# Patient Record
Sex: MALE | Race: WHITE | NOT HISPANIC OR LATINO | Employment: OTHER | URBAN - NONMETROPOLITAN AREA
[De-identification: names, ages, dates, MRNs, and addresses within clinical notes are randomized per-mention and may not be internally consistent; named-entity substitution may affect disease eponyms.]

---

## 2017-07-27 ENCOUNTER — OFFICE VISIT (OUTPATIENT)
Dept: UROLOGY | Facility: CLINIC | Age: 82
End: 2017-07-27

## 2017-07-27 VITALS — BODY MASS INDEX: 40.92 KG/M2 | WEIGHT: 270 LBS | HEIGHT: 68 IN | TEMPERATURE: 96.7 F

## 2017-07-27 DIAGNOSIS — R35.1 NOCTURIA: Primary | ICD-10-CM

## 2017-07-27 LAB
BILIRUB BLD-MCNC: NEGATIVE MG/DL
CLARITY, POC: CLEAR
COLOR UR: YELLOW
GLUCOSE UR STRIP-MCNC: NEGATIVE MG/DL
KETONES UR QL: NEGATIVE
LEUKOCYTE EST, POC: NEGATIVE
NITRITE UR-MCNC: NEGATIVE MG/ML
PH UR: 5.5 [PH] (ref 5–8)
PROT UR STRIP-MCNC: NEGATIVE MG/DL
RBC # UR STRIP: NEGATIVE /UL
SP GR UR: 1.01 (ref 1–1.03)
UROBILINOGEN UR QL: NORMAL

## 2017-07-27 PROCEDURE — 99213 OFFICE O/P EST LOW 20 MIN: CPT | Performed by: UROLOGY

## 2017-07-27 PROCEDURE — 81003 URINALYSIS AUTO W/O SCOPE: CPT | Performed by: UROLOGY

## 2017-07-27 RX ORDER — DILTIAZEM HYDROCHLORIDE 180 MG/1
CAPSULE, COATED, EXTENDED RELEASE ORAL
Refills: 5 | COMMUNITY
Start: 2017-06-27

## 2017-07-27 RX ORDER — LOSARTAN POTASSIUM 50 MG/1
TABLET ORAL
Refills: 6 | COMMUNITY
Start: 2017-06-12

## 2017-07-27 RX ORDER — CETIRIZINE HYDROCHLORIDE 10 MG/1
TABLET ORAL
Refills: 5 | COMMUNITY
Start: 2017-06-26

## 2017-07-27 RX ORDER — ALBUTEROL SULFATE 2.5 MG/3ML
SOLUTION RESPIRATORY (INHALATION)
Refills: 1 | COMMUNITY
Start: 2017-06-06

## 2017-07-27 RX ORDER — CLOPIDOGREL BISULFATE 75 MG/1
TABLET ORAL
Refills: 5 | COMMUNITY
Start: 2017-06-12

## 2017-07-27 RX ORDER — ESCITALOPRAM OXALATE 10 MG/1
TABLET ORAL
Refills: 5 | COMMUNITY
Start: 2017-06-12

## 2017-07-27 RX ORDER — TAMSULOSIN HYDROCHLORIDE 0.4 MG/1
1 CAPSULE ORAL NIGHTLY
Qty: 30 CAPSULE | Refills: 11 | Status: SHIPPED | OUTPATIENT
Start: 2017-07-27 | End: 2017-08-26

## 2017-07-27 RX ORDER — FAMOTIDINE 20 MG/1
TABLET, FILM COATED ORAL
Refills: 6 | COMMUNITY
Start: 2017-06-12

## 2017-07-27 RX ORDER — PRAVASTATIN SODIUM 20 MG
TABLET ORAL
Refills: 5 | COMMUNITY
Start: 2017-06-12

## 2017-07-27 RX ORDER — ERYTHROMYCIN 5 MG/G
OINTMENT OPHTHALMIC
Refills: 5 | COMMUNITY
Start: 2017-07-07

## 2019-02-11 ENCOUNTER — HOSPITAL ENCOUNTER (OUTPATIENT)
Age: 84
Setting detail: OBSERVATION
Discharge: HOME HEALTH | End: 2019-02-14
Attending: EMERGENCY MEDICINE | Admitting: FAMILY MEDICINE
Payer: MEDICARE

## 2019-02-11 DIAGNOSIS — R11.2 NAUSEA VOMITING AND DIARRHEA: Primary | ICD-10-CM

## 2019-02-11 DIAGNOSIS — R19.7 NAUSEA VOMITING AND DIARRHEA: Primary | ICD-10-CM

## 2019-02-11 LAB
ALBUMIN SERPL-MCNC: 4.3 G/DL (ref 3.5–5.2)
ALP BLD-CCNC: 128 U/L (ref 40–130)
ALT SERPL-CCNC: 13 U/L (ref 5–41)
ANION GAP SERPL CALCULATED.3IONS-SCNC: 14 MMOL/L (ref 7–19)
AST SERPL-CCNC: 20 U/L (ref 5–40)
BASOPHILS ABSOLUTE: 0 K/UL (ref 0–0.2)
BASOPHILS RELATIVE PERCENT: 0.2 % (ref 0–1)
BILIRUB SERPL-MCNC: 1 MG/DL (ref 0.2–1.2)
BILIRUBIN URINE: NEGATIVE
BLOOD, URINE: NEGATIVE
BUN BLDV-MCNC: 25 MG/DL (ref 8–23)
CALCIUM SERPL-MCNC: 9.2 MG/DL (ref 8.2–9.6)
CHLORIDE BLD-SCNC: 103 MMOL/L (ref 98–111)
CLARITY: ABNORMAL
CO2: 19 MMOL/L (ref 22–29)
COLOR: ABNORMAL
CREAT SERPL-MCNC: 1.3 MG/DL (ref 0.5–1.2)
EOSINOPHILS ABSOLUTE: 0 K/UL (ref 0–0.6)
EOSINOPHILS RELATIVE PERCENT: 0 % (ref 0–5)
GFR NON-AFRICAN AMERICAN: 51
GLUCOSE BLD-MCNC: 143 MG/DL (ref 74–109)
GLUCOSE URINE: NEGATIVE MG/DL
HCT VFR BLD CALC: 45.9 % (ref 42–52)
HEMOGLOBIN: 15.3 G/DL (ref 14–18)
KETONES, URINE: NEGATIVE MG/DL
LEUKOCYTE ESTERASE, URINE: NEGATIVE
LIPASE: 34 U/L (ref 13–60)
LYMPHOCYTES ABSOLUTE: 0.5 K/UL (ref 1.1–4.5)
LYMPHOCYTES RELATIVE PERCENT: 3.7 % (ref 20–40)
MCH RBC QN AUTO: 31.5 PG (ref 27–31)
MCHC RBC AUTO-ENTMCNC: 33.3 G/DL (ref 33–37)
MCV RBC AUTO: 94.4 FL (ref 80–94)
MONOCYTES ABSOLUTE: 0.5 K/UL (ref 0–0.9)
MONOCYTES RELATIVE PERCENT: 4.5 % (ref 0–10)
NEUTROPHILS ABSOLUTE: 11 K/UL (ref 1.5–7.5)
NEUTROPHILS RELATIVE PERCENT: 91.2 % (ref 50–65)
NITRITE, URINE: NEGATIVE
PDW BLD-RTO: 14.6 % (ref 11.5–14.5)
PH UA: 5.5
PLATELET # BLD: 192 K/UL (ref 130–400)
PMV BLD AUTO: 9.9 FL (ref 9.4–12.4)
POTASSIUM SERPL-SCNC: 5.5 MMOL/L (ref 3.5–5)
PROTEIN UA: ABNORMAL MG/DL
RBC # BLD: 4.86 M/UL (ref 4.7–6.1)
SODIUM BLD-SCNC: 136 MMOL/L (ref 136–145)
SPECIFIC GRAVITY UA: 1.02
TOTAL PROTEIN: 6.5 G/DL (ref 6.6–8.7)
URINE REFLEX TO CULTURE: ABNORMAL
UROBILINOGEN, URINE: 0.2 E.U./DL
WBC # BLD: 12.1 K/UL (ref 4.8–10.8)

## 2019-02-11 PROCEDURE — 99285 EMERGENCY DEPT VISIT HI MDM: CPT

## 2019-02-11 PROCEDURE — 96374 THER/PROPH/DIAG INJ IV PUSH: CPT

## 2019-02-11 PROCEDURE — 80053 COMPREHEN METABOLIC PANEL: CPT

## 2019-02-11 PROCEDURE — 2580000003 HC RX 258: Performed by: EMERGENCY MEDICINE

## 2019-02-11 PROCEDURE — 93005 ELECTROCARDIOGRAM TRACING: CPT

## 2019-02-11 PROCEDURE — 36415 COLL VENOUS BLD VENIPUNCTURE: CPT

## 2019-02-11 PROCEDURE — 83690 ASSAY OF LIPASE: CPT

## 2019-02-11 PROCEDURE — 85025 COMPLETE CBC W/AUTO DIFF WBC: CPT

## 2019-02-11 PROCEDURE — 6360000002 HC RX W HCPCS

## 2019-02-11 RX ORDER — ONDANSETRON 2 MG/ML
INJECTION INTRAMUSCULAR; INTRAVENOUS
Status: COMPLETED
Start: 2019-02-11 | End: 2019-02-11

## 2019-02-11 RX ORDER — 0.9 % SODIUM CHLORIDE 0.9 %
1000 INTRAVENOUS SOLUTION INTRAVENOUS ONCE
Status: COMPLETED | OUTPATIENT
Start: 2019-02-11 | End: 2019-02-11

## 2019-02-11 RX ADMIN — SODIUM CHLORIDE 1000 ML: 9 INJECTION, SOLUTION INTRAVENOUS at 20:09

## 2019-02-11 RX ADMIN — ONDANSETRON 4 MG: 2 INJECTION INTRAMUSCULAR; INTRAVENOUS at 23:29

## 2019-02-11 ASSESSMENT — ENCOUNTER SYMPTOMS
NAUSEA: 1
EYE REDNESS: 0
COLOR CHANGE: 0
CHEST TIGHTNESS: 0
CONSTIPATION: 0
SORE THROAT: 0
DIARRHEA: 1
WHEEZING: 0
BACK PAIN: 0
ABDOMINAL PAIN: 0
VOMITING: 1
SINUS PRESSURE: 0
ABDOMINAL DISTENTION: 0
PHOTOPHOBIA: 0
COUGH: 0
TROUBLE SWALLOWING: 0
SHORTNESS OF BREATH: 0
EYE PAIN: 0

## 2019-02-12 PROBLEM — E86.0 DEHYDRATION: Status: ACTIVE | Noted: 2019-02-12

## 2019-02-12 PROBLEM — N17.9 AKI (ACUTE KIDNEY INJURY) (HCC): Status: ACTIVE | Noted: 2019-02-12

## 2019-02-12 PROBLEM — E87.5 HYPERKALEMIA: Status: ACTIVE | Noted: 2019-02-12

## 2019-02-12 PROBLEM — K52.9 AGE (ACUTE GASTROENTERITIS): Status: ACTIVE | Noted: 2019-02-12

## 2019-02-12 PROBLEM — R11.2 INTRACTABLE VOMITING WITH NAUSEA: Status: ACTIVE | Noted: 2019-02-12

## 2019-02-12 PROBLEM — I10 ESSENTIAL HYPERTENSION: Status: ACTIVE | Noted: 2019-02-12

## 2019-02-12 PROBLEM — K21.9 GERD (GASTROESOPHAGEAL REFLUX DISEASE): Status: ACTIVE | Noted: 2019-02-12

## 2019-02-12 PROCEDURE — 2580000003 HC RX 258: Performed by: EMERGENCY MEDICINE

## 2019-02-12 PROCEDURE — 96372 THER/PROPH/DIAG INJ SC/IM: CPT

## 2019-02-12 PROCEDURE — G0378 HOSPITAL OBSERVATION PER HR: HCPCS

## 2019-02-12 PROCEDURE — 6360000002 HC RX W HCPCS: Performed by: FAMILY MEDICINE

## 2019-02-12 PROCEDURE — 81003 URINALYSIS AUTO W/O SCOPE: CPT

## 2019-02-12 PROCEDURE — 99285 EMERGENCY DEPT VISIT HI MDM: CPT | Performed by: EMERGENCY MEDICINE

## 2019-02-12 PROCEDURE — 2580000003 HC RX 258: Performed by: FAMILY MEDICINE

## 2019-02-12 PROCEDURE — 6370000000 HC RX 637 (ALT 250 FOR IP): Performed by: FAMILY MEDICINE

## 2019-02-12 RX ORDER — ESCITALOPRAM OXALATE 10 MG/1
10 TABLET ORAL NIGHTLY
COMMUNITY
End: 2020-07-20

## 2019-02-12 RX ORDER — ONDANSETRON 2 MG/ML
4 INJECTION INTRAMUSCULAR; INTRAVENOUS EVERY 8 HOURS PRN
Status: DISCONTINUED | OUTPATIENT
Start: 2019-02-12 | End: 2019-02-12

## 2019-02-12 RX ORDER — DILTIAZEM HYDROCHLORIDE 180 MG/1
180 CAPSULE, COATED, EXTENDED RELEASE ORAL DAILY
Status: DISCONTINUED | OUTPATIENT
Start: 2019-02-12 | End: 2019-02-14 | Stop reason: HOSPADM

## 2019-02-12 RX ORDER — CLOPIDOGREL BISULFATE 75 MG/1
75 TABLET ORAL DAILY
Status: DISCONTINUED | OUTPATIENT
Start: 2019-02-12 | End: 2019-02-14 | Stop reason: HOSPADM

## 2019-02-12 RX ORDER — CLOPIDOGREL BISULFATE 75 MG/1
75 TABLET ORAL DAILY
COMMUNITY

## 2019-02-12 RX ORDER — SODIUM CHLORIDE 9 MG/ML
INJECTION, SOLUTION INTRAVENOUS CONTINUOUS
Status: DISCONTINUED | OUTPATIENT
Start: 2019-02-12 | End: 2019-02-14 | Stop reason: HOSPADM

## 2019-02-12 RX ORDER — ESCITALOPRAM OXALATE 10 MG/1
10 TABLET ORAL NIGHTLY
Status: DISCONTINUED | OUTPATIENT
Start: 2019-02-12 | End: 2019-02-14 | Stop reason: HOSPADM

## 2019-02-12 RX ORDER — PRAVASTATIN SODIUM 10 MG
10 TABLET ORAL NIGHTLY
Status: DISCONTINUED | OUTPATIENT
Start: 2019-02-12 | End: 2019-02-14 | Stop reason: HOSPADM

## 2019-02-12 RX ORDER — ACETAMINOPHEN 325 MG/1
650 TABLET ORAL EVERY 4 HOURS PRN
Status: DISCONTINUED | OUTPATIENT
Start: 2019-02-12 | End: 2019-02-14 | Stop reason: HOSPADM

## 2019-02-12 RX ORDER — ONDANSETRON 2 MG/ML
4 INJECTION INTRAMUSCULAR; INTRAVENOUS EVERY 6 HOURS PRN
Status: DISCONTINUED | OUTPATIENT
Start: 2019-02-12 | End: 2019-02-14 | Stop reason: HOSPADM

## 2019-02-12 RX ORDER — SODIUM CHLORIDE 0.9 % (FLUSH) 0.9 %
10 SYRINGE (ML) INJECTION PRN
Status: DISCONTINUED | OUTPATIENT
Start: 2019-02-12 | End: 2019-02-12

## 2019-02-12 RX ORDER — ASPIRIN 325 MG
325 TABLET ORAL NIGHTLY
Status: DISCONTINUED | OUTPATIENT
Start: 2019-02-12 | End: 2019-02-14 | Stop reason: HOSPADM

## 2019-02-12 RX ORDER — 0.9 % SODIUM CHLORIDE 0.9 %
1000 INTRAVENOUS SOLUTION INTRAVENOUS ONCE
Status: COMPLETED | OUTPATIENT
Start: 2019-02-12 | End: 2019-02-12

## 2019-02-12 RX ORDER — ONDANSETRON 2 MG/ML
4 INJECTION INTRAMUSCULAR; INTRAVENOUS ONCE
Status: COMPLETED | OUTPATIENT
Start: 2019-02-12 | End: 2019-02-11

## 2019-02-12 RX ORDER — ASPIRIN 325 MG
325 TABLET ORAL NIGHTLY
Status: ON HOLD | COMMUNITY
End: 2019-02-14 | Stop reason: HOSPADM

## 2019-02-12 RX ORDER — ACETAMINOPHEN,DIPHENHYDRAMINE HCL 500; 25 MG/1; MG/1
1 TABLET, FILM COATED ORAL NIGHTLY PRN
Status: ON HOLD | COMMUNITY
End: 2020-03-11 | Stop reason: HOSPADM

## 2019-02-12 RX ORDER — SODIUM CHLORIDE 0.9 % (FLUSH) 0.9 %
10 SYRINGE (ML) INJECTION EVERY 12 HOURS SCHEDULED
Status: DISCONTINUED | OUTPATIENT
Start: 2019-02-12 | End: 2019-02-14

## 2019-02-12 RX ORDER — LANOLIN ALCOHOL/MO/W.PET/CERES
1000 CREAM (GRAM) TOPICAL DAILY
COMMUNITY

## 2019-02-12 RX ORDER — ACETAMINOPHEN 160 MG
2000 TABLET,DISINTEGRATING ORAL DAILY
COMMUNITY

## 2019-02-12 RX ORDER — SODIUM CHLORIDE 0.9 % (FLUSH) 0.9 %
10 SYRINGE (ML) INJECTION PRN
Status: DISCONTINUED | OUTPATIENT
Start: 2019-02-12 | End: 2019-02-14 | Stop reason: HOSPADM

## 2019-02-12 RX ORDER — LOSARTAN POTASSIUM 50 MG/1
50 TABLET ORAL DAILY
Status: DISCONTINUED | OUTPATIENT
Start: 2019-02-12 | End: 2019-02-14 | Stop reason: HOSPADM

## 2019-02-12 RX ORDER — LOSARTAN POTASSIUM 50 MG/1
50 TABLET ORAL DAILY
Status: ON HOLD | COMMUNITY
End: 2020-03-11 | Stop reason: HOSPADM

## 2019-02-12 RX ORDER — CETIRIZINE HYDROCHLORIDE 10 MG/1
10 TABLET ORAL DAILY
Status: ON HOLD | COMMUNITY
End: 2020-03-11 | Stop reason: HOSPADM

## 2019-02-12 RX ORDER — SODIUM CHLORIDE 0.9 % (FLUSH) 0.9 %
10 SYRINGE (ML) INJECTION EVERY 12 HOURS SCHEDULED
Status: DISCONTINUED | OUTPATIENT
Start: 2019-02-12 | End: 2019-02-12

## 2019-02-12 RX ADMIN — DILTIAZEM HYDROCHLORIDE 180 MG: 180 CAPSULE, COATED, EXTENDED RELEASE ORAL at 11:13

## 2019-02-12 RX ADMIN — CLOPIDOGREL BISULFATE 75 MG: 75 TABLET, FILM COATED ORAL at 11:12

## 2019-02-12 RX ADMIN — SODIUM CHLORIDE 1000 ML: 9 INJECTION, SOLUTION INTRAVENOUS at 00:40

## 2019-02-12 RX ADMIN — ESCITALOPRAM OXALATE 10 MG: 10 TABLET ORAL at 20:32

## 2019-02-12 RX ADMIN — PRAVASTATIN SODIUM 10 MG: 10 TABLET ORAL at 20:32

## 2019-02-12 RX ADMIN — SODIUM CHLORIDE: 9 INJECTION, SOLUTION INTRAVENOUS at 11:27

## 2019-02-12 RX ADMIN — Medication 10 ML: at 11:17

## 2019-02-12 RX ADMIN — LOSARTAN POTASSIUM 50 MG: 50 TABLET ORAL at 11:13

## 2019-02-12 RX ADMIN — ENOXAPARIN SODIUM 40 MG: 40 INJECTION SUBCUTANEOUS at 11:12

## 2019-02-12 RX ADMIN — ASPIRIN 325 MG ORAL TABLET 325 MG: 325 PILL ORAL at 20:32

## 2019-02-12 ASSESSMENT — PAIN SCALES - GENERAL: PAINLEVEL_OUTOF10: 0

## 2019-02-13 LAB
ANION GAP SERPL CALCULATED.3IONS-SCNC: 13 MMOL/L (ref 7–19)
BUN BLDV-MCNC: 21 MG/DL (ref 8–23)
CALCIUM SERPL-MCNC: 8.8 MG/DL (ref 8.2–9.6)
CHLORIDE BLD-SCNC: 102 MMOL/L (ref 98–111)
CO2: 22 MMOL/L (ref 22–29)
CREAT SERPL-MCNC: 1.2 MG/DL (ref 0.5–1.2)
GFR NON-AFRICAN AMERICAN: 56
GLUCOSE BLD-MCNC: 100 MG/DL (ref 74–109)
HCT VFR BLD CALC: 39.5 % (ref 42–52)
HEMOGLOBIN: 13 G/DL (ref 14–18)
MCH RBC QN AUTO: 31.6 PG (ref 27–31)
MCHC RBC AUTO-ENTMCNC: 32.9 G/DL (ref 33–37)
MCV RBC AUTO: 96.1 FL (ref 80–94)
PDW BLD-RTO: 14.9 % (ref 11.5–14.5)
PLATELET # BLD: 135 K/UL (ref 130–400)
PMV BLD AUTO: 9.6 FL (ref 9.4–12.4)
POTASSIUM SERPL-SCNC: 4.3 MMOL/L (ref 3.5–5)
RBC # BLD: 4.11 M/UL (ref 4.7–6.1)
SODIUM BLD-SCNC: 137 MMOL/L (ref 136–145)
WBC # BLD: 8.1 K/UL (ref 4.8–10.8)

## 2019-02-13 PROCEDURE — 6360000002 HC RX W HCPCS: Performed by: FAMILY MEDICINE

## 2019-02-13 PROCEDURE — 6370000000 HC RX 637 (ALT 250 FOR IP): Performed by: FAMILY MEDICINE

## 2019-02-13 PROCEDURE — 2580000003 HC RX 258: Performed by: FAMILY MEDICINE

## 2019-02-13 PROCEDURE — 96372 THER/PROPH/DIAG INJ SC/IM: CPT

## 2019-02-13 PROCEDURE — 36415 COLL VENOUS BLD VENIPUNCTURE: CPT

## 2019-02-13 PROCEDURE — 80048 BASIC METABOLIC PNL TOTAL CA: CPT

## 2019-02-13 PROCEDURE — 85027 COMPLETE CBC AUTOMATED: CPT

## 2019-02-13 PROCEDURE — G0378 HOSPITAL OBSERVATION PER HR: HCPCS

## 2019-02-13 RX ADMIN — Medication 10 ML: at 20:50

## 2019-02-13 RX ADMIN — DILTIAZEM HYDROCHLORIDE 180 MG: 180 CAPSULE, COATED, EXTENDED RELEASE ORAL at 10:43

## 2019-02-13 RX ADMIN — Medication 10 ML: at 10:44

## 2019-02-13 RX ADMIN — PRAVASTATIN SODIUM 10 MG: 10 TABLET ORAL at 20:49

## 2019-02-13 RX ADMIN — CLOPIDOGREL BISULFATE 75 MG: 75 TABLET, FILM COATED ORAL at 10:43

## 2019-02-13 RX ADMIN — ASPIRIN 325 MG ORAL TABLET 325 MG: 325 PILL ORAL at 20:50

## 2019-02-13 RX ADMIN — LOSARTAN POTASSIUM 50 MG: 50 TABLET ORAL at 10:42

## 2019-02-13 RX ADMIN — ESCITALOPRAM OXALATE 10 MG: 10 TABLET ORAL at 20:49

## 2019-02-13 RX ADMIN — ENOXAPARIN SODIUM 40 MG: 40 INJECTION SUBCUTANEOUS at 10:42

## 2019-02-14 VITALS
DIASTOLIC BLOOD PRESSURE: 73 MMHG | SYSTOLIC BLOOD PRESSURE: 155 MMHG | WEIGHT: 242.3 LBS | HEART RATE: 74 BPM | TEMPERATURE: 97.3 F | HEIGHT: 70 IN | BODY MASS INDEX: 34.69 KG/M2 | RESPIRATION RATE: 24 BRPM | OXYGEN SATURATION: 92 %

## 2019-02-14 PROCEDURE — G0378 HOSPITAL OBSERVATION PER HR: HCPCS

## 2019-02-14 PROCEDURE — 97530 THERAPEUTIC ACTIVITIES: CPT

## 2019-02-14 PROCEDURE — 97165 OT EVAL LOW COMPLEX 30 MIN: CPT

## 2019-02-14 PROCEDURE — 94761 N-INVAS EAR/PLS OXIMETRY MLT: CPT

## 2019-02-14 PROCEDURE — 6370000000 HC RX 637 (ALT 250 FOR IP): Performed by: FAMILY MEDICINE

## 2019-02-14 PROCEDURE — 97535 SELF CARE MNGMENT TRAINING: CPT

## 2019-02-14 RX ADMIN — LOSARTAN POTASSIUM 50 MG: 50 TABLET ORAL at 10:03

## 2019-02-14 RX ADMIN — CLOPIDOGREL BISULFATE 75 MG: 75 TABLET, FILM COATED ORAL at 10:03

## 2019-02-14 RX ADMIN — DILTIAZEM HYDROCHLORIDE 180 MG: 180 CAPSULE, COATED, EXTENDED RELEASE ORAL at 10:03

## 2019-02-14 RX ADMIN — ACETAMINOPHEN 650 MG: 325 TABLET ORAL at 10:03

## 2019-02-14 ASSESSMENT — PAIN SCALES - GENERAL
PAINLEVEL_OUTOF10: 1
PAINLEVEL_OUTOF10: 3

## 2019-02-15 LAB
EKG P AXIS: 31 DEGREES
EKG P-R INTERVAL: 164 MS
EKG Q-T INTERVAL: 360 MS
EKG QRS DURATION: 94 MS
EKG QTC CALCULATION (BAZETT): 416 MS
EKG T AXIS: 137 DEGREES

## 2019-03-14 PROBLEM — E86.0 DEHYDRATION: Status: RESOLVED | Noted: 2019-02-12 | Resolved: 2019-03-14

## 2020-02-21 ENCOUNTER — HOSPITAL ENCOUNTER (INPATIENT)
Age: 85
LOS: 5 days | Discharge: NURSING, REHAB OR LONG-TERM CARE | DRG: 193 | End: 2020-02-26
Attending: EMERGENCY MEDICINE | Admitting: FAMILY MEDICINE
Payer: MEDICARE

## 2020-02-21 ENCOUNTER — APPOINTMENT (OUTPATIENT)
Dept: CT IMAGING | Age: 85
DRG: 193 | End: 2020-02-21
Payer: MEDICARE

## 2020-02-21 ENCOUNTER — APPOINTMENT (OUTPATIENT)
Dept: GENERAL RADIOLOGY | Age: 85
DRG: 193 | End: 2020-02-21
Payer: MEDICARE

## 2020-02-21 PROBLEM — R57.1 HYPOVOLEMIC SHOCK (HCC): Status: ACTIVE | Noted: 2020-02-21

## 2020-02-21 LAB
ALBUMIN SERPL-MCNC: 3.3 G/DL (ref 3.5–5.2)
ALP BLD-CCNC: 135 U/L (ref 40–130)
ALT SERPL-CCNC: 22 U/L (ref 5–41)
ANION GAP SERPL CALCULATED.3IONS-SCNC: 16 MMOL/L (ref 7–19)
AST SERPL-CCNC: 31 U/L (ref 5–40)
BASE EXCESS ARTERIAL: -0.6 MMOL/L (ref -2–2)
BASOPHILS ABSOLUTE: 0 K/UL (ref 0–0.2)
BASOPHILS RELATIVE PERCENT: 0 % (ref 0–1)
BILIRUB SERPL-MCNC: 0.9 MG/DL (ref 0.2–1.2)
BILIRUBIN URINE: NEGATIVE
BLOOD, URINE: NEGATIVE
BUN BLDV-MCNC: 30 MG/DL (ref 8–23)
C-REACTIVE PROTEIN: 3.72 MG/DL (ref 0–0.5)
CALCIUM SERPL-MCNC: 8.3 MG/DL (ref 8.2–9.6)
CARBOXYHEMOGLOBIN ARTERIAL: 2.3 % (ref 0–5)
CHLORIDE BLD-SCNC: 102 MMOL/L (ref 98–111)
CLARITY: CLEAR
CO2: 21 MMOL/L (ref 22–29)
COLOR: YELLOW
CREAT SERPL-MCNC: 2.2 MG/DL (ref 0.5–1.2)
EOSINOPHILS ABSOLUTE: 0 K/UL (ref 0–0.6)
EOSINOPHILS RELATIVE PERCENT: 0 % (ref 0–5)
GFR NON-AFRICAN AMERICAN: 28
GLUCOSE BLD-MCNC: 129 MG/DL (ref 74–109)
GLUCOSE URINE: NEGATIVE MG/DL
HCO3 ARTERIAL: 23.1 MMOL/L (ref 22–26)
HCT VFR BLD CALC: 38.6 % (ref 42–52)
HEMOGLOBIN, ART, EXTENDED: 12.6 G/DL (ref 14–18)
HEMOGLOBIN: 12.4 G/DL (ref 14–18)
IMMATURE GRANULOCYTES #: 0 K/UL
KETONES, URINE: NEGATIVE MG/DL
LACTIC ACID: 1.2 MMOL/L (ref 0.5–1.9)
LACTIC ACID: 2.2 MMOL/L (ref 0.5–1.9)
LEUKOCYTE ESTERASE, URINE: NEGATIVE
LIPASE: 13 U/L (ref 13–60)
LYMPHOCYTES ABSOLUTE: 1.1 K/UL (ref 1.1–4.5)
LYMPHOCYTES RELATIVE PERCENT: 11.5 % (ref 20–40)
MAGNESIUM: 1.5 MG/DL (ref 1.7–2.3)
MAGNESIUM: 1.5 MG/DL (ref 1.7–2.3)
MCH RBC QN AUTO: 32 PG (ref 27–31)
MCHC RBC AUTO-ENTMCNC: 32.1 G/DL (ref 33–37)
MCV RBC AUTO: 99.7 FL (ref 80–94)
METHEMOGLOBIN ARTERIAL: 1.4 %
MONOCYTES ABSOLUTE: 0.4 K/UL (ref 0–0.9)
MONOCYTES RELATIVE PERCENT: 4.5 % (ref 0–10)
NEUTROPHILS ABSOLUTE: 7.7 K/UL (ref 1.5–7.5)
NEUTROPHILS RELATIVE PERCENT: 83.8 % (ref 50–65)
NITRITE, URINE: NEGATIVE
O2 CONTENT ARTERIAL: 15.6 ML/DL
O2 SAT, ARTERIAL: 88.2 %
O2 THERAPY: ABNORMAL
PCO2 ARTERIAL: 34 MMHG (ref 35–45)
PDW BLD-RTO: 14.2 % (ref 11.5–14.5)
PH ARTERIAL: 7.44 (ref 7.35–7.45)
PH UA: 5.5 (ref 5–8)
PLATELET # BLD: 193 K/UL (ref 130–400)
PMV BLD AUTO: 10 FL (ref 9.4–12.4)
PO2 ARTERIAL: 51 MMHG (ref 80–100)
POTASSIUM REFLEX MAGNESIUM: 3.4 MMOL/L (ref 3.5–5)
POTASSIUM, WHOLE BLOOD: 3.1
PRO-BNP: 1649 PG/ML (ref 0–1800)
PROTEIN UA: NEGATIVE MG/DL
RAPID INFLUENZA  B AGN: NEGATIVE
RAPID INFLUENZA A AGN: NEGATIVE
RBC # BLD: 3.87 M/UL (ref 4.7–6.1)
SEDIMENTATION RATE, ERYTHROCYTE: 18 MM/HR (ref 0–15)
SODIUM BLD-SCNC: 139 MMOL/L (ref 136–145)
SPECIFIC GRAVITY UA: 1.01 (ref 1–1.03)
TOTAL PROTEIN: 6.1 G/DL (ref 6.6–8.7)
TROPONIN: 0.04 NG/ML (ref 0–0.03)
TROPONIN: 0.05 NG/ML (ref 0–0.03)
TROPONIN: <0.01 NG/ML (ref 0–0.03)
URINE REFLEX TO CULTURE: NORMAL
UROBILINOGEN, URINE: 0.2 E.U./DL
WBC # BLD: 9.2 K/UL (ref 4.8–10.8)

## 2020-02-21 PROCEDURE — 87040 BLOOD CULTURE FOR BACTERIA: CPT

## 2020-02-21 PROCEDURE — 85652 RBC SED RATE AUTOMATED: CPT

## 2020-02-21 PROCEDURE — 2580000003 HC RX 258: Performed by: NURSE PRACTITIONER

## 2020-02-21 PROCEDURE — 99285 EMERGENCY DEPT VISIT HI MDM: CPT

## 2020-02-21 PROCEDURE — 83735 ASSAY OF MAGNESIUM: CPT

## 2020-02-21 PROCEDURE — 96365 THER/PROPH/DIAG IV INF INIT: CPT

## 2020-02-21 PROCEDURE — 74176 CT ABD & PELVIS W/O CONTRAST: CPT

## 2020-02-21 PROCEDURE — 83690 ASSAY OF LIPASE: CPT

## 2020-02-21 PROCEDURE — 83880 ASSAY OF NATRIURETIC PEPTIDE: CPT

## 2020-02-21 PROCEDURE — 87804 INFLUENZA ASSAY W/OPTIC: CPT

## 2020-02-21 PROCEDURE — 2000000000 HC ICU R&B

## 2020-02-21 PROCEDURE — 82803 BLOOD GASES ANY COMBINATION: CPT

## 2020-02-21 PROCEDURE — 6370000000 HC RX 637 (ALT 250 FOR IP): Performed by: NURSE PRACTITIONER

## 2020-02-21 PROCEDURE — 86140 C-REACTIVE PROTEIN: CPT

## 2020-02-21 PROCEDURE — 80053 COMPREHEN METABOLIC PANEL: CPT

## 2020-02-21 PROCEDURE — 83605 ASSAY OF LACTIC ACID: CPT

## 2020-02-21 PROCEDURE — 81003 URINALYSIS AUTO W/O SCOPE: CPT

## 2020-02-21 PROCEDURE — 36415 COLL VENOUS BLD VENIPUNCTURE: CPT

## 2020-02-21 PROCEDURE — 84132 ASSAY OF SERUM POTASSIUM: CPT

## 2020-02-21 PROCEDURE — 71045 X-RAY EXAM CHEST 1 VIEW: CPT

## 2020-02-21 PROCEDURE — 6370000000 HC RX 637 (ALT 250 FOR IP): Performed by: EMERGENCY MEDICINE

## 2020-02-21 PROCEDURE — 6360000002 HC RX W HCPCS: Performed by: NURSE PRACTITIONER

## 2020-02-21 PROCEDURE — 85025 COMPLETE CBC W/AUTO DIFF WBC: CPT

## 2020-02-21 PROCEDURE — 70450 CT HEAD/BRAIN W/O DYE: CPT

## 2020-02-21 PROCEDURE — 93005 ELECTROCARDIOGRAM TRACING: CPT | Performed by: NURSE PRACTITIONER

## 2020-02-21 PROCEDURE — 84484 ASSAY OF TROPONIN QUANT: CPT

## 2020-02-21 PROCEDURE — 94640 AIRWAY INHALATION TREATMENT: CPT

## 2020-02-21 PROCEDURE — 36600 WITHDRAWAL OF ARTERIAL BLOOD: CPT

## 2020-02-21 RX ORDER — ASPIRIN 81 MG/1
324 TABLET, CHEWABLE ORAL ONCE
Status: COMPLETED | OUTPATIENT
Start: 2020-02-21 | End: 2020-02-21

## 2020-02-21 RX ORDER — MAGNESIUM SULFATE IN WATER 40 MG/ML
2 INJECTION, SOLUTION INTRAVENOUS PRN
Status: DISCONTINUED | OUTPATIENT
Start: 2020-02-21 | End: 2020-02-26 | Stop reason: HOSPADM

## 2020-02-21 RX ORDER — 0.9 % SODIUM CHLORIDE 0.9 %
1000 INTRAVENOUS SOLUTION INTRAVENOUS ONCE
Status: COMPLETED | OUTPATIENT
Start: 2020-02-21 | End: 2020-02-21

## 2020-02-21 RX ORDER — POTASSIUM CHLORIDE 7.45 MG/ML
10 INJECTION INTRAVENOUS PRN
Status: DISCONTINUED | OUTPATIENT
Start: 2020-02-21 | End: 2020-02-26 | Stop reason: HOSPADM

## 2020-02-21 RX ORDER — POTASSIUM CHLORIDE 20 MEQ/1
40 TABLET, EXTENDED RELEASE ORAL PRN
Status: DISCONTINUED | OUTPATIENT
Start: 2020-02-21 | End: 2020-02-26 | Stop reason: HOSPADM

## 2020-02-21 RX ORDER — CEFEPIME HYDROCHLORIDE 2 G/50ML
1 INJECTION, SOLUTION INTRAVENOUS EVERY 12 HOURS
Status: DISCONTINUED | OUTPATIENT
Start: 2020-02-21 | End: 2020-02-21

## 2020-02-21 RX ORDER — SODIUM CHLORIDE 9 MG/ML
INJECTION, SOLUTION INTRAVENOUS CONTINUOUS
Status: DISCONTINUED | OUTPATIENT
Start: 2020-02-22 | End: 2020-02-25

## 2020-02-21 RX ORDER — PROMETHAZINE HYDROCHLORIDE 25 MG/1
12.5 TABLET ORAL EVERY 6 HOURS PRN
Status: DISCONTINUED | OUTPATIENT
Start: 2020-02-21 | End: 2020-02-26 | Stop reason: HOSPADM

## 2020-02-21 RX ORDER — 0.9 % SODIUM CHLORIDE 0.9 %
1000 INTRAVENOUS SOLUTION INTRAVENOUS ONCE
Status: COMPLETED | OUTPATIENT
Start: 2020-02-21 | End: 2020-02-22

## 2020-02-21 RX ORDER — ACETAMINOPHEN 325 MG/1
650 TABLET ORAL EVERY 6 HOURS PRN
Status: DISCONTINUED | OUTPATIENT
Start: 2020-02-21 | End: 2020-02-26 | Stop reason: HOSPADM

## 2020-02-21 RX ORDER — POTASSIUM CHLORIDE 20 MEQ/1
40 TABLET, EXTENDED RELEASE ORAL ONCE
Status: COMPLETED | OUTPATIENT
Start: 2020-02-21 | End: 2020-02-21

## 2020-02-21 RX ORDER — ACETAMINOPHEN 650 MG/1
650 SUPPOSITORY RECTAL EVERY 6 HOURS PRN
Status: DISCONTINUED | OUTPATIENT
Start: 2020-02-21 | End: 2020-02-26 | Stop reason: HOSPADM

## 2020-02-21 RX ORDER — SODIUM CHLORIDE 0.9 % (FLUSH) 0.9 %
10 SYRINGE (ML) INJECTION PRN
Status: DISCONTINUED | OUTPATIENT
Start: 2020-02-21 | End: 2020-02-26 | Stop reason: HOSPADM

## 2020-02-21 RX ORDER — SODIUM CHLORIDE 0.9 % (FLUSH) 0.9 %
10 SYRINGE (ML) INJECTION EVERY 12 HOURS SCHEDULED
Status: DISCONTINUED | OUTPATIENT
Start: 2020-02-22 | End: 2020-02-26 | Stop reason: HOSPADM

## 2020-02-21 RX ORDER — IPRATROPIUM BROMIDE AND ALBUTEROL SULFATE 2.5; .5 MG/3ML; MG/3ML
1 SOLUTION RESPIRATORY (INHALATION) ONCE
Status: COMPLETED | OUTPATIENT
Start: 2020-02-21 | End: 2020-02-21

## 2020-02-21 RX ORDER — ONDANSETRON 2 MG/ML
4 INJECTION INTRAMUSCULAR; INTRAVENOUS EVERY 6 HOURS PRN
Status: DISCONTINUED | OUTPATIENT
Start: 2020-02-21 | End: 2020-02-26 | Stop reason: HOSPADM

## 2020-02-21 RX ADMIN — SODIUM CHLORIDE 1000 ML: 9 INJECTION, SOLUTION INTRAVENOUS at 22:30

## 2020-02-21 RX ADMIN — SODIUM CHLORIDE 1000 ML: 9 INJECTION, SOLUTION INTRAVENOUS at 19:22

## 2020-02-21 RX ADMIN — IPRATROPIUM BROMIDE AND ALBUTEROL SULFATE 1 AMPULE: 2.5; .5 SOLUTION RESPIRATORY (INHALATION) at 19:23

## 2020-02-21 RX ADMIN — Medication 1 G: at 20:23

## 2020-02-21 RX ADMIN — POTASSIUM CHLORIDE 40 MEQ: 1500 TABLET, EXTENDED RELEASE ORAL at 20:23

## 2020-02-21 RX ADMIN — Medication 1000 MG: at 20:05

## 2020-02-21 RX ADMIN — ASPIRIN 81 MG 324 MG: 81 TABLET ORAL at 20:05

## 2020-02-21 ASSESSMENT — ENCOUNTER SYMPTOMS
BACK PAIN: 0
VOMITING: 1
PHOTOPHOBIA: 0
SHORTNESS OF BREATH: 0
WHEEZING: 0
SINUS PAIN: 0
SORE THROAT: 0
STRIDOR: 0
ABDOMINAL PAIN: 0
EYE REDNESS: 0
RECTAL PAIN: 0
NAUSEA: 1
APNEA: 0
CHEST TIGHTNESS: 0
COLOR CHANGE: 0
EYE DISCHARGE: 0
COUGH: 1
EYE PAIN: 0
CONSTIPATION: 0
BLOOD IN STOOL: 0
DIARRHEA: 1
ABDOMINAL DISTENTION: 0

## 2020-02-22 LAB
ANION GAP SERPL CALCULATED.3IONS-SCNC: 15 MMOL/L (ref 7–19)
APTT: 29 SEC (ref 26–36.2)
APTT: >200 SEC (ref 26–36.2)
BUN BLDV-MCNC: 31 MG/DL (ref 8–23)
C DIFF TOXIN/ANTIGEN: NORMAL
CALCIUM SERPL-MCNC: 8.1 MG/DL (ref 8.2–9.6)
CHLORIDE BLD-SCNC: 107 MMOL/L (ref 98–111)
CO2: 23 MMOL/L (ref 22–29)
CREAT SERPL-MCNC: 2.4 MG/DL (ref 0.5–1.2)
GFR NON-AFRICAN AMERICAN: 25
GLUCOSE BLD-MCNC: 94 MG/DL (ref 74–109)
HCT VFR BLD CALC: 33.4 % (ref 42–52)
HCT VFR BLD CALC: 34.6 % (ref 42–52)
HEMOGLOBIN: 10.7 G/DL (ref 14–18)
HEMOGLOBIN: 11 G/DL (ref 14–18)
MCH RBC QN AUTO: 31.8 PG (ref 27–31)
MCH RBC QN AUTO: 32.1 PG (ref 27–31)
MCHC RBC AUTO-ENTMCNC: 31.8 G/DL (ref 33–37)
MCHC RBC AUTO-ENTMCNC: 32 G/DL (ref 33–37)
MCV RBC AUTO: 100.9 FL (ref 80–94)
MCV RBC AUTO: 99.4 FL (ref 80–94)
PDW BLD-RTO: 14.4 % (ref 11.5–14.5)
PDW BLD-RTO: 14.4 % (ref 11.5–14.5)
PLATELET # BLD: 168 K/UL (ref 130–400)
PLATELET # BLD: 180 K/UL (ref 130–400)
PMV BLD AUTO: 10.3 FL (ref 9.4–12.4)
PMV BLD AUTO: 10.6 FL (ref 9.4–12.4)
POTASSIUM REFLEX MAGNESIUM: 3.7 MMOL/L (ref 3.5–5)
RBC # BLD: 3.36 M/UL (ref 4.7–6.1)
RBC # BLD: 3.43 M/UL (ref 4.7–6.1)
SODIUM BLD-SCNC: 145 MMOL/L (ref 136–145)
WBC # BLD: 12.4 K/UL (ref 4.8–10.8)
WBC # BLD: 13.9 K/UL (ref 4.8–10.8)

## 2020-02-22 PROCEDURE — 99222 1ST HOSP IP/OBS MODERATE 55: CPT | Performed by: INTERNAL MEDICINE

## 2020-02-22 PROCEDURE — 36415 COLL VENOUS BLD VENIPUNCTURE: CPT

## 2020-02-22 PROCEDURE — 6360000002 HC RX W HCPCS: Performed by: NURSE PRACTITIONER

## 2020-02-22 PROCEDURE — 2700000000 HC OXYGEN THERAPY PER DAY

## 2020-02-22 PROCEDURE — 2580000003 HC RX 258: Performed by: HOSPITALIST

## 2020-02-22 PROCEDURE — 87449 NOS EACH ORGANISM AG IA: CPT

## 2020-02-22 PROCEDURE — 6360000002 HC RX W HCPCS: Performed by: HOSPITALIST

## 2020-02-22 PROCEDURE — 87324 CLOSTRIDIUM AG IA: CPT

## 2020-02-22 PROCEDURE — 6370000000 HC RX 637 (ALT 250 FOR IP): Performed by: INTERNAL MEDICINE

## 2020-02-22 PROCEDURE — 2000000000 HC ICU R&B

## 2020-02-22 PROCEDURE — 87427 SHIGA-LIKE TOXIN AG IA: CPT

## 2020-02-22 PROCEDURE — 85730 THROMBOPLASTIN TIME PARTIAL: CPT

## 2020-02-22 PROCEDURE — 80048 BASIC METABOLIC PNL TOTAL CA: CPT

## 2020-02-22 PROCEDURE — 6360000002 HC RX W HCPCS: Performed by: INTERNAL MEDICINE

## 2020-02-22 PROCEDURE — 87015 SPECIMEN INFECT AGNT CONCNTJ: CPT

## 2020-02-22 PROCEDURE — 6370000000 HC RX 637 (ALT 250 FOR IP): Performed by: HOSPITALIST

## 2020-02-22 PROCEDURE — 2580000003 HC RX 258: Performed by: NURSE PRACTITIONER

## 2020-02-22 PROCEDURE — 87045 FECES CULTURE AEROBIC BACT: CPT

## 2020-02-22 PROCEDURE — 94640 AIRWAY INHALATION TREATMENT: CPT

## 2020-02-22 PROCEDURE — 85027 COMPLETE CBC AUTOMATED: CPT

## 2020-02-22 PROCEDURE — 87899 AGENT NOS ASSAY W/OPTIC: CPT

## 2020-02-22 PROCEDURE — 93970 EXTREMITY STUDY: CPT

## 2020-02-22 RX ORDER — PANTOPRAZOLE SODIUM 20 MG/1
20 TABLET, DELAYED RELEASE ORAL DAILY
COMMUNITY

## 2020-02-22 RX ORDER — IPRATROPIUM BROMIDE AND ALBUTEROL SULFATE 2.5; .5 MG/3ML; MG/3ML
1 SOLUTION RESPIRATORY (INHALATION)
Status: DISCONTINUED | OUTPATIENT
Start: 2020-02-22 | End: 2020-02-26 | Stop reason: HOSPADM

## 2020-02-22 RX ORDER — TAMSULOSIN HYDROCHLORIDE 0.4 MG/1
0.4 CAPSULE ORAL 2 TIMES DAILY
Status: DISCONTINUED | OUTPATIENT
Start: 2020-02-22 | End: 2020-02-26 | Stop reason: HOSPADM

## 2020-02-22 RX ORDER — HEPARIN SODIUM 1000 [USP'U]/ML
80 INJECTION, SOLUTION INTRAVENOUS; SUBCUTANEOUS ONCE
Status: COMPLETED | OUTPATIENT
Start: 2020-02-22 | End: 2020-02-22

## 2020-02-22 RX ORDER — CALCIUM CARBONATE 200(500)MG
1 TABLET,CHEWABLE ORAL DAILY PRN
COMMUNITY

## 2020-02-22 RX ORDER — HEPARIN SODIUM 1000 [USP'U]/ML
60 INJECTION, SOLUTION INTRAVENOUS; SUBCUTANEOUS ONCE
Status: DISCONTINUED | OUTPATIENT
Start: 2020-02-22 | End: 2020-02-22 | Stop reason: DRUGHIGH

## 2020-02-22 RX ORDER — FLUTICASONE PROPIONATE 50 MCG
2 SPRAY, SUSPENSION (ML) NASAL NIGHTLY PRN
COMMUNITY

## 2020-02-22 RX ORDER — HEPARIN SODIUM 1000 [USP'U]/ML
80 INJECTION, SOLUTION INTRAVENOUS; SUBCUTANEOUS PRN
Status: DISCONTINUED | OUTPATIENT
Start: 2020-02-22 | End: 2020-02-26 | Stop reason: HOSPADM

## 2020-02-22 RX ORDER — FINASTERIDE 5 MG/1
5 TABLET, FILM COATED ORAL DAILY
Status: DISCONTINUED | OUTPATIENT
Start: 2020-02-22 | End: 2020-02-26 | Stop reason: HOSPADM

## 2020-02-22 RX ORDER — METRONIDAZOLE 500 MG/1
500 TABLET ORAL EVERY 8 HOURS SCHEDULED
Status: DISCONTINUED | OUTPATIENT
Start: 2020-02-22 | End: 2020-02-26 | Stop reason: HOSPADM

## 2020-02-22 RX ORDER — HEPARIN SODIUM 10000 [USP'U]/100ML
12 INJECTION, SOLUTION INTRAVENOUS CONTINUOUS
Status: DISCONTINUED | OUTPATIENT
Start: 2020-02-22 | End: 2020-02-22 | Stop reason: DRUGHIGH

## 2020-02-22 RX ORDER — LORATADINE 10 MG/1
10 TABLET ORAL DAILY PRN
Status: ON HOLD | COMMUNITY
End: 2020-03-11 | Stop reason: HOSPADM

## 2020-02-22 RX ORDER — PHENOL 1.4 %
10 AEROSOL, SPRAY (ML) MUCOUS MEMBRANE NIGHTLY PRN
COMMUNITY

## 2020-02-22 RX ORDER — HEPARIN SODIUM 1000 [USP'U]/ML
40 INJECTION, SOLUTION INTRAVENOUS; SUBCUTANEOUS PRN
Status: DISCONTINUED | OUTPATIENT
Start: 2020-02-22 | End: 2020-02-26 | Stop reason: HOSPADM

## 2020-02-22 RX ORDER — HEPARIN SODIUM 1000 [USP'U]/ML
60 INJECTION, SOLUTION INTRAVENOUS; SUBCUTANEOUS PRN
Status: DISCONTINUED | OUTPATIENT
Start: 2020-02-22 | End: 2020-02-22 | Stop reason: DRUGHIGH

## 2020-02-22 RX ORDER — POLYETHYLENE GLYCOL 3350 17 G/17G
17 POWDER, FOR SOLUTION ORAL DAILY PRN
COMMUNITY

## 2020-02-22 RX ORDER — NITROGLYCERIN 0.4 MG/1
0.4 TABLET SUBLINGUAL EVERY 5 MIN PRN
Status: ON HOLD | COMMUNITY
End: 2020-03-11 | Stop reason: HOSPADM

## 2020-02-22 RX ORDER — HEPARIN SODIUM 10000 [USP'U]/100ML
18 INJECTION, SOLUTION INTRAVENOUS CONTINUOUS
Status: DISCONTINUED | OUTPATIENT
Start: 2020-02-22 | End: 2020-02-26 | Stop reason: HOSPADM

## 2020-02-22 RX ORDER — BUMETANIDE 1 MG/1
1 TABLET ORAL DAILY PRN
Status: ON HOLD | COMMUNITY
End: 2020-03-11 | Stop reason: HOSPADM

## 2020-02-22 RX ORDER — LOPERAMIDE HYDROCHLORIDE 2 MG/1
2 CAPSULE ORAL 4 TIMES DAILY PRN
COMMUNITY

## 2020-02-22 RX ORDER — HEPARIN SODIUM 1000 [USP'U]/ML
30 INJECTION, SOLUTION INTRAVENOUS; SUBCUTANEOUS PRN
Status: DISCONTINUED | OUTPATIENT
Start: 2020-02-22 | End: 2020-02-22 | Stop reason: DRUGHIGH

## 2020-02-22 RX ORDER — DIPHENOXYLATE HYDROCHLORIDE AND ATROPINE SULFATE 2.5; .025 MG/1; MG/1
1 TABLET ORAL 4 TIMES DAILY PRN
Status: ON HOLD | COMMUNITY
End: 2020-03-11 | Stop reason: HOSPADM

## 2020-02-22 RX ADMIN — METRONIDAZOLE 500 MG: 500 TABLET ORAL at 08:37

## 2020-02-22 RX ADMIN — SODIUM CHLORIDE: 9 INJECTION, SOLUTION INTRAVENOUS at 00:14

## 2020-02-22 RX ADMIN — ACETAMINOPHEN 650 MG: 325 TABLET ORAL at 20:59

## 2020-02-22 RX ADMIN — MEROPENEM 500 MG: 500 INJECTION, POWDER, FOR SOLUTION INTRAVENOUS at 00:13

## 2020-02-22 RX ADMIN — HEPARIN SODIUM 18 UNITS/KG/HR: 10000 INJECTION, SOLUTION INTRAVENOUS at 16:52

## 2020-02-22 RX ADMIN — IPRATROPIUM BROMIDE AND ALBUTEROL SULFATE 1 AMPULE: .5; 3 SOLUTION RESPIRATORY (INHALATION) at 18:17

## 2020-02-22 RX ADMIN — METRONIDAZOLE 500 MG: 500 TABLET ORAL at 15:00

## 2020-02-22 RX ADMIN — METRONIDAZOLE 500 MG: 500 TABLET ORAL at 22:19

## 2020-02-22 RX ADMIN — SODIUM CHLORIDE: 9 INJECTION, SOLUTION INTRAVENOUS at 16:52

## 2020-02-22 RX ADMIN — Medication 1 G: at 20:59

## 2020-02-22 RX ADMIN — TAMSULOSIN HYDROCHLORIDE 0.4 MG: 0.4 CAPSULE ORAL at 08:37

## 2020-02-22 RX ADMIN — FINASTERIDE 5 MG: 5 TABLET, FILM COATED ORAL at 08:36

## 2020-02-22 RX ADMIN — HEPARIN SODIUM 8340 UNITS: 1000 INJECTION INTRAVENOUS; SUBCUTANEOUS at 16:52

## 2020-02-22 RX ADMIN — TAMSULOSIN HYDROCHLORIDE 0.4 MG: 0.4 CAPSULE ORAL at 20:59

## 2020-02-22 RX ADMIN — Medication 10 ML: at 20:59

## 2020-02-22 RX ADMIN — Medication 10 ML: at 08:39

## 2020-02-22 RX ADMIN — Medication 1 G: at 08:39

## 2020-02-22 ASSESSMENT — ENCOUNTER SYMPTOMS
VOMITING: 0
BACK PAIN: 0
SHORTNESS OF BREATH: 0
NAUSEA: 0
DIARRHEA: 1
CONSTIPATION: 0

## 2020-02-22 ASSESSMENT — PAIN SCALES - GENERAL
PAINLEVEL_OUTOF10: 0
PAINLEVEL_OUTOF10: 0

## 2020-02-22 NOTE — PROGRESS NOTES
Vascular Lab Prelim  Duplex venous scan of B/L LE shows +DVT in the LEFT LEG in the Femoral Vein in the thigh. No svt noted at this time. +Reflux in the RLE in the CFV and POP Vein. Final report pending.

## 2020-02-22 NOTE — CONSULTS
Provider, MD   carboxymethylcellulose 1 % ophthalmic solution 1 drop 2 times daily   Yes Historical Provider, MD   tiotropium (SPIRIVA RESPIMAT) 1.25 MCG/ACT AERS inhaler Inhale 1 puff into the lungs daily   Yes Historical Provider, MD   clopidogrel (PLAVIX) 75 MG tablet Take 75 mg by mouth daily   Yes Historical Provider, MD   vitamin B-12 (CYANOCOBALAMIN) 1000 MCG tablet Take 1,000 mcg by mouth daily   Yes Historical Provider, MD   Cholecalciferol (VITAMIN D3) 2000 units CAPS Take 2,000 Units by mouth daily   Yes Historical Provider, MD   cetirizine (ZYRTEC) 10 MG tablet Take 10 mg by mouth daily   Yes Historical Provider, MD   losartan (COZAAR) 50 MG tablet Take 50 mg by mouth daily   Yes Historical Provider, MD   Magnesium Chloride-Calcium  MG Take 1 tablet by mouth nightly   Yes Historical Provider, MD   escitalopram (LEXAPRO) 10 MG tablet Take 10 mg by mouth nightly   Yes Historical Provider, MD   diphenhydrAMINE-APAP, sleep, (TYLENOL PM EXTRA STRENGTH)  MG tablet Take 1 tablet by mouth nightly as needed for Sleep   Yes Historical Provider, MD   pravastatin (PRAVACHOL) 20 MG tablet 20 mg nightly  10/14/11  Yes Historical Provider, MD   diltiazem (CARDIZEM CD) 180 MG ER capsule 180 mg daily  8/24/11  Yes Historical Provider, MD   Ascorbic Acid (VITAMIN C) 500 MG tablet Take 500 mg by mouth 2 times daily    Yes Historical Provider, MD   aspirin 81 MG chewable tablet Take 81 mg by mouth daily. Yes Historical Provider, MD   bumetanide (BUMEX) 1 MG tablet Take 1 mg by mouth daily as needed As needed for edema and related SOA    Historical Provider, MD   fluticasone (FLONASE) 50 MCG/ACT nasal spray 2 sprays by Each Nostril route nightly as needed for Rhinitis    Historical Provider, MD   diphenoxylate-atropine (LOMOTIL) 2.5-0.025 MG per tablet Take 1 tablet by mouth 4 times daily as needed for Diarrhea.     Historical Provider, MD   loperamide (IMODIUM) 2 MG capsule Take 2 mg by mouth 4 times daily as Lifestyle    Physical activity:     Days per week: Not on file     Minutes per session: Not on file    Stress: Not on file   Relationships    Social connections:     Talks on phone: Not on file     Gets together: Not on file     Attends Restorationism service: Not on file     Active member of club or organization: Not on file     Attends meetings of clubs or organizations: Not on file     Relationship status: Not on file    Intimate partner violence:     Fear of current or ex partner: Not on file     Emotionally abused: Not on file     Physically abused: Not on file     Forced sexual activity: Not on file   Other Topics Concern    Not on file   Social History Narrative    Not on file       Family History:   Family History   Problem Relation Age of Onset    Diabetes Mother     Heart Disease Mother     Heart Attack Mother     Other Mother         cerebral hemorrhage    Diabetes Father     Other Other         all 5 siblings have diabetes    Other Sister         rheumatic fever       No GI malignancies     ROS:  GENERAL: No fever or chills. NEURO: No headache or seizure. EYES: No diplopia or vision loss. CARDIOVASCULAR: No chest pain or palpitations. RESPIRATORY: No dyspnea or cough. GI: - melena. - hematochezia, - abdominal pain, + nausea/vomiting, - dysphagia, + diarrhea  : No dysuria or hematuria. GYN: No discharge or abnormal bleeding. MUSCULOSKELETAL: No new arthralgias or myalgias  DERM: No rash or jaundice. ENDOCRINE: No polyuria or polydipsia. PSYCH: No anxiety or depression. Physical Exam:  VITALS:   Vitals:    02/22/20 0620 02/22/20 0700 02/22/20 0800 02/22/20 0900   BP:  (!) 111/43 (!) 108/41 (!) 109/43   Pulse:  73 77 76   Resp: 26 28 22 25   Temp:   99 °F (37.2 °C)    TempSrc:   Temporal    SpO2: 94% 96% 94% 90%   Weight:       Height:           General appearance: alert and cooperative with exam, appears stated age, no acute distress   Head: normal cephalic, atraumatic. EOMI bilaterally, no neck lymphadenopathy appreciated, no carotid bruits noted  Lungs: clear to auscultation bilaterally and diminished breath sounds bilaterally  Heart: regular rate and rhythm, S1, S2 normal, no murmur, click, rub or gallop  Abdomen: soft, non-tender; bowel sounds normal; no masses,  no organomegaly  Skin: warm, dry, no obvious rash, non-jaundice  Extremities: mild trace edema. Neurologic: No obvious focal neurologic deficits. CN II-XII grossly intact      Labs:     Recent Labs     02/21/20 1900 02/22/20  0440   WBC 9.2 13.9*   RBC 3.87* 3.43*   HGB 12.4* 11.0*   HCT 38.6* 34.6*   MCV 99.7* 100.9*   MCH 32.0* 32.1*   MCHC 32.1* 31.8*    180     Recent Labs     02/21/20 1900 02/21/20 1920 02/22/20  0440     --  145   K 3.4* 3.1 3.7   ANIONGAP 16  --  15     --  107   CO2 21*  --  23   BUN 30*  --  31*   CREATININE 2.2*  --  2.4*   GLUCOSE 129*  --  94   CALCIUM 8.3  --  8.1*     Recent Labs     02/21/20 1900   MG 1.5*  1.5*     Recent Labs     02/21/20 1900   AST 31   ALT 22   BILITOT 0.9   ALKPHOS 135*     HgBA1c:  No components found for: HGBA1C  FLP:    Lab Results   Component Value Date    TRIG 107 02/25/2015    HDL 34 02/25/2015    LDLDIRECT 63 02/25/2015     TSH:    Lab Results   Component Value Date    TSH 5.80 12/31/2014     Troponin T:   Recent Labs     02/21/20 1900 02/21/20 2108   TROPONINI 0.05*  0.04* <0.01     INR: No results for input(s): INR in the last 72 hours. Recent Labs     02/21/20  1900   LIPASE 13       Radiology:  CT Abd/pel done upon admission, reviewed. Assessment:  1.) Enteritis    Plan:  1.) IVF  2.) ABx as patient is currently on  3.) Await stool cultures obtained this AM  4.) No need for any GI endoscopic intervention at this time, as it is not warranted in this clinical presentation. Thank you for the consultation and allowing me to participate in this patient's care alongside with you!

## 2020-02-22 NOTE — FLOWSHEET NOTE
02/22/20 0930   Encounter Summary   Services provided to: Patient and family together  (son at bedside in ICU)   Referral/Consult From: Nurse   Support System Children   Complexity of Encounter Moderate   Length of Encounter 1 hour   Advance Care Planning Yes   Spiritual/Hindu   Type Spiritual support   Assessment Approachable; Anxious; Hopeful;Coping   Intervention Active listening;Explored feelings, thoughts, concerns;Prayer;Sustaining presence/ Ministry of presence   Outcome Connection/belonging;Expressed gratitude;Expressed feelings/needs/concerns;Coping   Advance Directives (For Healthcare)   Healthcare Directive Yes, patient has an advance directive for healthcare treatment   Type of Healthcare Directive Living will   Healthcare Agent Appointed Adult [facility] [address] Agent's Name [id number] [facility] Agent's Phone Number [phone]   If you are unable to speak for yourself, does your Healthcare Agent or Legal Spokesperson know your healthcare wishes? Yes     The son said that pt has LW in the chat.   Electronically signed by Kailey Avila on 2/22/2020 at 5:14 PM

## 2020-02-22 NOTE — ED NOTES
Bed: 17  Expected date: 2/21/20  Expected time:   Means of arrival: [facility]  Comments:  [facility]     [facility]  02/21/20 8284

## 2020-02-22 NOTE — H&P
Provider, MD   pravastatin (PRAVACHOL) 20 MG tablet 20 mg nightly  10/14/11  Yes Historical Provider, MD   diltiazem (CARDIZEM CD) 180 MG ER capsule 180 mg daily  8/24/11  Yes Historical Provider, MD   Ascorbic Acid (VITAMIN C) 500 MG tablet Take 500 mg by mouth 2 times daily    Yes Historical Provider, MD   aspirin 81 MG chewable tablet Take 81 mg by mouth daily. Yes Historical Provider, MD       Allergies:    Ketek [telithromycin]    Social History:      Tobacco:   reports that he has never smoked. He has never used smokeless tobacco.  Alcohol:   reports no history of alcohol use. Illicit Drugs: no     Family History:      Problem Relation Age of Onset    Diabetes Mother     Heart Disease Mother     Heart Attack Mother     Other Mother         cerebral hemorrhage    Diabetes Father     Other Other         all 5 siblings have diabetes    Other Sister         rheumatic fever       Review of Systems:   Unable to provide confused     Physical Examination:       BP (!) 88/54   Pulse 74   Temp 98.9 °F (37.2 °C) (Oral)   Resp 12   Ht 5' 10\" (1.778 m)   Wt 228 lb 9.6 oz (103.7 kg)   SpO2 91%   BMI 32.80 kg/m²   General appearance: some apparent distress, appears stated age and cooperative. Well developed and well groomed. HEENT: Normal cephalic, atraumatic without obvious deformity. Pupils equal, round, and reactive to light. Extra ocular muscles intact. Conjunctivae/corneas clear. Normal ears and nose. Neck: Supple, with full range of motion. No jugular venous distention. Trachea midline. Thyroid no masses noted. Respiratory: scattered wheezing   Cardiovascular: Regular rate and rhythm with normal S1/S2 without murmurs, rubs or gallops. Abdomen: Soft, generally tender, distended with normal bowel sounds. .  Musculoskeletal: edema swelling in left LE   Neurologic:  grossly non-focal.      Diagnostic Data:  Imaging:   CT ABDOMEN PELVIS WO CONTRAST Additional Contrast? Oral   Final Result   1. Retention of fluid within the GI tract probably related to   enteritis, without evidence of bowel obstruction or significant bowel   wall thickening. No free air is identified. 2. Moderate bilateral hydronephrosis and hydroureter, with marked   distention of the bladder, likely related to chronic urinary   retention. Follow-up ultrasound of kidneys is recommended to evaluate   multiple exophytic lesions, some which appear to represent simple   cysts, others probably represent proteinaceous cysts, versus small   solid nodules. 3. Fusiform infrarenal abdominal aortic aneurysm with a maximum   diameter of 4.3 cm, there is also aneurysmal dilation of the iliac   arteries. 4. Infiltrates present in the lower lobes of both lungs, greater on   the right, suspicious for acute pneumonia. 5. Mild anasarca and evidence of generalized volume overload. 6. Evidence of previous cholecystectomy. 7. Fatty infiltration of the liver. Signed by Dr Yvon Trujillo on 2/21/2020 10:26 PM      CT Head WO Contrast   Final Result   Impression: No acute intracranial abnormality. There are chronic   findings associated with aging. Signed by Dr Yvon Trujillo on 2/21/2020 7:43 PM      XR CHEST PORTABLE   Final Result   New bibasilar infiltrates, greater at the medial right   base, suspicious for pneumonia. Signed by Dr Yvon Trujillo on 2/21/2020 7:36 PM        CBC:  Recent Labs     02/21/20 1900   WBC 9.2   HGB 12.4*   HCT 38.6*        BMP:  Recent Labs     02/21/20 1900 02/21/20 1920     --    K 3.4* 3.1     --    CO2 21*  --    BUN 30*  --    CREATININE 2.2*  --    CALCIUM 8.3  --      Recent Labs     02/21/20 1900   AST 31   ALT 22   BILITOT 0.9   ALKPHOS 135*     Coag Panel: No results for input(s): INR, PROTIME, APTT in the last 72 hours.   Cardiac Enzymes:   Recent Labs     02/21/20 1900 02/21/20 2108   TROPONINI 0.05*  0.04* <0.01     ABGs:  Lab Results   Component Value Date    PHART

## 2020-02-22 NOTE — PROGRESS NOTES
Hospitalist Progress Note    Patient:  Lizzeth Yousif  YOB: 1925  Date of Service: 2/22/2020  MRN: [id number]   Acct: [de-identified]   Primary Care Physician: Antonella Yeh MD  Advance Directive: [facility]  Admit Date: 2/21/2020       Hospital Day: 1  Referring Provider: Yenifer Hernandez DO    Patient Seen, Chart, Consults, Notes, Labs, Radiology studies reviewed. Subjective:  Lizzeth Yousif is a 80 y.o. male  whom we are following for hypovolemic shock, bladder outlet obstruction, acute kidney injury, enteritis. He is feeling better this morning. Abdomen is less distended and denies any pain. He had been having diarrhea prior to coming into the hospital.  He is asking for some water. He denies ever having history of bladder outlet obstruction or prostate problems in the past.  He tells me this is the first time he is ever had a Robin catheter. I suspect that they may have a component of some chronic obstruction. In looking at the I&O balance, it was documented 1.3 L of urine output and I am assuming that was when the Robin was initially placed. Typically with bladder outlet obstructions, you would see rapid improvement in GFR with Robin placement. He needs more time.     Allergies:  Ketek [telithromycin]    Medicines:  Current Facility-Administered Medications   Medication Dose Route Frequency Provider Last Rate Last Dose    metroNIDAZOLE (FLAGYL) tablet 500 mg  500 mg Oral 3 times per day Yenifer Hernandez DO        cefepime (MAXIPIME) 1 g in sodium chloride (PF) 10 mL IV syringe  1 g Intravenous Q12H RYLIE Villafuerte NP   1 g at 02/21/20 2023    sodium chloride flush 0.9 % injection 10 mL  10 mL Intravenous 2 times per day Erwin Sanders MD        sodium chloride flush 0.9 % injection 10 mL  10 mL Intravenous PRN Erwin Sanders MD        acetaminophen (TYLENOL) tablet 650 mg  650 mg Oral Q6H PRN Erwin Sanders MD        acetaminophen (TYLENOL) suppository 650 mg  650 mg Rectal Q6H PRN Baldemar Elaine MD        magnesium hydroxide (MILK OF MAGNESIA) 400 MG/5ML suspension 30 mL  30 mL Oral Daily PRN Baldemar Elaine MD        promethazine (PHENERGAN) tablet 12.5 mg  12.5 mg Oral Q6H PRN Baldemar Elaine MD        ondansetron [facility]) injection 4 mg  4 mg Intravenous Q6H PRN Baldemar Elaine MD        enoxaparin (LOVENOX) injection 30 mg  30 mg Subcutaneous Daily Baldemar Elaine MD        0.9 % sodium chloride infusion   Intravenous Continuous Baldemar Elaine  mL/hr at 02/22/20 0014      potassium chloride (KLOR-CON M) extended release tablet 40 mEq  40 mEq Oral PRN Baldemar Elaine MD        Or   Luan Glez potassium bicarb-citric acid (EFFER-K) effervescent tablet 40 mEq  40 mEq Oral PRN Baldemar Elaine MD        Or   Luan Glez potassium chloride 10 mEq/100 mL IVPB (Peripheral Line)  10 mEq Intravenous PRN Baldemar Elaine MD        potassium chloride 10 mEq/100 mL IVPB (Peripheral Line)  10 mEq Intravenous PRN Baldemar Elaine MD        magnesium sulfate 2 g in 50 mL IVPB premix  2 g Intravenous PRN Baldemar Elaine MD           Past Medical History:  Past Medical History:   Diagnosis Date    Cancer ([facility] [address]     prostate    GERD (gastroesophageal reflux disease)     Hyperlipidemia     Hypertension     TIA (transient ischemic attack)        Past Surgical History:  Past Surgical History:   Procedure Laterality Date    ANKLE FUSION      APPENDECTOMY      CHOLECYSTECTOMY      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    KNEE ARTHROPLASTY      bilateral       Family History  Family History   Problem Relation Age of Onset    Diabetes Mother     Heart Disease Mother     Heart Attack Mother     Other Mother         cerebral hemorrhage    Diabetes Father     Other Other         all 5 siblings have diabetes    Other Sister         rheumatic fever       Social History  Social History     Socioeconomic History    Marital status:      Spouse name: Not on file    Number of children: Not on file    Years of education: Not on file    Highest education level: Not on file   Occupational History    Not on file   Social Needs    Financial resource strain: Not on file    Food insecurity:     Worry: Not on file     Inability: Not on file    Transportation needs:     Medical: Not on file     Non-medical: Not on file   Tobacco Use    Smoking status: Never Smoker    Smokeless tobacco: Never Used   Substance and Sexual Activity    Alcohol use: No    Drug use: No    Sexual activity: Not on file   Lifestyle    Physical activity:     Days per week: Not on file     Minutes per session: Not on file    Stress: Not on file   Relationships    Social connections:     Talks on phone: Not on file     Gets together: Not on file     Attends Islam service: Not on file     Active member of club or organization: Not on file     Attends meetings of clubs or organizations: Not on file     Relationship status: Not on file    Intimate partner violence:     Fear of current or ex partner: Not on file     Emotionally abused: Not on file     Physically abused: Not on file     Forced sexual activity: Not on file   Other Topics Concern    Not on file   Social History Narrative    Not on file         Review of Systems:    Review of Systems   Constitutional: Negative for activity change and fever. Respiratory: Negative for shortness of breath. Cardiovascular: Negative for chest pain and leg swelling. Gastrointestinal: Positive for diarrhea. Negative for constipation, nausea and vomiting. Genitourinary: Negative for difficulty urinating and dysuria. Musculoskeletal: Negative for back pain and neck pain. Neurological: Negative for dizziness and light-headedness. Objective:  Blood pressure (!) 87/37, pulse 66, temperature 97 °F (36.1 °C), temperature source Temporal, resp. rate 26, height 5' 10\" (1.778 m), weight 230 lb (104.3 kg), SpO2 94 %.     Intake/Output Summary (Last 24 hours) at 2/22/2020 9839  Last data filed at 2/22/2020 0600  Gross per 24 hour   Intake 676.67 ml   Output 1715 ml   Net -1038.33 ml       Physical Exam  Vitals signs reviewed. Constitutional:       Appearance: He is well-developed. HENT:      Head: Normocephalic and atraumatic. Eyes:      Conjunctiva/sclera: Conjunctivae normal.      Pupils: Pupils are equal, round, and reactive to light. Cardiovascular:      Rate and Rhythm: Normal rate and regular rhythm. Heart sounds: Normal heart sounds. Pulmonary:      Effort: Pulmonary effort is normal.      Breath sounds: Normal breath sounds. Abdominal:      General: Abdomen is flat. Palpations: Abdomen is soft. Skin:     General: Skin is warm and dry. Neurological:      Mental Status: He is alert and oriented to person, place, and time. Labs:  BMP:   Recent Labs     02/21/20 1900 02/21/20 1920 02/22/20  0440     --  145   K 3.4* 3.1 3.7     --  107   CO2 21*  --  23   BUN 30*  --  31*   CREATININE 2.2*  --  2.4*   CALCIUM 8.3  --  8.1*     CBC:   Recent Labs     02/21/20 1900 02/22/20  0440   WBC 9.2 13.9*   HGB 12.4* 11.0*   HCT 38.6* 34.6*   MCV 99.7* 100.9*    180     LIVER PROFILE:   Recent Labs     02/21/20 1900   AST 31   ALT 22   LIPASE 13   BILITOT 0.9   ALKPHOS 135*     PT/INR: No results for input(s): PROTIME, INR in the last 72 hours. APTT: No results for input(s): APTT in the last 72 hours. BNP:  No results for input(s): BNP in the last 72 hours. Ionized Calcium:No results for input(s): IONCA in the last 72 hours. Magnesium:  Recent Labs     02/21/20 1900   MG 1.5*  1.5*     Phosphorus:No results for input(s): PHOS in the last 72 hours. HgbA1C: No results for input(s): LABA1C in the last 72 hours.   Hepatic:   Recent Labs     02/21/20 1900   ALKPHOS 135*   ALT 22   AST 31   PROT 6.1*   BILITOT 0.9   LABALBU 3.3*     Lactic Acid:   Recent Labs     02/21/20 2108   LACTA 1.2     Troponin: No results for input(s): CKTOTAL, CKMB, TROPONINT in the last 72 hours. ABGs: No results for input(s): PH, PCO2, PO2, HCO3, O2SAT in the last 72 hours. CRP:    Recent Labs     02/21/20 1900   CRP 3.72*     Sed Rate:    Recent Labs     02/21/20 1900   SEDRATE 18*       Cultures:   No results for input(s): CULTURE in the last 72 hours. No results for input(s): BC, Leonce Meals in the last 72 hours. No results for input(s): CXSURG in the last 72 hours. Radiology reports as per the Radiologist  Radiology: Ct Abdomen Pelvis Wo Contrast Additional Contrast? Oral    Result Date: 2/21/2020  EXAMINATION: CT ABDOMEN PELVIS WO CONTRAST 2/21/2020 10:17 PM HISTORY: Abdominal pain, nausea, vomiting and diarrhea. DOSE: 1580 mGycm. Automatic exposure control was utilized in an effort to use as little radiation as possible, without compromising image quality. REPORT: Spiral CT of the abdomen and pelvis was performed without contrast from the lung bases through the pubic symphysis. Reconstructed coronal and sagittal images are also reviewed. COMPARISON: There are no correlative imaging studies for comparison. Review of lung windows demonstrates moderate respiratory motion artifact and extensive infiltrate in the lower lobes greater on the right, compatible with pneumonia. No pneumothorax is identified. The lungs are hyperinflated compatible COPD. There is a vague subpleural nodule in the right middle lobe axial image 3, which measures approximately 4 mm. There is heavy calcified plaque in the coronary arteries, greatest at the LAD. There is ectasia of the ascending aorta. Heart size appears normal. There is trace pericardial fluid. This moderate streak artifact over the upper abdomen related to dense oral contrast in the stomach. Cholecystectomy clips are present. There is decreased attenuation of the liver diffusely compatible with fatty infiltration. The spleen is homogeneous and normal in size.  The pancreas and adrenal glands appear grossly normal. There is moderate bilateral hydronephrosis and dilation of the ureters which are tortuous and marked distention of the bladder is identified. There is mild circumferential bladder wall thickening. There are cysts within both kidneys and exophytic isoattenuating nodules and hyperattenuating nodules are seen at the kidneys. Some these may represent proteinaceous cysts, however small solid renal masses cannot be excluded and follow-up renal ultrasound is recommended. This includes a 13 mm nodule at the upper pole the right kidney, a exophytic nodule that measures 1.8 cm, at the mid right kidney, exophytic nodule at the upper pole the left kidney which is probably a cyst, measuring 1.4 cm, an exophytic nodule at the anterior cortex of the left upper pole measuring 1.2 cm. Bowel loops are normal caliber, there is mild fluid retention within the GI tract, without bowel wall thickening. Correlate clinically for enteritis. There is a fusiform infrarenal abdominal aorta, with a maximum transverse diameter 4.3 cm, aneurysmal dilation of the iliac arteries is also identified, the left common iliac artery has a maximum diameter 2.1 cm, the right common iliac artery has a maximum diameter 1.9 cm. The left internal iliac artery has a maximum diameter 2.5 cm. There is increased attenuation of body wall fat planes over the flank regions greater on the left, with overlying skin thickening. This probably represent represents volume overload and mild anasarca. Review of bone windows shows severe degenerative changes throughout the lower thoracic and entire lumbar spine with multiple vacuum discs, disc space collapse and endplate spurring. 1. Retention of fluid within the GI tract probably related to enteritis, without evidence of bowel obstruction or significant bowel wall thickening. No free air is identified.  2. Moderate bilateral hydronephrosis and hydroureter, with marked distention of the bladder, likely related to chronic infiltrative opacities in the medial lung bases greater on the right. Although atelectasis is likely present, pneumonia cannot be excluded. There is partial obscuration of the right heart margin. Heart size is normal. No pneumothorax or pleural effusion is identified. New bibasilar infiltrates, greater at the medial right base, suspicious for pneumonia. Signed by Dr Sean Castellano. Cassandra on 2/21/2020 7:36 PM       Assessment     Hypovolemic shock. Continue fluid resuscitation. Enteritis. Bowel rest.  Start oral Flagyl. C. difficile pending. GI consult    Bladder outlet obstruction. Continue Robin drainage. Start alpha-blocker and Proscar.       Hawa Hu, DO

## 2020-02-22 NOTE — ED PROVIDER NOTES
(COZAAR) 50 MG TABLET    Take 50 mg by mouth daily    MAGNESIUM CHLORIDE-CALCIUM  MG    Take 1 tablet by mouth nightly    PRAVASTATIN (PRAVACHOL) 20 MG TABLET    20 mg nightly     VITAMIN B-12 (CYANOCOBALAMIN) 1000 MCG TABLET    Take 1,000 mcg by mouth daily       ALLERGIES     Ketek [telithromycin]    FAMILY HISTORY       Family History   Problem Relation Age of Onset    Diabetes Mother     Heart Disease Mother     Heart Attack Mother     Other Mother         cerebral hemorrhage    Diabetes Father     Other Other         all 5 siblings have diabetes    Other Sister         rheumatic fever          SOCIAL HISTORY       Social History     Socioeconomic History    Marital status:      Spouse name: None    Number of children: None    Years of education: None    Highest education level: None   Occupational History    None   Social Needs    Financial resource strain: None    Food insecurity:     Worry: None     Inability: None    Transportation needs:     Medical: None     Non-medical: None   Tobacco Use    Smoking status: Never Smoker    Smokeless tobacco: Never Used   Substance and Sexual Activity    Alcohol use: No    Drug use: No    Sexual activity: None   Lifestyle    Physical activity:     Days per week: None     Minutes per session: None    Stress: None   Relationships    Social connections:     Talks on phone: None     Gets together: None     Attends [employer] service: None     Active member of club or organization: None     Attends meetings of clubs or organizations: None     Relationship status: None    Intimate partner violence:     Fear of current or ex partner: None     Emotionally abused: None     Physically abused: None     Forced sexual activity: None   Other Topics Concern    None   Social History Narrative    None       SCREENINGS    Jarrell Coma Scale  Eye Opening: Spontaneous  Best Verbal Response: Oriented  Best Motor Response: Obeys commands  Jarrell Coma Scale Score: 15      PHYSICAL EXAM    (up to 7 forlevel 4, 8 or more for level 5)     ED Triage Vitals [02/21/20 1847]   BP Temp Temp Source Pulse Resp SpO2 Height Weight   (!) 116/49 98.9 °F (37.2 °C) Oral 80 25 (!) 89 % 5' 10\" (1.778 m) 228 lb 9.6 oz (103.7 kg)       Physical Exam  Vitals signs and nursing note reviewed. Constitutional:       General: He is not in acute distress. Appearance: He is well-developed and normal weight. He is ill-appearing. He is not toxic-appearing or diaphoretic. HENT:      Head: Normocephalic and atraumatic. Nose: Nose normal.      Mouth/Throat:      Mouth: Mucous membranes are moist.   Eyes:      General: No scleral icterus. Right eye: No discharge. Left eye: No discharge. Conjunctiva/sclera: Conjunctivae normal.      Pupils: Pupils are equal, round, and reactive to light. Neck:      Musculoskeletal: Normal range of motion and neck supple. No neck rigidity or muscular tenderness. Vascular: No JVD. Trachea: No tracheal deviation. Cardiovascular:      Rate and Rhythm: Normal rate and regular rhythm. Pulses: Normal pulses. Heart sounds: Normal heart sounds. No murmur. No friction rub. No gallop. Pulmonary:      Effort: Pulmonary effort is normal. No accessory muscle usage or respiratory distress. Breath sounds: No stridor. Examination of the right-lower field reveals rhonchi. Rhonchi present. No wheezing or rales. Chest:      Chest wall: No tenderness. Abdominal:      General: Bowel sounds are normal. There is no distension. Palpations: Abdomen is soft. There is no mass. Tenderness: There is no abdominal tenderness. There is no guarding or rebound. Hernia: No hernia is present. Musculoskeletal: Normal range of motion. General: No tenderness or deformity. Skin:     General: Skin is warm and dry. Capillary Refill: Capillary refill takes less than 2 seconds. Findings: No erythema or rash. (*)     All other components within normal limits   TROPONIN - Abnormal; Notable for the following components:    Troponin 0.05 (*)     All other components within normal limits   MAGNESIUM - Abnormal; Notable for the following components:    Magnesium 1.5 (*)     All other components within normal limits   SEDIMENTATION RATE - Abnormal; Notable for the following components:    Sed Rate 18 (*)     All other components within normal limits   C-REACTIVE PROTEIN - Abnormal; Notable for the following components:    CRP 3.72 (*)     All other components within normal limits   RAPID INFLUENZA A/B ANTIGENS   CULTURE, BLOOD 1   CULTURE, BLOOD 2   CULTURE, STOOL   C DIFF TOXIN/ANTIGEN   LIPASE   BRAIN NATRIURETIC PEPTIDE   URINE RT REFLEX TO CULTURE   POTASSIUM, WHOLE BLOOD   LACTIC ACID, PLASMA   TROPONIN       All other labs were within normal range or notreturned as of this dictation. RE-ASSESSMENT          EMERGENCY DEPARTMENT COURSE and DIFFERENTIAL DIAGNOSIS/MDM:   Vitals:    Vitals:    02/21/20 1847 02/21/20 2034 02/21/20 2158 02/21/20 2230   BP: (!) 116/49 121/64 (!) 96/41 (!) 88/54   Pulse: 80 79 75 74   Resp: 25 16 16 12   Temp: 98.9 °F (37.2 °C)      TempSrc: Oral      SpO2: (!) 89% 90% 90% 91%   Weight: 228 lb 9.6 oz (103.7 kg)      Height: 5' 10\" (1.778 m)            MDM  Number of Diagnoses or Management Options  LIU (acute kidney [facility]) ([facility]):   Enteritis:   Pneumonia of right lower lobe due to infectious organism [facility]):   Diagnosis management comments: Patient was brought to the emergency department for evaluation of fever, altered mental status, and nausea vomiting diarrhea. I notified the Ed attending, Dr. Merced Diaz of this patient and he managed the patient with me. Work-up showed CBC with a normal white blood cell count and H&H compared appears consistent with patient's baseline.   CMP shows a potassium of 3.4, creatinine is 2.2 and  GFR is 28 which is elevated compared to previous labs which show creatinine of 1.2 and GFR 56. Magnesium 1.5. Initial troponin is 0.04, 0.05 repeat, and then third repeat back to normal range. Lactic acid 2.2. Repeat after fluids 1.2. Blood gases show a PCO2 of 34, PO2 of 51 and O2 sat of 88. Patient placed on continuous oxygen at 2 L and sat increased to the low 90s but oxygen sats started to decrease back into the 80's so he was placed on a venti mask and tolerated well. Patient was also given a DuoNeb nebulizer treatment and tolerated well. I had initially notified on call cardiologist, Dr. Dieter Mcdermott, of patient's troponin but third set was normal.    EKG showed Regular rate and rhythm. Normal P waves and normal WA interval. Normal QRS, prolonged QT interval. No ST elevations or ST depressions. No significant Twave abnormalities, however there are some inverted T waves in V3, V4, and V5 which were present on an old EKG I pulled from 2/15/19. Patient continues to deny chest pain. Chest x-ray shows right lower lobe pneumonia, two sets of blood cultures obtained and patient started on cefepime and Vancomycin. Patient was given 500 ml NS en route per EMS and 1 L NS bolus started when he arrived in the emergency department. CT head negative. CT abdomen and pelvis shows enteritis and bilateral lower lobe pneumonia. There is bilateral hydronephrosis and a distended bladder so Robin catheter was placed in 1300 mL's of yellow urine was obtained with urinalysis pending. I discussed this case with the hospitalist, Dr. Paige Mathur, who accepted patient. Patient in stable condition upon admission to the ICU. PROCEDURES:    Procedures      FINAL IMPRESSION      1. Pneumonia of right lower lobe due to infectious organism ([address]    2. LIU (acute kidney injury) ([address]    3.  Enteritis          DISPOSITION/PLAN   DISPOSITION        PATIENT REFERRED TO:  Heri Gamboa MD  [address]            DISCHARGE MEDICATIONS:  New Prescriptions    No

## 2020-02-22 NOTE — PROGRESS NOTES
4 Eyes Skin Assessment    Bel Navarro is being assessed upon: Admission    I agree that Indira Hoover, along with Zoraida Cardona, RN and Fredy Posada, RN(either 2 RN's or 1 LPN and 1 RN) have performed a thorough Head to Toe Skin Assessment on the patient. ALL assessment sites listed below have been assessed. Areas assessed by both nurses:     [x]   Head, Face, and Ears   [x]   Shoulders, Back, and Chest  [x]   Arms, Elbows, and Hands   [x]   Coccyx, Sacrum, and Ischium  [x]   Legs, Feet, and Heels    Does the Patient have Skin Breakdown? Yes, wound(s) noted upon assessment. It is the responsibility of the Primary Nurse to assure that the following documentation, preventions, orders, and consults are complete on the above noted wound(s): Wound LDA initiated. LDA Flowsheet Documentation includes the Nati-wound, Wound Assessment, Measurements, Dressing Treatment, Drainage, and Color. Redness on coccyx; redness, swelling, erythema LLE; rash, 2 open areas on abdomen approximately 0.5 x 0.5 cm.      Michele Prevention initiated: Yes  Wound Care Orders initiated: Yes    [id number] nurse consulted for Pressure Injury (Stage 3,4, Unstageable, DTI, NWPT, and Complex wounds) and New or Established Ostomies: No        Primary Nurse eSignature: Scott Brenner RN on 2/22/2020 at 12:29 AM      Co-Signer eSignature: Electronically signed by Andrez Baptiste RN on 2/22/20 at 5:28 AM Electronically signed by Zoraida Cardona RN on 2/22/2020 at 5:34 AM

## 2020-02-23 LAB
ALBUMIN SERPL-MCNC: 2.3 G/DL (ref 3.5–5.2)
ALP BLD-CCNC: 79 U/L (ref 40–130)
ALT SERPL-CCNC: 15 U/L (ref 5–41)
ANION GAP SERPL CALCULATED.3IONS-SCNC: 12 MMOL/L (ref 7–19)
APTT: 41.2 SEC (ref 26–36.2)
APTT: 46 SEC (ref 26–36.2)
APTT: 79.6 SEC (ref 26–36.2)
APTT: 85.1 SEC (ref 26–36.2)
AST SERPL-CCNC: 24 U/L (ref 5–40)
BASOPHILS ABSOLUTE: 0 K/UL (ref 0–0.2)
BASOPHILS RELATIVE PERCENT: 0.1 % (ref 0–1)
BILIRUB SERPL-MCNC: 0.5 MG/DL (ref 0.2–1.2)
BUN BLDV-MCNC: 28 MG/DL (ref 8–23)
CALCIUM SERPL-MCNC: 7.7 MG/DL (ref 8.2–9.6)
CHLORIDE BLD-SCNC: 107 MMOL/L (ref 98–111)
CO2: 20 MMOL/L (ref 22–29)
CREAT SERPL-MCNC: 1.9 MG/DL (ref 0.5–1.2)
EOSINOPHILS ABSOLUTE: 0 K/UL (ref 0–0.6)
EOSINOPHILS RELATIVE PERCENT: 0.3 % (ref 0–5)
FERRITIN: 530.8 NG/ML (ref 30–400)
FOLATE: 9.2 NG/ML (ref 4.5–32.2)
GFR NON-AFRICAN AMERICAN: 33
GLUCOSE BLD-MCNC: 91 MG/DL (ref 74–109)
HCT VFR BLD CALC: 31.7 % (ref 42–52)
HEMOGLOBIN: 9.9 G/DL (ref 14–18)
IMMATURE GRANULOCYTES #: 0.1 K/UL
INR BLD: 1.34 (ref 0.88–1.18)
IRON SATURATION: 11 % (ref 14–50)
IRON: 15 UG/DL (ref 59–158)
LYMPHOCYTES ABSOLUTE: 1 K/UL (ref 1.1–4.5)
LYMPHOCYTES RELATIVE PERCENT: 11.1 % (ref 20–40)
MAGNESIUM: 1.6 MG/DL (ref 1.7–2.3)
MCH RBC QN AUTO: 32.8 PG (ref 27–31)
MCHC RBC AUTO-ENTMCNC: 31.2 G/DL (ref 33–37)
MCV RBC AUTO: 105 FL (ref 80–94)
MONOCYTES ABSOLUTE: 0.4 K/UL (ref 0–0.9)
MONOCYTES RELATIVE PERCENT: 4.8 % (ref 0–10)
NEUTROPHILS ABSOLUTE: 7.3 K/UL (ref 1.5–7.5)
NEUTROPHILS RELATIVE PERCENT: 83 % (ref 50–65)
PDW BLD-RTO: 14.5 % (ref 11.5–14.5)
PHOSPHORUS: 2.5 MG/DL (ref 2.5–4.5)
PLATELET # BLD: 158 K/UL (ref 130–400)
PMV BLD AUTO: 10.4 FL (ref 9.4–12.4)
POTASSIUM SERPL-SCNC: 3.6 MMOL/L (ref 3.5–5)
PROTHROMBIN TIME: 16.6 SEC (ref 12–14.6)
RBC # BLD: 3.02 M/UL (ref 4.7–6.1)
SODIUM BLD-SCNC: 139 MMOL/L (ref 136–145)
TOTAL IRON BINDING CAPACITY: 139 UG/DL (ref 250–400)
TOTAL PROTEIN: 4.8 G/DL (ref 6.6–8.7)
VITAMIN B-12: 921 PG/ML (ref 211–946)
WBC # BLD: 8.8 K/UL (ref 4.8–10.8)

## 2020-02-23 PROCEDURE — 6360000002 HC RX W HCPCS: Performed by: INTERNAL MEDICINE

## 2020-02-23 PROCEDURE — 2700000000 HC OXYGEN THERAPY PER DAY

## 2020-02-23 PROCEDURE — 83550 IRON BINDING TEST: CPT

## 2020-02-23 PROCEDURE — 6370000000 HC RX 637 (ALT 250 FOR IP): Performed by: INTERNAL MEDICINE

## 2020-02-23 PROCEDURE — 1210000000 HC MED SURG R&B

## 2020-02-23 PROCEDURE — 2580000003 HC RX 258: Performed by: INTERNAL MEDICINE

## 2020-02-23 PROCEDURE — 85610 PROTHROMBIN TIME: CPT

## 2020-02-23 PROCEDURE — 85025 COMPLETE CBC W/AUTO DIFF WBC: CPT

## 2020-02-23 PROCEDURE — 94640 AIRWAY INHALATION TREATMENT: CPT

## 2020-02-23 PROCEDURE — 2580000003 HC RX 258: Performed by: NURSE PRACTITIONER

## 2020-02-23 PROCEDURE — 83735 ASSAY OF MAGNESIUM: CPT

## 2020-02-23 PROCEDURE — 80053 COMPREHEN METABOLIC PANEL: CPT

## 2020-02-23 PROCEDURE — 6360000002 HC RX W HCPCS: Performed by: NURSE PRACTITIONER

## 2020-02-23 PROCEDURE — 85730 THROMBOPLASTIN TIME PARTIAL: CPT

## 2020-02-23 PROCEDURE — 84100 ASSAY OF PHOSPHORUS: CPT

## 2020-02-23 PROCEDURE — 2580000003 HC RX 258: Performed by: HOSPITALIST

## 2020-02-23 PROCEDURE — 82607 VITAMIN B-12: CPT

## 2020-02-23 PROCEDURE — 83540 ASSAY OF IRON: CPT

## 2020-02-23 PROCEDURE — 82728 ASSAY OF FERRITIN: CPT

## 2020-02-23 PROCEDURE — 36415 COLL VENOUS BLD VENIPUNCTURE: CPT

## 2020-02-23 PROCEDURE — 82746 ASSAY OF FOLIC ACID SERUM: CPT

## 2020-02-23 RX ORDER — WARFARIN SODIUM 5 MG/1
5 TABLET ORAL DAILY
Status: DISCONTINUED | OUTPATIENT
Start: 2020-02-23 | End: 2020-02-24

## 2020-02-23 RX ADMIN — IPRATROPIUM BROMIDE AND ALBUTEROL SULFATE 1 AMPULE: .5; 3 SOLUTION RESPIRATORY (INHALATION) at 18:43

## 2020-02-23 RX ADMIN — HEPARIN SODIUM 15 UNITS/KG/HR: 10000 INJECTION, SOLUTION INTRAVENOUS at 11:27

## 2020-02-23 RX ADMIN — HEPARIN SODIUM 15 UNITS/KG/HR: 10000 INJECTION, SOLUTION INTRAVENOUS at 23:00

## 2020-02-23 RX ADMIN — IPRATROPIUM BROMIDE AND ALBUTEROL SULFATE 1 AMPULE: .5; 3 SOLUTION RESPIRATORY (INHALATION) at 06:40

## 2020-02-23 RX ADMIN — TAMSULOSIN HYDROCHLORIDE 0.4 MG: 0.4 CAPSULE ORAL at 22:56

## 2020-02-23 RX ADMIN — IPRATROPIUM BROMIDE AND ALBUTEROL SULFATE 1 AMPULE: .5; 3 SOLUTION RESPIRATORY (INHALATION) at 14:47

## 2020-02-23 RX ADMIN — METRONIDAZOLE 500 MG: 500 TABLET ORAL at 06:14

## 2020-02-23 RX ADMIN — WARFARIN SODIUM 5 MG: 5 TABLET ORAL at 17:14

## 2020-02-23 RX ADMIN — Medication 1 G: at 08:30

## 2020-02-23 RX ADMIN — IPRATROPIUM BROMIDE AND ALBUTEROL SULFATE 1 AMPULE: .5; 3 SOLUTION RESPIRATORY (INHALATION) at 10:05

## 2020-02-23 RX ADMIN — Medication 1 G: at 22:56

## 2020-02-23 RX ADMIN — METRONIDAZOLE 500 MG: 500 TABLET ORAL at 13:10

## 2020-02-23 RX ADMIN — Medication 10 ML: at 08:19

## 2020-02-23 RX ADMIN — HEPARIN SODIUM 4170 UNITS: 1000 INJECTION INTRAVENOUS; SUBCUTANEOUS at 14:18

## 2020-02-23 RX ADMIN — Medication 10 ML: at 23:04

## 2020-02-23 RX ADMIN — SODIUM CHLORIDE: 9 INJECTION, SOLUTION INTRAVENOUS at 03:24

## 2020-02-23 RX ADMIN — METRONIDAZOLE 500 MG: 500 TABLET ORAL at 22:56

## 2020-02-23 RX ADMIN — FINASTERIDE 5 MG: 5 TABLET, FILM COATED ORAL at 08:18

## 2020-02-23 RX ADMIN — TAMSULOSIN HYDROCHLORIDE 0.4 MG: 0.4 CAPSULE ORAL at 08:18

## 2020-02-23 ASSESSMENT — ENCOUNTER SYMPTOMS
DIARRHEA: 0
SHORTNESS OF BREATH: 0
CONSTIPATION: 0
BACK PAIN: 0
NAUSEA: 0
VOMITING: 0

## 2020-02-23 ASSESSMENT — PAIN SCALES - GENERAL: PAINLEVEL_OUTOF10: 0

## 2020-02-23 NOTE — PROGRESS NOTES
Rylie Sutherland transferred to 330 from 145 via bed. Reason for transfer: Lower Level of Care   Explained reason for transfer to Patient. Belongings: None location is not applicable . Soft chart transferred with patient: Yes. Telemetry box number transferred with patient: yes. Report given to: Joann Lopez, via telephone.       Electronically signed by Dunia Berrios RN on 2/23/2020 at 11:24 AM

## 2020-02-23 NOTE — PROGRESS NOTES
Hospitalist Progress Note    Patient:  Hattie Butler  YOB: 1925  Date of Service: 2/23/2020  MRN: [id number]   Acct: [de-identified]   Primary Care Physician: Mckinley Dowell MD  Advance Directive: [facility]  Admit Date: 2/21/2020       Hospital Day: 2  Referring Provider: Vaughn Barrera DO    Patient Seen, Chart, Consults, Notes, Labs, Radiology studies reviewed. Subjective:  Hattie Butler is a 80 y.o. male  whom we are following for bladder outlet obstruction, acute kidney injury, enteritis. He is doing better. He is currently on nasal cannula. GFR is improving now with Robin drainage. He has had almost 4 L out since his hospitalization. Blood pressure is improving. He did have a DVT in his left lower extremity. He is on weight-based unfractionated heparin. I will dose him with Coumadin today. Diarrhea is also waning. I will advance his diet today.     Allergies:  Ketek [telithromycin]    Medicines:  Current Facility-Administered Medications   Medication Dose Route Frequency Provider Last Rate Last Dose    metroNIDAZOLE (FLAGYL) tablet 500 mg  500 mg Oral 3 times per day Vaughn Sinks, DO   500 mg at 02/23/20 1161    tamsulosin (FLOMAX) capsule 0.4 mg  0.4 mg Oral BID Vaughn Sinks, DO   0.4 mg at 02/22/20 2059    finasteride (PROSCAR) tablet 5 mg  5 mg Oral Daily Vaughn Sinks, DO   5 mg at 02/22/20 0225    heparin (porcine) injection 8,340 Units  80 Units/kg Intravenous PRN Vaughn Sinks, DO        heparin (porcine) injection 4,170 Units  40 Units/kg Intravenous PRN Vaughn Sinks, DO        heparin 25,000 units in dextrose 5% 250 mL infusion  18 Units/kg/hr Intravenous Continuous Vaughn Sinks, DO 15.6 mL/hr at 02/23/20 0240 15 Units/kg/hr at 02/23/20 0240    ipratropium-albuterol (DUONEB) nebulizer solution 1 ampule  1 ampule Inhalation Q4H WA Vaughn Sinks, DO   1 ampule at 02/23/20 0640    cefepime (MAXIPIME) 1 g in sodium chloride (PF) 02/22/20  1458 02/22/20 2007 02/23/20 0117   APTT 29.0 >200.0* 41.2*     BNP:  No results for input(s): BNP in the last 72 hours. Ionized Calcium:No results for input(s): IONCA in the last 72 hours. Magnesium:  Recent Labs     02/21/20 1900 02/23/20 0117   MG 1.5*  1.5* 1.6*     Phosphorus:  Recent Labs     02/23/20 0117   PHOS 2.5     HgbA1C: No results for input(s): LABA1C in the last 72 hours. Hepatic:   Recent Labs     02/21/20 1900 02/23/20 0117   ALKPHOS 135* 79   ALT 22 15   AST 31 24   PROT 6.1* 4.8*   BILITOT 0.9 0.5   LABALBU 3.3* 2.3*     Lactic Acid:   Recent Labs     02/21/20 2108   LACTA 1.2     Troponin: No results for input(s): CKTOTAL, CKMB, TROPONINT in the last 72 hours. ABGs: No results for input(s): PH, PCO2, PO2, HCO3, O2SAT in the last 72 hours. CRP:    Recent Labs     02/21/20 1900   CRP 3.72*     Sed Rate:    Recent Labs     02/21/20 1900   SEDRATE 18*       Cultures:   No results for input(s): CULTURE in the last 72 hours. Recent Labs     02/21/20 1900 02/21/20 2108   BC No Growth to date. Any change in status will be called. --    BLOODCULT2  --  No Growth to date. Any change in status will be called. No results for input(s): CXSURG in the last 72 hours. Radiology reports as per the Radiologist  Radiology: Ct Abdomen Pelvis Wo Contrast Additional Contrast? Oral    Result Date: 2/21/2020  EXAMINATION: CT ABDOMEN PELVIS WO CONTRAST 2/21/2020 10:17 PM HISTORY: Abdominal pain, nausea, vomiting and diarrhea. DOSE: 1580 mGycm. Automatic exposure control was utilized in an effort to use as little radiation as possible, without compromising image quality. REPORT: Spiral CT of the abdomen and pelvis was performed without contrast from the lung bases through the pubic symphysis. Reconstructed coronal and sagittal images are also reviewed. COMPARISON: There are no correlative imaging studies for comparison.  Review of lung windows demonstrates moderate respiratory motion artifact and extensive infiltrate in the lower lobes greater on the right, compatible with pneumonia. No pneumothorax is identified. The lungs are hyperinflated compatible COPD. There is a vague subpleural nodule in the right middle lobe axial image 3, which measures approximately 4 mm. There is heavy calcified plaque in the coronary arteries, greatest at the LAD. There is ectasia of the ascending aorta. Heart size appears normal. There is trace pericardial fluid. This moderate streak artifact over the upper abdomen related to dense oral contrast in the stomach. Cholecystectomy clips are present. There is decreased attenuation of the liver diffusely compatible with fatty infiltration. The spleen is homogeneous and normal in size. The pancreas and adrenal glands appear grossly normal. There is moderate bilateral hydronephrosis and dilation of the ureters which are tortuous and marked distention of the bladder is identified. There is mild circumferential bladder wall thickening. There are cysts within both kidneys and exophytic isoattenuating nodules and hyperattenuating nodules are seen at the kidneys. Some these may represent proteinaceous cysts, however small solid renal masses cannot be excluded and follow-up renal ultrasound is recommended. This includes a 13 mm nodule at the upper pole the right kidney, a exophytic nodule that measures 1.8 cm, at the mid right kidney, exophytic nodule at the upper pole the left kidney which is probably a cyst, measuring 1.4 cm, an exophytic nodule at the anterior cortex of the left upper pole measuring 1.2 cm. Bowel loops are normal caliber, there is mild fluid retention within the GI tract, without bowel wall thickening. Correlate clinically for enteritis.  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

## 2020-02-24 PROBLEM — Z51.5 PALLIATIVE CARE PATIENT: Status: ACTIVE | Noted: 2020-02-24

## 2020-02-24 LAB
ALBUMIN SERPL-MCNC: 2.9 G/DL (ref 3.5–5.2)
ALP BLD-CCNC: 87 U/L (ref 40–130)
ALT SERPL-CCNC: 16 U/L (ref 5–41)
ANION GAP SERPL CALCULATED.3IONS-SCNC: 12 MMOL/L (ref 7–19)
APTT: 134.3 SEC (ref 26–36.2)
APTT: 183.6 SEC (ref 26–36.2)
APTT: 71 SEC (ref 26–36.2)
APTT: 81.5 SEC (ref 26–36.2)
AST SERPL-CCNC: 24 U/L (ref 5–40)
BILIRUB SERPL-MCNC: 0.4 MG/DL (ref 0.2–1.2)
BUN BLDV-MCNC: 23 MG/DL (ref 8–23)
CALCIUM SERPL-MCNC: 8.2 MG/DL (ref 8.2–9.6)
CAMPYLOBACTER ANTIGEN: NORMAL
CHLORIDE BLD-SCNC: 111 MMOL/L (ref 98–111)
CO2: 20 MMOL/L (ref 22–29)
CREAT SERPL-MCNC: 1.5 MG/DL (ref 0.5–1.2)
CULTURE, STOOL: NORMAL
E COLI SHIGA TOXIN ASSAY: NORMAL
EKG P AXIS: 4 DEGREES
EKG P AXIS: 44 DEGREES
EKG P AXIS: 53 DEGREES
EKG P-R INTERVAL: 158 MS
EKG P-R INTERVAL: 184 MS
EKG P-R INTERVAL: 202 MS
EKG Q-T INTERVAL: 326 MS
EKG Q-T INTERVAL: 380 MS
EKG Q-T INTERVAL: 390 MS
EKG QRS DURATION: 94 MS
EKG QRS DURATION: 94 MS
EKG QRS DURATION: 96 MS
EKG QTC CALCULATION (BAZETT): 354 MS
EKG QTC CALCULATION (BAZETT): 415 MS
EKG QTC CALCULATION (BAZETT): 419 MS
EKG T AXIS: -26 DEGREES
EKG T AXIS: 123 DEGREES
EKG T AXIS: 177 DEGREES
GFR NON-AFRICAN AMERICAN: 44
GLUCOSE BLD-MCNC: 115 MG/DL (ref 74–109)
HCT VFR BLD CALC: 32.8 % (ref 42–52)
HEMOGLOBIN: 10.9 G/DL (ref 14–18)
INR BLD: 1.21 (ref 0.88–1.18)
MCH RBC QN AUTO: 32.4 PG (ref 27–31)
MCHC RBC AUTO-ENTMCNC: 33.2 G/DL (ref 33–37)
MCV RBC AUTO: 97.6 FL (ref 80–94)
PDW BLD-RTO: 14.2 % (ref 11.5–14.5)
PLATELET # BLD: 171 K/UL (ref 130–400)
PLATELET # BLD: 184 K/UL (ref 130–400)
PMV BLD AUTO: 10 FL (ref 9.4–12.4)
POTASSIUM SERPL-SCNC: 3.2 MMOL/L (ref 3.5–5)
PROTHROMBIN TIME: 15.3 SEC (ref 12–14.6)
RBC # BLD: 3.36 M/UL (ref 4.7–6.1)
SODIUM BLD-SCNC: 143 MMOL/L (ref 136–145)
TOTAL PROTEIN: 5.3 G/DL (ref 6.6–8.7)
WBC # BLD: 7.8 K/UL (ref 4.8–10.8)

## 2020-02-24 PROCEDURE — 93010 ELECTROCARDIOGRAM REPORT: CPT | Performed by: INTERNAL MEDICINE

## 2020-02-24 PROCEDURE — 85027 COMPLETE CBC AUTOMATED: CPT

## 2020-02-24 PROCEDURE — 2700000000 HC OXYGEN THERAPY PER DAY

## 2020-02-24 PROCEDURE — 6370000000 HC RX 637 (ALT 250 FOR IP): Performed by: INTERNAL MEDICINE

## 2020-02-24 PROCEDURE — 36415 COLL VENOUS BLD VENIPUNCTURE: CPT

## 2020-02-24 PROCEDURE — 85049 AUTOMATED PLATELET COUNT: CPT

## 2020-02-24 PROCEDURE — 85610 PROTHROMBIN TIME: CPT

## 2020-02-24 PROCEDURE — 1210000000 HC MED SURG R&B

## 2020-02-24 PROCEDURE — 85730 THROMBOPLASTIN TIME PARTIAL: CPT

## 2020-02-24 PROCEDURE — 6370000000 HC RX 637 (ALT 250 FOR IP): Performed by: FAMILY MEDICINE

## 2020-02-24 PROCEDURE — 80053 COMPREHEN METABOLIC PANEL: CPT

## 2020-02-24 PROCEDURE — 2580000003 HC RX 258: Performed by: INTERNAL MEDICINE

## 2020-02-24 PROCEDURE — 6360000002 HC RX W HCPCS: Performed by: INTERNAL MEDICINE

## 2020-02-24 PROCEDURE — 2500000003 HC RX 250 WO HCPCS: Performed by: FAMILY MEDICINE

## 2020-02-24 PROCEDURE — 94640 AIRWAY INHALATION TREATMENT: CPT

## 2020-02-24 RX ORDER — LANOLIN ALCOHOL/MO/W.PET/CERES
400 CREAM (GRAM) TOPICAL 2 TIMES DAILY
Status: DISCONTINUED | OUTPATIENT
Start: 2020-02-24 | End: 2020-02-25

## 2020-02-24 RX ORDER — DOXYCYCLINE HYCLATE 100 MG/1
100 CAPSULE ORAL EVERY 12 HOURS SCHEDULED
Status: DISCONTINUED | OUTPATIENT
Start: 2020-02-24 | End: 2020-02-26 | Stop reason: HOSPADM

## 2020-02-24 RX ORDER — POTASSIUM CHLORIDE 20 MEQ/1
40 TABLET, EXTENDED RELEASE ORAL ONCE
Status: COMPLETED | OUTPATIENT
Start: 2020-02-24 | End: 2020-02-24

## 2020-02-24 RX ADMIN — HEPARIN SODIUM 11 UNITS/KG/HR: 10000 INJECTION, SOLUTION INTRAVENOUS at 08:35

## 2020-02-24 RX ADMIN — IPRATROPIUM BROMIDE AND ALBUTEROL SULFATE 1 AMPULE: .5; 3 SOLUTION RESPIRATORY (INHALATION) at 18:32

## 2020-02-24 RX ADMIN — IPRATROPIUM BROMIDE AND ALBUTEROL SULFATE 1 AMPULE: .5; 3 SOLUTION RESPIRATORY (INHALATION) at 10:55

## 2020-02-24 RX ADMIN — WARFARIN SODIUM 7.5 MG: 2.5 TABLET ORAL at 17:33

## 2020-02-24 RX ADMIN — MICONAZOLE NITRATE: 20 POWDER TOPICAL at 23:37

## 2020-02-24 RX ADMIN — FINASTERIDE 5 MG: 5 TABLET, FILM COATED ORAL at 08:35

## 2020-02-24 RX ADMIN — POTASSIUM CHLORIDE 40 MEQ: 1500 TABLET, EXTENDED RELEASE ORAL at 13:12

## 2020-02-24 RX ADMIN — Medication 1 G: at 08:35

## 2020-02-24 RX ADMIN — METRONIDAZOLE 500 MG: 500 TABLET ORAL at 23:37

## 2020-02-24 RX ADMIN — SODIUM CHLORIDE: 9 INJECTION, SOLUTION INTRAVENOUS at 04:26

## 2020-02-24 RX ADMIN — Medication 400 MG: at 23:38

## 2020-02-24 RX ADMIN — IPRATROPIUM BROMIDE AND ALBUTEROL SULFATE 1 AMPULE: .5; 3 SOLUTION RESPIRATORY (INHALATION) at 06:49

## 2020-02-24 RX ADMIN — IPRATROPIUM BROMIDE AND ALBUTEROL SULFATE 1 AMPULE: .5; 3 SOLUTION RESPIRATORY (INHALATION) at 15:36

## 2020-02-24 RX ADMIN — Medication 10 ML: at 23:37

## 2020-02-24 RX ADMIN — DOXYCYCLINE HYCLATE 100 MG: 100 CAPSULE ORAL at 23:37

## 2020-02-24 RX ADMIN — TAMSULOSIN HYDROCHLORIDE 0.4 MG: 0.4 CAPSULE ORAL at 23:37

## 2020-02-24 RX ADMIN — METRONIDAZOLE 500 MG: 500 TABLET ORAL at 13:12

## 2020-02-24 RX ADMIN — METRONIDAZOLE 500 MG: 500 TABLET ORAL at 05:38

## 2020-02-24 RX ADMIN — TAMSULOSIN HYDROCHLORIDE 0.4 MG: 0.4 CAPSULE ORAL at 08:34

## 2020-02-24 ASSESSMENT — PAIN SCALES - GENERAL: PAINLEVEL_OUTOF10: 0

## 2020-02-24 ASSESSMENT — ENCOUNTER SYMPTOMS
NAUSEA: 0
SHORTNESS OF BREATH: 0
VOMITING: 0
CONSTIPATION: 0
DIARRHEA: 0
ABDOMINAL DISTENTION: 1
BACK PAIN: 0

## 2020-02-24 ASSESSMENT — PULMONARY FUNCTION TESTS: PEFR_L/MIN: 16

## 2020-02-24 NOTE — PLAN OF CARE
Problem: Risk for Impaired Skin Integrity  Goal: Tissue integrity - skin and mucous membranes  Description  Structural intactness and normal physiological function of skin and  mucous membranes.   Outcome: Ongoing     Problem: Falls - Risk of:  Goal: Will remain free from falls  Description  Will remain free from falls  Outcome: Ongoing  Goal: Absence of physical injury  Description  Absence of physical injury  Outcome: Ongoing     Problem: Bleeding:  Goal: Will show no signs and symptoms of excessive bleeding  Description  Will show no signs and symptoms of excessive bleeding  Outcome: Ongoing     Problem: Venous Thromboembolism:  Goal: Will show no signs or symptoms of venous thromboembolism  Description  Will show no signs or symptoms of venous thromboembolism  Outcome: Ongoing  Goal: Absence of signs or symptoms of impaired coagulation  Description  Absence of signs or symptoms of impaired coagulation  Outcome: Ongoing     Problem: Discharge Planning:  Goal: Discharged to appropriate level of care  Description  Discharged to appropriate level of care  Outcome: Ongoing  Goal: Participates in care planning  Description  Participates in care planning  Outcome: Ongoing     Problem: Airway Clearance - Ineffective:  Goal: Clear lung sounds  Description  Clear lung sounds  Outcome: Ongoing  Goal: Ability to maintain a clear airway will improve  Description  Ability to maintain a clear airway will improve  Outcome: Ongoing     Problem: Fluid Volume - Deficit:  Goal: Achieves intake and output within specified parameters  Description  Achieves intake and output within specified parameters  Outcome: Ongoing     Problem: Gas Exchange - Impaired:  Goal: Levels of oxygenation will improve  Description  Levels of oxygenation will improve  Outcome: Ongoing     Problem: Hyperthermia:  Goal: Ability to maintain a body temperature in the normal range will improve  Description  Ability to maintain a body temperature in the normal improve  Outcome: Ongoing  Goal: Will remain free from infection  Description  Will remain free from infection  Outcome: Ongoing  Goal: Ability to maintain vital signs within normal range will improve  Description  Ability to maintain vital signs within normal range will improve  Outcome: Ongoing     Problem: Safety:  Goal: Ability to appropriately use an adaptive device for ambulation will improve  Description  Ability to appropriately use an adaptive device for ambulation will improve  Outcome: Ongoing  Goal: Ability to safely and independently change position in bed will improve  Description  Ability to safely and independently change position in bed will improve  Outcome: Ongoing  Goal: Ability to safely transfer will improve  Description  Ability to safely transfer will improve  Outcome: Ongoing  Goal: Free from accidental physical injury  Description  Free from accidental physical injury  Outcome: Ongoing  Goal: Free from intentional harm  Description  Free from intentional harm  Outcome: Ongoing     Problem: ABCDS Injury Assessment  Goal: Absence of physical injury  Outcome: Ongoing     Problem: Nutritional:  Goal: Consumption of the prescribed amount of daily calories will improve  Description  Consumption of the prescribed amount of daily calories will improve  Outcome: Ongoing  Goal: Ability to make healthy dietary choices will improve  Description  Ability to make healthy dietary choices will improve  Outcome: Ongoing  Goal: Progress toward achieving an optimal weight will improve  Description  Progress toward achieving an optimal weight will improve  Outcome: Ongoing  Goal: Nutritional status will improve  Description  Nutritional status will improve  Outcome: Ongoing     Problem: Respiratory:  Goal: Ability to maintain normal respiratory secretions will improve  Description  Ability to maintain normal respiratory secretions will improve  Outcome: Ongoing     Problem: Tissue Perfusion - Peripheral,

## 2020-02-24 NOTE — PLAN OF CARE
Clearance - Ineffective:  Goal: Clear lung sounds  Description  Clear lung sounds  2/24/2020 1308 by Ivette Baig RN  Outcome: Ongoing  2/24/2020 0815 by Yonatan Uriarte RN  Outcome: Ongoing  Goal: Ability to maintain a clear airway will improve  Description  Ability to maintain a clear airway will improve  2/24/2020 1308 by Ivette Baig RN  Outcome: Ongoing  2/24/2020 0815 by Yonatan Uriarte RN  Outcome: Ongoing     Problem: Fluid Volume - Deficit:  Goal: Achieves intake and output within specified parameters  Description  Achieves intake and output within specified parameters  2/24/2020 1308 by Ivette Baig RN  Outcome: Ongoing  2/24/2020 0815 by Yonatan Uriarte RN  Outcome: Ongoing     Problem: Gas Exchange - Impaired:  Goal: Levels of oxygenation will improve  Description  Levels of oxygenation will improve  2/24/2020 1308 by Ivette Baig RN  Outcome: Ongoing  2/24/2020 0815 by Yonatan Uriarte RN  Outcome: Ongoing     Problem: Hyperthermia:  Goal: Ability to maintain a body temperature in the normal range will improve  Description  Ability to maintain a body temperature in the normal range will improve  2/24/2020 1308 by Ivette Baig RN  Outcome: Ongoing  2/24/2020 0815 by Yonatan Uriarte RN  Outcome: Ongoing     Problem: Tobacco Use:  Goal: Will participate in inpatient tobacco-use cessation counseling  Description  Will participate in inpatient tobacco-use cessation counseling  2/24/2020 1308 by Ivette Baig RN  Outcome: Ongoing  2/24/2020 0815 by Yonatan Uriarte RN  Outcome: Ongoing     Problem: Skin Integrity:  Goal: Will show no infection signs and symptoms  Description  Will show no infection signs and symptoms  2/24/2020 1308 by Ivette Baig RN  Outcome: Ongoing  2/24/2020 0815 by Yonatan Uriarte RN  Outcome: Ongoing  Goal: Absence of new skin breakdown  Description  Absence of new skin breakdown  2/24/2020 1308 by Ivette Baig RN  Outcome: Ongoing  2/24/2020 0815 by Yonatan Uriarte RN  Outcome: Ongoing  Goal: Demonstration of wound healing without infection will improve  Description  Demonstration of wound healing without infection will improve  2/24/2020 1308 by Jennifer Méndez RN  Outcome: Ongoing  2/24/2020 0815 by Wil Back RN  Outcome: Ongoing  Goal: Complications related to intravenous access or infusion will be avoided or minimized  Description  Complications related to intravenous access or infusion will be avoided or minimized  2/24/2020 1308 by Jennifer Méndez RN  Outcome: Ongoing  2/24/2020 0815 by Wil Back RN  Outcome: Ongoing     Problem: Nutrition Deficit:  Goal: Ability to achieve adequate nutritional intake will improve  Description  Ability to achieve adequate nutritional intake will improve  2/24/2020 1308 by Jennifer Méndez RN  Outcome: Ongoing  2/24/2020 0815 by Wil Back RN  Outcome: Ongoing     Problem:  Activity:  Goal: Ability to ambulate will improve  Description  Ability to ambulate will improve  2/24/2020 1308 by Jennifer Méndez RN  Outcome: Ongoing  2/24/2020 0815 by Wil Back RN  Outcome: Ongoing  Goal: Muscle strength will improve  Description  Muscle strength will improve  2/24/2020 1308 by Jennifer Méndez RN  Outcome: Ongoing  2/24/2020 0815 by Wil Back RN  Outcome: Ongoing  Goal: Range of joint motion will improve  Description  Range of joint motion will improve  2/24/2020 1308 by Jennifer Méndez RN  Outcome: Ongoing  2/24/2020 0815 by Wil Back RN  Outcome: Ongoing  Goal: Amount of time patient spends in regular exercise will increase  Description  Amount of time patient spends in regular exercise will increase  2/24/2020 1308 by Jennifer Méndez RN  Outcome: Ongoing  2/24/2020 0815 by Wil Back RN  Outcome: Ongoing  Goal: Sleep-wake cycle will improve  Description  Sleep-wake cycle will improve  2/24/2020 1308 by Jennifer Méndez RN  Outcome: Ongoing  2/24/2020 0815 by Wil Back RN  Outcome: Ongoing     Problem: Physical Regulation:  Goal: Ability to avoid complications of mobility impairment will improve  Description  Ability to avoid complications of mobility impairment will improve  2/24/2020 1308 by Germán Vivas RN  Outcome: Ongoing  2/24/2020 0815 by Ag Wolf RN  Outcome: Ongoing  Goal: Diagnostic test results will improve  Description  Diagnostic test results will improve  2/24/2020 1308 by Germán Vivsa RN  Outcome: Ongoing  2/24/2020 0815 by Ag Wolf RN  Outcome: Ongoing  Goal: Will remain free from infection  Description  Will remain free from infection  2/24/2020 1308 by Germán Vivas RN  Outcome: Ongoing  2/24/2020 0815 by Ag Wolf RN  Outcome: Ongoing  Goal: Ability to maintain vital signs within normal range will improve  Description  Ability to maintain vital signs within normal range will improve  2/24/2020 1308 by Germán Vivas RN  Outcome: Ongoing  2/24/2020 0815 by Ag Wolf RN  Outcome: Ongoing     Problem: Safety:  Goal: Ability to appropriately use an adaptive device for ambulation will improve  Description  Ability to appropriately use an adaptive device for ambulation will improve  2/24/2020 1308 by Germán Vivas RN  Outcome: Ongoing  2/24/2020 0815 by Ag Wolf RN  Outcome: Ongoing  Goal: Ability to safely and independently change position in bed will improve  Description  Ability to safely and independently change position in bed will improve  2/24/2020 1308 by Germán Vivas RN  Outcome: Ongoing  2/24/2020 0815 by Ag Wolf RN  Outcome: Ongoing  Goal: Ability to safely transfer will improve  Description  Ability to safely transfer will improve  2/24/2020 1308 by Germán Vivas RN  Outcome: Ongoing  2/24/2020 0815 by gA Wolf RN  Outcome: Ongoing  Goal: Free from accidental physical injury  Description  Free from accidental physical injury  2/24/2020 1308 by Germán Vivas RN  Outcome: Ongoing  2/24/2020 0815 by Ag Wolf RN  Outcome: Ongoing  Goal: Free from intentional harm  Description  Free from intentional harm  2/24/2020 1308 by Alisha Modi RN  Outcome: Ongoing  2/24/2020 0815 by Suze Hayes RN  Outcome: Ongoing     Problem: ABCDS Injury Assessment  Goal: Absence of physical injury  2/24/2020 1308 by Alisha Modi RN  Outcome: Ongoing  2/24/2020 0815 by Suze Hayes RN  Outcome: Ongoing     Problem: Nutritional:  Goal: Consumption of the prescribed amount of daily calories will improve  Description  Consumption of the prescribed amount of daily calories will improve  2/24/2020 1308 by Alisha Modi RN  Outcome: Ongoing  2/24/2020 0815 by Suze Hayes RN  Outcome: Ongoing  Goal: Ability to make healthy dietary choices will improve  Description  Ability to make healthy dietary choices will improve  2/24/2020 1308 by Alisha Modi RN  Outcome: Ongoing  2/24/2020 0815 by Suze Hayes RN  Outcome: Ongoing  Goal: Progress toward achieving an optimal weight will improve  Description  Progress toward achieving an optimal weight will improve  2/24/2020 1308 by Alisha Modi RN  Outcome: Ongoing  2/24/2020 0815 by Suze Hayes RN  Outcome: Ongoing  Goal: Nutritional status will improve  Description  Nutritional status will improve  2/24/2020 1308 by Alisha Modi RN  Outcome: Ongoing  2/24/2020 0815 by Suze Hayes RN  Outcome: Ongoing     Problem: Respiratory:  Goal: Ability to maintain normal respiratory secretions will improve  Description  Ability to maintain normal respiratory secretions will improve  2/24/2020 1308 by Alisha Modi RN  Outcome: Ongoing  2/24/2020 0815 by Suze Hayes RN  Outcome: Ongoing     Problem: Tissue Perfusion - Peripheral, Altered:  Goal: Electrolytes within specified parameters  Description  Electrolytes within specified parameters  2/24/2020 0815 by Suze Hayes RN  Outcome: Ongoing  Goal: Hemodynamic stability will improve  Description  Hemodynamic stability will improve  2/24/2020 0815 by Suze Hayes RN  Outcome:

## 2020-02-24 NOTE — PROGRESS NOTES
Hospitalist Progress Note    Patient:  Lupis Clark  YOB: 1925  Date of Service: 2/24/2020  MRN: [id number]   Acct: [de-identified]   Primary Care Physician: Maximilian Sol MD  Advance Directive: [facility]  Admit Date: 2/21/2020       Hospital Day: 3  Referring Provider: Jazzy Acevedo MD    Patient Seen, Chart, Consults, Notes, Labs, Radiology studies reviewed. -Patient has been stepdown out of intensive care unit, first interaction with patient 2/24/2020    Subjective:  Lupis Clark is a 80 y.o. male  whom we are following for bladder outlet obstruction, acute kidney injury, enteritis, left lower extremity DVT    Patient seen and examined this a.m., family member present at the bedside. Patient states he feels much improved and initial presentation to the hospital.  Feels as though his breathing has improved, utilizes home oxygen at nighttime on a regular basis. Cumulative hospital course: Patient admitted to [facility] 2/21, presented from outlying nursing home facility work-up revealed bilateral pneumonia, enteritis on CT abdomen and pelvis, bilateral hydronephrosis and urinary outlet obstruction. Patient was diagnosed with acute hypoxemic respiratory failure, admitted to the intensive care unit, treated with IV antibiotics Robin catheter was placed placed on oxygen therapy. Due to findings of enteritis gastroenterology was consulted no plans for current intervention. She was found to have subacute DVT of the left lower extremity currently on heparin therapy has been transitioned to Coumadin currently following INR which is subtherapeutic.   Blood cultures revealed no growth to date, cultures normal.      Allergies:  Ketek [telithromycin]    Medicines:  Current Facility-Administered Medications   Medication Dose Route Frequency Provider Last Rate Last Dose    miconazole (MICOTIN) 2 % powder   Topical BID Jazzy Acevedo MD        warfarin (COUMADIN) tablet 5 mg  5 mg Oral Daily Octavia Lancaster DO 20 mL/hr at 02/24/20 2549      potassium chloride (KLOR-CON M) extended release tablet 40 mEq  40 mEq Oral PRN Octavia Lancaster DO        Or    potassium bicarb-citric acid (EFFER-K) effervescent tablet 40 mEq  40 mEq Oral PRN Octavia Lancaster DO        Or    potassium chloride 10 mEq/100 mL IVPB (Peripheral Line)  10 mEq Intravenous PRN Octavia Lancaster, DO        potassium chloride 10 mEq/100 mL IVPB (Peripheral Line)  10 mEq Intravenous PRN Octavia Lancaster DO        magnesium sulfate 2 g in 50 mL IVPB premix  2 g Intravenous PRN Octavia Lancaster DO           Past Medical History:  Past Medical History:   Diagnosis Date    Cancer [facility])     prostate    GERD (gastroesophageal reflux disease)     Hyperlipidemia     Hypertension     Palliative care patient 02/24/2020    TIA (transient ischemic attack)        Past Surgical History:  Past Surgical History:   Procedure Laterality Date    ANKLE FUSION      APPENDECTOMY      CHOLECYSTECTOMY      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    KNEE ARTHROPLASTY      bilateral       Family History  Family History   Problem Relation Age of Onset    Diabetes Mother     Heart Disease Mother     Heart Attack Mother     Other Mother         cerebral hemorrhage    Diabetes Father     Other Other         all 5 siblings have diabetes    Other Sister         rheumatic fever       Social History  Social History     Socioeconomic History    Marital status:      Spouse name: Not on file    Number of children: Not on file    Years of education: Not on file    Highest education level: Not on file   Occupational History    Not on file   Social Needs    Financial resource strain: Not on file    Food insecurity:     Worry: Not on file     Inability: Not on file    Transportation needs:     Medical: Not on file     Non-medical: Not on file   Tobacco Use    Smoking status: Never Smoker    Smokeless tobacco: Never Used   Substance and Sexual Activity    Alcohol use: No    Drug use: No    Sexual activity: Not on file   Lifestyle    Physical activity:     Days per week: Not on file     Minutes per session: Not on file    Stress: Not on file   Relationships    Social connections:     Talks on phone: Not on file     Gets together: Not on file     Attends Zoroastrianism service: Not on file     Active member of club or organization: Not on file     Attends meetings of clubs or organizations: Not on file     Relationship status: Not on file    Intimate partner violence:     Fear of current or ex partner: Not on file     Emotionally abused: Not on file     Physically abused: Not on file     Forced sexual activity: Not on file   Other Topics Concern    Not on file   Social History Narrative    Not on file         Review of Systems:    Review of Systems   Constitutional: Positive for fatigue. Negative for activity change and fever. Respiratory: Negative for shortness of breath. Cardiovascular: Negative for chest pain and leg swelling. Gastrointestinal: Positive for abdominal distention. Negative for constipation, diarrhea, nausea and vomiting. Genitourinary: Negative for difficulty urinating and dysuria. Musculoskeletal: Positive for gait problem. Negative for back pain and neck pain. Neurological: Positive for weakness. Negative for dizziness and light-headedness. Objective:  Blood pressure 129/64, pulse 71, temperature 97.5 °F (36.4 °C), temperature source Temporal, resp. rate 16, height 5' 10\" (1.778 m), weight 230 lb (104.3 kg), SpO2 96 %. Intake/Output Summary (Last 24 hours) at 2/24/2020 1040  Last data filed at 2/24/2020 0915  Gross per 24 hour   Intake 2192.16 ml   Output 900 ml   Net 1292.16 ml       Physical Exam  Vitals signs reviewed. Constitutional:       Appearance: He is well-developed. HENT:      Head: Normocephalic and atraumatic.       Nose: Nose normal.   Eyes: approximately 4 mm. There is heavy calcified plaque in the coronary arteries, greatest at the LAD. There is ectasia of the ascending aorta. Heart size appears normal. There is trace pericardial fluid. This moderate streak artifact over the upper abdomen related to dense oral contrast in the stomach. Cholecystectomy clips are present. There is decreased attenuation of the liver diffusely compatible with fatty infiltration. The spleen is homogeneous and normal in size. The pancreas and adrenal glands appear grossly normal. There is moderate bilateral hydronephrosis and dilation of the ureters which are tortuous and marked distention of the bladder is identified. There is mild circumferential bladder wall thickening. There are cysts within both kidneys and exophytic isoattenuating nodules and hyperattenuating nodules are seen at the kidneys. Some these may represent proteinaceous cysts, however small solid renal masses cannot be excluded and follow-up renal ultrasound is recommended. This includes a 13 mm nodule at the upper pole the right kidney, a exophytic nodule that measures 1.8 cm, at the mid right kidney, exophytic nodule at the upper pole the left kidney which is probably a cyst, measuring 1.4 cm, an exophytic nodule at the anterior cortex of the left upper pole measuring 1.2 cm. Bowel loops are normal caliber, there is mild fluid retention within the GI tract, without bowel wall thickening. Correlate clinically for enteritis. There is a fusiform infrarenal abdominal aorta, with a maximum transverse diameter 4.3 cm, aneurysmal dilation of the iliac arteries is also identified, the left common iliac artery has a maximum diameter 2.1 cm, the right common iliac artery has a maximum diameter 1.9 cm. The left internal iliac artery has a maximum diameter 2.5 cm. There is increased attenuation of body wall fat planes over the flank regions greater on the left, with overlying skin thickening.  This probably represent represents volume overload and mild anasarca. Review of bone windows shows severe degenerative changes throughout the lower thoracic and entire lumbar spine with multiple vacuum discs, disc space collapse and endplate spurring. 1. Retention of fluid within the GI tract probably related to enteritis, without evidence of bowel obstruction or significant bowel wall thickening. No free air is identified. 2. Moderate bilateral hydronephrosis and hydroureter, with marked distention of the bladder, likely related to chronic urinary retention. Follow-up ultrasound of kidneys is recommended to evaluate multiple exophytic lesions, some which appear to represent simple cysts, others probably represent proteinaceous cysts, versus small solid nodules. 3. Fusiform infrarenal abdominal aortic aneurysm with a maximum diameter of 4.3 cm, there is also aneurysmal dilation of the iliac arteries. 4. Infiltrates present in the lower lobes of both lungs, greater on the right, suspicious for acute pneumonia. 5. Mild anasarca and evidence of generalized volume overload. 6. Evidence of previous cholecystectomy. 7. Fatty infiltration of the liver. Signed by Dr Ngozi Trujillo on 2/21/2020 10:26 PM    Ct Head Wo Contrast    Result Date: 2/21/2020  EXAMINATION: CT HEAD WO CONTRAST 2/21/2020 7:40 PM HISTORY: Altered mental status, acute encephalopathy. DOSE: 826 mGycm. Automatic exposure control was utilized in an effort to use as little radiation as possible, without compromising image quality. REPORT: Axial CT of the head was performed without contrast, reconstructed sagittal and coronal images are also reviewed. COMPARISON: CT head without contrast 12/28/2014. No intracranial hemorrhage, mass or mass effect is identified. There is moderate diffuse age compatible cortical atrophy with mild associated ventriculomegaly as before. Small chronic lacunar infarcts are noted within the basal ganglia, greater on the right.  Review of bone windows printed text.  The electronic translation of spoken language may permit erroneous, or at times, nonsensical words or phrases to be inadvertently transcribed; although attempts have made to review the note for such errors, some may still exist.

## 2020-02-24 NOTE — PROGRESS NOTES
Palliative Care/Spiritual Care: Met with pt and sons to initiate Palliative Care. Pt's son says pt was dehydrated and had some pneumonia. Advance Directives: Pt says he has a POA and  LW, gave a copy to hospital.         Pain/other symptoms: Pt was not having pain. Social/Spiritual: Pt attended [facility]. Pt/family discussion r/t goals: Pt says he has three sons, grandchildren, and great-grandchildren. Pt currently resides at [facility] in [place]. Pt says he ambulates in a wheelchair and that staff uses a life to transport him to a wheelchair. Pt says most days he watches television. Pt's goal is to return to [facility]. Provided spiritual care with sustaining presence, nurtured hope, and prayer. Pt expressed gratitude for spiritual care.     Electronically signed by Susie Ventura on 2/24/2020 at 10:25 AM

## 2020-02-25 LAB
ALBUMIN SERPL-MCNC: 2.8 G/DL (ref 3.5–5.2)
ALP BLD-CCNC: 91 U/L (ref 40–130)
ALT SERPL-CCNC: 15 U/L (ref 5–41)
ANION GAP SERPL CALCULATED.3IONS-SCNC: 12 MMOL/L (ref 7–19)
APTT: 47.7 SEC (ref 26–36.2)
APTT: 54.5 SEC (ref 26–36.2)
APTT: 55.4 SEC (ref 26–36.2)
APTT: 65.2 SEC (ref 26–36.2)
AST SERPL-CCNC: 22 U/L (ref 5–40)
BILIRUB SERPL-MCNC: 0.4 MG/DL (ref 0.2–1.2)
BUN BLDV-MCNC: 19 MG/DL (ref 8–23)
CALCIUM SERPL-MCNC: 8.1 MG/DL (ref 8.2–9.6)
CHLORIDE BLD-SCNC: 112 MMOL/L (ref 98–111)
CO2: 22 MMOL/L (ref 22–29)
CREAT SERPL-MCNC: 1.4 MG/DL (ref 0.5–1.2)
GFR NON-AFRICAN AMERICAN: 47
GLUCOSE BLD-MCNC: 111 MG/DL (ref 74–109)
HCT VFR BLD CALC: 32.2 % (ref 42–52)
HEMOGLOBIN: 10.3 G/DL (ref 14–18)
INR BLD: 1.23 (ref 0.88–1.18)
MCH RBC QN AUTO: 31.8 PG (ref 27–31)
MCHC RBC AUTO-ENTMCNC: 32 G/DL (ref 33–37)
MCV RBC AUTO: 99.4 FL (ref 80–94)
PDW BLD-RTO: 13.9 % (ref 11.5–14.5)
PLATELET # BLD: 183 K/UL (ref 130–400)
PMV BLD AUTO: 9.8 FL (ref 9.4–12.4)
POTASSIUM SERPL-SCNC: 3.6 MMOL/L (ref 3.5–5)
PROTHROMBIN TIME: 15.5 SEC (ref 12–14.6)
RBC # BLD: 3.24 M/UL (ref 4.7–6.1)
SODIUM BLD-SCNC: 146 MMOL/L (ref 136–145)
TOTAL PROTEIN: 4.7 G/DL (ref 6.6–8.7)
WBC # BLD: 5.9 K/UL (ref 4.8–10.8)

## 2020-02-25 PROCEDURE — 6360000002 HC RX W HCPCS: Performed by: INTERNAL MEDICINE

## 2020-02-25 PROCEDURE — 6370000000 HC RX 637 (ALT 250 FOR IP): Performed by: FAMILY MEDICINE

## 2020-02-25 PROCEDURE — 2700000000 HC OXYGEN THERAPY PER DAY

## 2020-02-25 PROCEDURE — 2580000003 HC RX 258: Performed by: INTERNAL MEDICINE

## 2020-02-25 PROCEDURE — 6370000000 HC RX 637 (ALT 250 FOR IP): Performed by: INTERNAL MEDICINE

## 2020-02-25 PROCEDURE — 85027 COMPLETE CBC AUTOMATED: CPT

## 2020-02-25 PROCEDURE — 94640 AIRWAY INHALATION TREATMENT: CPT

## 2020-02-25 PROCEDURE — 80053 COMPREHEN METABOLIC PANEL: CPT

## 2020-02-25 PROCEDURE — 85730 THROMBOPLASTIN TIME PARTIAL: CPT

## 2020-02-25 PROCEDURE — 1210000000 HC MED SURG R&B

## 2020-02-25 PROCEDURE — 51798 US URINE CAPACITY MEASURE: CPT

## 2020-02-25 PROCEDURE — 36415 COLL VENOUS BLD VENIPUNCTURE: CPT

## 2020-02-25 PROCEDURE — 85610 PROTHROMBIN TIME: CPT

## 2020-02-25 RX ORDER — WARFARIN SODIUM 5 MG/1
10 TABLET ORAL DAILY
Status: DISCONTINUED | OUTPATIENT
Start: 2020-02-25 | End: 2020-02-25

## 2020-02-25 RX ORDER — WARFARIN SODIUM 5 MG/1
5 TABLET ORAL
Status: COMPLETED | OUTPATIENT
Start: 2020-02-25 | End: 2020-02-25

## 2020-02-25 RX ADMIN — METRONIDAZOLE 500 MG: 500 TABLET ORAL at 21:33

## 2020-02-25 RX ADMIN — MICONAZOLE NITRATE: 20 POWDER TOPICAL at 21:33

## 2020-02-25 RX ADMIN — METRONIDAZOLE 500 MG: 500 TABLET ORAL at 16:44

## 2020-02-25 RX ADMIN — TAMSULOSIN HYDROCHLORIDE 0.4 MG: 0.4 CAPSULE ORAL at 21:33

## 2020-02-25 RX ADMIN — TAMSULOSIN HYDROCHLORIDE 0.4 MG: 0.4 CAPSULE ORAL at 08:38

## 2020-02-25 RX ADMIN — IPRATROPIUM BROMIDE AND ALBUTEROL SULFATE 1 AMPULE: .5; 3 SOLUTION RESPIRATORY (INHALATION) at 07:05

## 2020-02-25 RX ADMIN — DOXYCYCLINE HYCLATE 100 MG: 100 CAPSULE ORAL at 08:42

## 2020-02-25 RX ADMIN — IPRATROPIUM BROMIDE AND ALBUTEROL SULFATE 1 AMPULE: .5; 3 SOLUTION RESPIRATORY (INHALATION) at 15:08

## 2020-02-25 RX ADMIN — MICONAZOLE NITRATE: 20 POWDER TOPICAL at 08:38

## 2020-02-25 RX ADMIN — Medication 10 ML: at 08:38

## 2020-02-25 RX ADMIN — METRONIDAZOLE 500 MG: 500 TABLET ORAL at 06:49

## 2020-02-25 RX ADMIN — HEPARIN SODIUM 9 UNITS/KG/HR: 10000 INJECTION, SOLUTION INTRAVENOUS at 14:01

## 2020-02-25 RX ADMIN — IPRATROPIUM BROMIDE AND ALBUTEROL SULFATE 1 AMPULE: .5; 3 SOLUTION RESPIRATORY (INHALATION) at 11:29

## 2020-02-25 RX ADMIN — IPRATROPIUM BROMIDE AND ALBUTEROL SULFATE 1 AMPULE: .5; 3 SOLUTION RESPIRATORY (INHALATION) at 19:05

## 2020-02-25 RX ADMIN — FINASTERIDE 5 MG: 5 TABLET, FILM COATED ORAL at 08:38

## 2020-02-25 RX ADMIN — DOXYCYCLINE HYCLATE 100 MG: 100 CAPSULE ORAL at 20:07

## 2020-02-25 RX ADMIN — Medication 10 ML: at 21:33

## 2020-02-25 RX ADMIN — WARFARIN SODIUM 5 MG: 5 TABLET ORAL at 20:06

## 2020-02-25 ASSESSMENT — ENCOUNTER SYMPTOMS
BACK PAIN: 0
ABDOMINAL DISTENTION: 1
CONSTIPATION: 0
VOMITING: 0
SHORTNESS OF BREATH: 0
ABDOMINAL PAIN: 0
DIARRHEA: 0
NAUSEA: 0

## 2020-02-25 ASSESSMENT — PAIN SCALES - GENERAL: PAINLEVEL_OUTOF10: 0

## 2020-02-25 NOTE — CARE COORDINATION
Pt is a bedhold at [facility].   Ivette Carmona  [phone] p  [phone] f  Electronically signed by LEVON Young on 2/25/2020 at 3:32 PM

## 2020-02-25 NOTE — PLAN OF CARE
Problem: Risk for Impaired Skin Integrity  Goal: Tissue integrity - skin and mucous membranes  Description  Structural intactness and normal physiological function of skin and  mucous membranes.   Outcome: Ongoing     Problem: Falls - Risk of:  Goal: Will remain free from falls  Description  Will remain free from falls  Outcome: Ongoing  Goal: Absence of physical injury  Description  Absence of physical injury  Outcome: Ongoing     Problem: Bleeding:  Goal: Will show no signs and symptoms of excessive bleeding  Description  Will show no signs and symptoms of excessive bleeding  Outcome: Ongoing     Problem: Venous Thromboembolism:  Goal: Will show no signs or symptoms of venous thromboembolism  Description  Will show no signs or symptoms of venous thromboembolism  Outcome: Ongoing  Goal: Absence of signs or symptoms of impaired coagulation  Description  Absence of signs or symptoms of impaired coagulation  Outcome: Ongoing     Problem: Discharge Planning:  Goal: Discharged to appropriate level of care  Description  Discharged to appropriate level of care  Outcome: Ongoing  Goal: Participates in care planning  Description  Participates in care planning  Outcome: Ongoing     Problem: Airway Clearance - Ineffective:  Goal: Clear lung sounds  Description  Clear lung sounds  Outcome: Ongoing  Goal: Ability to maintain a clear airway will improve  Description  Ability to maintain a clear airway will improve  Outcome: Ongoing     Problem: Fluid Volume - Deficit:  Goal: Achieves intake and output within specified parameters  Description  Achieves intake and output within specified parameters  Outcome: Ongoing     Problem: Gas Exchange - Impaired:  Goal: Levels of oxygenation will improve  Description  Levels of oxygenation will improve  Outcome: Ongoing     Problem: Hyperthermia:  Goal: Ability to maintain a body temperature in the normal range will improve  Description  Ability to maintain a body temperature in the normal improve  Outcome: Ongoing  Goal: Will remain free from infection  Description  Will remain free from infection  Outcome: Ongoing  Goal: Ability to maintain vital signs within normal range will improve  Description  Ability to maintain vital signs within normal range will improve  Outcome: Ongoing     Problem: Safety:  Goal: Ability to appropriately use an adaptive device for ambulation will improve  Description  Ability to appropriately use an adaptive device for ambulation will improve  Outcome: Ongoing  Goal: Ability to safely and independently change position in bed will improve  Description  Ability to safely and independently change position in bed will improve  Outcome: Ongoing  Goal: Ability to safely transfer will improve  Description  Ability to safely transfer will improve  Outcome: Ongoing  Goal: Free from accidental physical injury  Description  Free from accidental physical injury  Outcome: Ongoing  Goal: Free from intentional harm  Description  Free from intentional harm  Outcome: Ongoing     Problem: ABCDS Injury Assessment  Goal: Absence of physical injury  Outcome: Ongoing     Problem: Nutritional:  Goal: Consumption of the prescribed amount of daily calories will improve  Description  Consumption of the prescribed amount of daily calories will improve  Outcome: Ongoing  Goal: Ability to make healthy dietary choices will improve  Description  Ability to make healthy dietary choices will improve  Outcome: Ongoing  Goal: Progress toward achieving an optimal weight will improve  Description  Progress toward achieving an optimal weight will improve  Outcome: Ongoing  Goal: Nutritional status will improve  Description  Nutritional status will improve  Outcome: Ongoing     Problem: Respiratory:  Goal: Ability to maintain normal respiratory secretions will improve  Description  Ability to maintain normal respiratory secretions will improve  Outcome: Ongoing     Problem: Tissue Perfusion - Peripheral, Altered:  Goal: Electrolytes within specified parameters  Description  Electrolytes within specified parameters  Outcome: Ongoing  Goal: Hemodynamic stability will improve  Description  Hemodynamic stability will improve  Outcome: Ongoing  Goal: Circulatory function within specified parameters  Description  Circulatory function within specified parameters  Outcome: Ongoing     Problem: Infection:  Goal: Will remain free from infection  Description  Will remain free from infection  Outcome: Ongoing     Problem: Daily Care:  Goal: Daily care needs are met  Description  Daily care needs are met  Outcome: Ongoing     Problem: Pain:  Goal: Patient's pain/discomfort is manageable  Description  Patient's pain/discomfort is manageable  Outcome: Ongoing     Problem: Skin Integrity:  Goal: Skin integrity will stabilize  Description  Skin integrity will stabilize  Outcome: Ongoing     Problem: Discharge Planning:  Goal: Patients continuum of care needs are met  Description  Patients continuum of care needs are met  Outcome: Ongoing

## 2020-02-25 NOTE — PROGRESS NOTES
02/23/20  0117   PHOS 2.5     HgbA1C: No results for input(s): LABA1C in the last 72 hours. Hepatic:   Recent Labs     02/23/20  0117 02/24/20  0914 02/25/20  0511   ALKPHOS 79 87 91   ALT 15 16 15   AST 24 24 22   PROT 4.8* 5.3* 4.7*   BILITOT 0.5 0.4 0.4   LABALBU 2.3* 2.9* 2.8*     Lactic Acid:   No results for input(s): LACTA in the last 72 hours. Troponin: No results for input(s): CKTOTAL, CKMB, TROPONINT in the last 72 hours. ABGs: No results for input(s): PH, PCO2, PO2, HCO3, O2SAT in the last 72 hours. CRP:    No results for input(s): CRP in the last 72 hours. Sed Rate:    No results for input(s): SEDRATE in the last 72 hours. Cultures:   No results for input(s): CULTURE in the last 72 hours. No results for input(s): BC, Tl Estrin in the last 72 hours. No results for input(s): CXSURG in the last 72 hours. Radiology reports as per the Radiologist  Radiology: Ct Abdomen Pelvis Wo Contrast Additional Contrast? Oral    Result Date: 2/21/2020  EXAMINATION: CT ABDOMEN PELVIS WO CONTRAST 2/21/2020 10:17 PM HISTORY: Abdominal pain, nausea, vomiting and diarrhea. DOSE: 1580 mGycm. Automatic exposure control was utilized in an effort to use as little radiation as possible, without compromising image quality. REPORT: Spiral CT of the abdomen and pelvis was performed without contrast from the lung bases through the pubic symphysis. Reconstructed coronal and sagittal images are also reviewed. COMPARISON: There are no correlative imaging studies for comparison. Review of lung windows demonstrates moderate respiratory motion artifact and extensive infiltrate in the lower lobes greater on the right, compatible with pneumonia. No pneumothorax is identified. The lungs are hyperinflated compatible COPD. There is a vague subpleural nodule in the right middle lobe axial image 3, which measures approximately 4 mm. There is heavy calcified plaque in the coronary arteries, greatest at the LAD.  There is ectasia of the

## 2020-02-26 ENCOUNTER — TELEPHONE (OUTPATIENT)
Dept: UROLOGY | Age: 85
End: 2020-02-26

## 2020-02-26 VITALS
RESPIRATION RATE: 18 BRPM | TEMPERATURE: 96.5 F | DIASTOLIC BLOOD PRESSURE: 70 MMHG | HEIGHT: 70 IN | BODY MASS INDEX: 34.09 KG/M2 | OXYGEN SATURATION: 100 % | SYSTOLIC BLOOD PRESSURE: 127 MMHG | HEART RATE: 75 BPM | WEIGHT: 238.1 LBS

## 2020-02-26 PROBLEM — N47.1 PHIMOSIS OF PENIS: Status: ACTIVE | Noted: 2020-02-26

## 2020-02-26 LAB
ALBUMIN SERPL-MCNC: 2.5 G/DL (ref 3.5–5.2)
ALP BLD-CCNC: 81 U/L (ref 40–130)
ALT SERPL-CCNC: 13 U/L (ref 5–41)
ANION GAP SERPL CALCULATED.3IONS-SCNC: 9 MMOL/L (ref 7–19)
APTT: 67.4 SEC (ref 26–36.2)
APTT: 75.4 SEC (ref 26–36.2)
AST SERPL-CCNC: 19 U/L (ref 5–40)
BILIRUB SERPL-MCNC: 0.3 MG/DL (ref 0.2–1.2)
BLOOD CULTURE, ROUTINE: NORMAL
BUN BLDV-MCNC: 17 MG/DL (ref 8–23)
CALCIUM SERPL-MCNC: 8 MG/DL (ref 8.2–9.6)
CHLORIDE BLD-SCNC: 106 MMOL/L (ref 98–111)
CO2: 23 MMOL/L (ref 22–29)
CREAT SERPL-MCNC: 1.4 MG/DL (ref 0.5–1.2)
GFR NON-AFRICAN AMERICAN: 47
GLUCOSE BLD-MCNC: 92 MG/DL (ref 74–109)
HCT VFR BLD CALC: 32.3 % (ref 42–52)
HEMOGLOBIN: 10.3 G/DL (ref 14–18)
INR BLD: 1.42 (ref 0.88–1.18)
MCH RBC QN AUTO: 31.7 PG (ref 27–31)
MCHC RBC AUTO-ENTMCNC: 31.9 G/DL (ref 33–37)
MCV RBC AUTO: 99.4 FL (ref 80–94)
PDW BLD-RTO: 14.1 % (ref 11.5–14.5)
PLATELET # BLD: 179 K/UL (ref 130–400)
PMV BLD AUTO: 9.7 FL (ref 9.4–12.4)
POTASSIUM SERPL-SCNC: 3.5 MMOL/L (ref 3.5–5)
PROTHROMBIN TIME: 17.4 SEC (ref 12–14.6)
RBC # BLD: 3.25 M/UL (ref 4.7–6.1)
SODIUM BLD-SCNC: 138 MMOL/L (ref 136–145)
TOTAL PROTEIN: 4.9 G/DL (ref 6.6–8.7)
WBC # BLD: 6.3 K/UL (ref 4.8–10.8)

## 2020-02-26 PROCEDURE — 85730 THROMBOPLASTIN TIME PARTIAL: CPT

## 2020-02-26 PROCEDURE — 80053 COMPREHEN METABOLIC PANEL: CPT

## 2020-02-26 PROCEDURE — 94640 AIRWAY INHALATION TREATMENT: CPT

## 2020-02-26 PROCEDURE — 6370000000 HC RX 637 (ALT 250 FOR IP): Performed by: INTERNAL MEDICINE

## 2020-02-26 PROCEDURE — 2700000000 HC OXYGEN THERAPY PER DAY

## 2020-02-26 PROCEDURE — 85027 COMPLETE CBC AUTOMATED: CPT

## 2020-02-26 PROCEDURE — 36415 COLL VENOUS BLD VENIPUNCTURE: CPT

## 2020-02-26 PROCEDURE — 6370000000 HC RX 637 (ALT 250 FOR IP): Performed by: FAMILY MEDICINE

## 2020-02-26 PROCEDURE — 85610 PROTHROMBIN TIME: CPT

## 2020-02-26 RX ORDER — FINASTERIDE 5 MG/1
5 TABLET, FILM COATED ORAL DAILY
Qty: 30 TABLET | Refills: 3 | Status: SHIPPED | OUTPATIENT
Start: 2020-02-27

## 2020-02-26 RX ORDER — WARFARIN SODIUM 7.5 MG/1
7.5 TABLET ORAL DAILY
Qty: 30 TABLET | Refills: 3 | Status: ON HOLD | OUTPATIENT
Start: 2020-02-26 | End: 2020-03-11 | Stop reason: HOSPADM

## 2020-02-26 RX ORDER — DOXYCYCLINE HYCLATE 100 MG/1
100 CAPSULE ORAL EVERY 12 HOURS SCHEDULED
Qty: 14 CAPSULE | Refills: 0 | Status: SHIPPED | OUTPATIENT
Start: 2020-02-26 | End: 2020-03-04

## 2020-02-26 RX ORDER — TAMSULOSIN HYDROCHLORIDE 0.4 MG/1
0.4 CAPSULE ORAL 2 TIMES DAILY
Qty: 30 CAPSULE | Refills: 3 | Status: SHIPPED | OUTPATIENT
Start: 2020-02-26

## 2020-02-26 RX ORDER — METRONIDAZOLE 500 MG/1
500 TABLET ORAL EVERY 8 HOURS SCHEDULED
Qty: 15 TABLET | Refills: 0 | Status: SHIPPED | OUTPATIENT
Start: 2020-02-26 | End: 2020-03-02

## 2020-02-26 RX ADMIN — DOXYCYCLINE HYCLATE 100 MG: 100 CAPSULE ORAL at 08:52

## 2020-02-26 RX ADMIN — METRONIDAZOLE 500 MG: 500 TABLET ORAL at 05:41

## 2020-02-26 RX ADMIN — IPRATROPIUM BROMIDE AND ALBUTEROL SULFATE 1 AMPULE: .5; 3 SOLUTION RESPIRATORY (INHALATION) at 10:58

## 2020-02-26 RX ADMIN — MICONAZOLE NITRATE: 20 POWDER TOPICAL at 08:52

## 2020-02-26 RX ADMIN — IPRATROPIUM BROMIDE AND ALBUTEROL SULFATE 1 AMPULE: .5; 3 SOLUTION RESPIRATORY (INHALATION) at 06:58

## 2020-02-26 RX ADMIN — FINASTERIDE 5 MG: 5 TABLET, FILM COATED ORAL at 08:52

## 2020-02-26 RX ADMIN — TAMSULOSIN HYDROCHLORIDE 0.4 MG: 0.4 CAPSULE ORAL at 08:52

## 2020-02-26 ASSESSMENT — PAIN SCALES - GENERAL: PAINLEVEL_OUTOF10: 0

## 2020-02-26 NOTE — PROGRESS NOTES
Clinical Pharmacy Note    Warfarin consult follow-up    Recent Labs     02/26/20  0350   INR 1.42*     Recent Labs     02/24/20  0914 02/25/20  0511 02/26/20  0350   HGB 10.9* 10.3* 10.3*   HCT 32.8* 32.2* 32.3*    183 179       Significant Drug-Drug Interactions:  Current warfarin drug-drug interactions: Heparin (bridge) / metronidazole / tamsulosin  Discontinued drug-drug interactions: None    Date INR Warfarin Dose   02/25/20 1.23 5 mg   02/26/20  1.42 7.5 mg                                               Notes:  Give Coumadin 7.5 mg po x 1 tonight. Daily PT/INR until stable within therapeutic range.      Electronically signed by Pieter Jha, [facility] on 2/26/2020 at 8:30 AM

## 2020-02-26 NOTE — DISCHARGE SUMMARY
CARE EVAL  IP CONSULT TO PHARMACY    Time Spent on Discharge:  45 minutes were spent in patient examination, evaluation, counseling as well as medication reconciliation, prescriptions for required medications, discharge plan and follow up.       Surgeries/Procedures Performed:       Treatments:   IV hydration, antibiotics: Cefepime, doxycycline, meropenem, Flagyl anticoagulation: heparin and     Significant Diagnostic Studies:   Recent Labs:  CBC:   Lab Results   Component Value Date    WBC 6.3 02/26/2020    RBC 3.25 02/26/2020    HGB 10.3 02/26/2020    HCT 32.3 02/26/2020    MCV 99.4 02/26/2020    MCH 31.7 02/26/2020    MCHC 31.9 02/26/2020    RDW 14.1 02/26/2020     02/26/2020     BMP:    Lab Results   Component Value Date    GLUCOSE 92 02/26/2020     02/26/2020    K 3.5 02/26/2020    K 3.7 02/22/2020     02/26/2020    CO2 23 02/26/2020    ANIONGAP 9 02/26/2020    BUN 17 02/26/2020    CREATININE 1.4 02/26/2020    CALCIUM 8.0 02/26/2020    LABGLOM 47 02/26/2020     HFP:    Lab Results   Component Value Date    PROT 4.9 02/26/2020     CMP:    Lab Results   Component Value Date    GLUCOSE 92 02/26/2020     02/26/2020    K 3.5 02/26/2020    K 3.7 02/22/2020     02/26/2020    CO2 23 02/26/2020    BUN 17 02/26/2020    CREATININE 1.4 02/26/2020    ANIONGAP 9 02/26/2020    ALKPHOS 81 02/26/2020    ALT 13 02/26/2020    AST 19 02/26/2020    BILITOT 0.3 02/26/2020    LABALBU 2.5 02/26/2020    LABGLOM 47 02/26/2020    PROT 4.9 02/26/2020    CALCIUM 8.0 02/26/2020     PT/INR:    Lab Results   Component Value Date    PROTIME 17.4 02/26/2020    INR 1.42 02/26/2020     PTT:   Lab Results   Component Value Date    APTT 67.4 02/26/2020     FLP:    Lab Results   Component Value Date    CHOL 116 02/25/2015    TRIG 107 02/25/2015    HDL 34 02/25/2015     U/A:    Lab Results   Component Value Date    COLORU YELLOW 02/21/2020    SPECGRAV 1.012 02/21/2020    PHUR 5.5 02/21/2020    PROTEINU Negative BUMEX     calcium carbonate 500 MG chewable tablet  Commonly known as:  TUMS     carboxymethylcellulose 1 % ophthalmic solution     cetirizine 10 MG tablet  Commonly known as:  ZYRTEC     clopidogrel 75 MG tablet  Commonly known as:  PLAVIX     dilTIAZem 180 MG extended release capsule  Commonly known as:  CARDIZEM CD     diphenoxylate-atropine 2.5-0.025 MG per tablet  Commonly known as:  LOMOTIL     escitalopram 10 MG tablet  Commonly known as:  LEXAPRO     fluticasone 50 MCG/ACT nasal spray  Commonly known as:  FLONASE     loperamide 2 MG capsule  Commonly known as:  IMODIUM     loratadine 10 MG tablet  Commonly known as:  CLARITIN     losartan 50 MG tablet  Commonly known as:  COZAAR     Magnesium Chloride-Calcium  MG     Melatonin 10 MG Tabs     MiraLax powder  Generic drug:  polyethylene glycol     nitroGLYCERIN 0.4 MG SL tablet  Commonly known as:  NITROSTAT     pantoprazole 20 MG tablet  Commonly known as:  PROTONIX     pravastatin 20 MG tablet  Commonly known as:  PRAVACHOL     tiotropium 1.25 MCG/ACT Aers inhaler  Commonly known as:  SPIRIVA RESPIMAT     Tylenol PM Extra Strength  MG tablet  Generic drug:  diphenhydrAMINE-APAP (sleep)     vitamin B-12 1000 MCG tablet  Commonly known as:  CYANOCOBALAMIN     vitamin C 500 MG tablet  Commonly known as:  ASCORBIC ACID     Vitamin D3 50 MCG (2000 UT) Caps        STOP taking these medications    aspirin 81 MG chewable tablet           Where to Get Your Medications      You can get these medications from any pharmacy    Bring a paper prescription for each of these medications  · doxycycline hyclate 100 MG capsule  · enoxaparin 100 MG/ML injection  · finasteride 5 MG tablet  · metroNIDAZOLE 500 MG tablet  · tamsulosin 0.4 MG capsule  · warfarin 7.5 MG tablet         Electronically signed by   Keila Javed   Internal Medicine Hospitalist  On 2/26/2020  At 10:27 AM    EMR Dragon/Transcription disclaimer:   Much of this encounter note is an electronic

## 2020-02-26 NOTE — TELEPHONE ENCOUNTER
Zacarias Diver requests that  return their call. The best time to reach him is Anytime. Pt getting discharged and need to [facility] FU for intermittent urinary retention and aquired phimosis. [facility] unable to sched due to no time frame given. Please contact [phone] to sched. Thank you.

## 2020-02-27 LAB
CULTURE, BLOOD 2: ABNORMAL
ORGANISM: ABNORMAL

## 2020-03-03 ENCOUNTER — LAB REQUISITION (OUTPATIENT)
Dept: LAB | Facility: HOSPITAL | Age: 85
End: 2020-03-03

## 2020-03-03 DIAGNOSIS — Z00.00 ROUTINE GENERAL MEDICAL EXAMINATION AT A HEALTH CARE FACILITY: ICD-10-CM

## 2020-03-03 LAB
BACTERIA UR QL AUTO: ABNORMAL /HPF
BILIRUB UR QL STRIP: NEGATIVE
CLARITY UR: CLEAR
COLOR UR: YELLOW
GLUCOSE UR STRIP-MCNC: NEGATIVE MG/DL
HGB UR QL STRIP.AUTO: ABNORMAL
HYALINE CASTS UR QL AUTO: ABNORMAL /LPF
KETONES UR QL STRIP: NEGATIVE
LEUKOCYTE ESTERASE UR QL STRIP.AUTO: ABNORMAL
NITRITE UR QL STRIP: NEGATIVE
PH UR STRIP.AUTO: 6 [PH] (ref 5–8)
PROT UR QL STRIP: ABNORMAL
RBC # UR: ABNORMAL /HPF
REF LAB TEST METHOD: ABNORMAL
SP GR UR STRIP: 1.01 (ref 1–1.03)
SQUAMOUS #/AREA URNS HPF: ABNORMAL /HPF
UROBILINOGEN UR QL STRIP: ABNORMAL
WBC UR QL AUTO: ABNORMAL /HPF

## 2020-03-03 PROCEDURE — 87086 URINE CULTURE/COLONY COUNT: CPT

## 2020-03-03 PROCEDURE — 81001 URINALYSIS AUTO W/SCOPE: CPT

## 2020-03-04 LAB — BACTERIA SPEC AEROBE CULT: NORMAL

## 2020-03-07 ENCOUNTER — APPOINTMENT (OUTPATIENT)
Dept: GENERAL RADIOLOGY | Age: 85
DRG: 871 | End: 2020-03-07
Payer: MEDICARE

## 2020-03-07 ENCOUNTER — LAB REQUISITION (OUTPATIENT)
Dept: LAB | Facility: HOSPITAL | Age: 85
End: 2020-03-07

## 2020-03-07 ENCOUNTER — HOSPITAL ENCOUNTER (INPATIENT)
Age: 85
LOS: 4 days | Discharge: NURSING, REHAB OR LONG-TERM CARE | DRG: 871 | End: 2020-03-11
Attending: EMERGENCY MEDICINE | Admitting: HOSPITALIST
Payer: MEDICARE

## 2020-03-07 DIAGNOSIS — Z00.00 ROUTINE GENERAL MEDICAL EXAMINATION AT A HEALTH CARE FACILITY: ICD-10-CM

## 2020-03-07 PROBLEM — A41.9 SEPSIS (HCC): Status: ACTIVE | Noted: 2020-03-07

## 2020-03-07 LAB
ALBUMIN SERPL-MCNC: 2.8 G/DL (ref 3.5–5.2)
ALP BLD-CCNC: 93 U/L (ref 40–130)
ALT SERPL-CCNC: 10 U/L (ref 5–41)
ANION GAP SERPL CALCULATED.3IONS-SCNC: 15 MMOL/L (ref 7–19)
APTT: 28.3 SEC (ref 26–36.2)
AST SERPL-CCNC: 20 U/L (ref 5–40)
BACTERIA UR QL AUTO: ABNORMAL /HPF
BASE EXCESS ARTERIAL: 4.2 MMOL/L (ref -2–2)
BASOPHILS ABSOLUTE: 0 K/UL (ref 0–0.2)
BASOPHILS RELATIVE PERCENT: 0.1 % (ref 0–1)
BILIRUB SERPL-MCNC: 1.3 MG/DL (ref 0.2–1.2)
BILIRUB UR QL STRIP: NEGATIVE
BUN BLDV-MCNC: 25 MG/DL (ref 8–23)
CALCIUM SERPL-MCNC: 8.3 MG/DL (ref 8.2–9.6)
CARBOXYHEMOGLOBIN ARTERIAL: 2.4 % (ref 0–5)
CHLORIDE BLD-SCNC: 99 MMOL/L (ref 98–111)
CLARITY UR: ABNORMAL
CO2: 22 MMOL/L (ref 22–29)
COLOR UR: YELLOW
CREAT SERPL-MCNC: 1.5 MG/DL (ref 0.5–1.2)
EOSINOPHILS ABSOLUTE: 0 K/UL (ref 0–0.6)
EOSINOPHILS RELATIVE PERCENT: 0 % (ref 0–5)
GFR NON-AFRICAN AMERICAN: 44
GLUCOSE BLD-MCNC: 129 MG/DL (ref 74–109)
GLUCOSE UR STRIP-MCNC: NEGATIVE MG/DL
HCO3 ARTERIAL: 27.1 MMOL/L (ref 22–26)
HCT VFR BLD CALC: 36.8 % (ref 42–52)
HEMOGLOBIN, ART, EXTENDED: 12.7 G/DL (ref 14–18)
HEMOGLOBIN: 12.2 G/DL (ref 14–18)
HGB UR QL STRIP.AUTO: ABNORMAL
HYALINE CASTS UR QL AUTO: ABNORMAL /LPF
IMMATURE GRANULOCYTES #: 0 K/UL
INR BLD: 1.64 (ref 0.88–1.18)
KETONES UR QL STRIP: NEGATIVE
LACTIC ACID, SEPSIS: 2.3 MG/DL (ref 0.5–1.9)
LACTIC ACID, SEPSIS: 3.8 MG/DL (ref 0.5–1.9)
LEUKOCYTE ESTERASE UR QL STRIP.AUTO: ABNORMAL
LYMPHOCYTES ABSOLUTE: 0.4 K/UL (ref 1.1–4.5)
LYMPHOCYTES RELATIVE PERCENT: 4.6 % (ref 20–40)
MCH RBC QN AUTO: 31.6 PG (ref 27–31)
MCHC RBC AUTO-ENTMCNC: 33.2 G/DL (ref 33–37)
MCV RBC AUTO: 95.3 FL (ref 80–94)
METHEMOGLOBIN ARTERIAL: 1 %
MONOCYTES ABSOLUTE: 0.5 K/UL (ref 0–0.9)
MONOCYTES RELATIVE PERCENT: 5.9 % (ref 0–10)
NEUTROPHILS ABSOLUTE: 7.6 K/UL (ref 1.5–7.5)
NEUTROPHILS RELATIVE PERCENT: 89.3 % (ref 50–65)
NITRITE UR QL STRIP: POSITIVE
O2 CONTENT ARTERIAL: 15.8 ML/DL
O2 SAT, ARTERIAL: 88.5 %
O2 THERAPY: ABNORMAL
PCO2 ARTERIAL: 34 MMHG (ref 35–45)
PDW BLD-RTO: 14.5 % (ref 11.5–14.5)
PH ARTERIAL: 7.51 (ref 7.35–7.45)
PH UR STRIP.AUTO: 7 [PH] (ref 5–8)
PLATELET # BLD: 301 K/UL (ref 130–400)
PMV BLD AUTO: 9.6 FL (ref 9.4–12.4)
PO2 ARTERIAL: 49 MMHG (ref 80–100)
POTASSIUM REFLEX MAGNESIUM: 4.1 MMOL/L (ref 3.5–5)
POTASSIUM, WHOLE BLOOD: 3.5
PRO-BNP: 1727 PG/ML (ref 0–1800)
PROT UR QL STRIP: ABNORMAL
PROTHROMBIN TIME: 19.6 SEC (ref 12–14.6)
RAPID INFLUENZA  B AGN: NEGATIVE
RAPID INFLUENZA A AGN: NEGATIVE
RBC # BLD: 3.86 M/UL (ref 4.7–6.1)
RBC # UR: ABNORMAL /HPF
REASON FOR REJECTION: NORMAL
REF LAB TEST METHOD: ABNORMAL
REJECTED TEST: NORMAL
SODIUM BLD-SCNC: 136 MMOL/L (ref 136–145)
SP GR UR STRIP: 1.01 (ref 1–1.03)
SQUAMOUS #/AREA URNS HPF: ABNORMAL /HPF
TOTAL PROTEIN: 5.8 G/DL (ref 6.6–8.7)
UROBILINOGEN UR QL STRIP: ABNORMAL
WBC # BLD: 8.5 K/UL (ref 4.8–10.8)
WBC UR QL AUTO: ABNORMAL /HPF

## 2020-03-07 PROCEDURE — 6370000000 HC RX 637 (ALT 250 FOR IP): Performed by: EMERGENCY MEDICINE

## 2020-03-07 PROCEDURE — 71045 X-RAY EXAM CHEST 1 VIEW: CPT

## 2020-03-07 PROCEDURE — 87077 CULTURE AEROBIC IDENTIFY: CPT

## 2020-03-07 PROCEDURE — 36556 INSERT NON-TUNNEL CV CATH: CPT

## 2020-03-07 PROCEDURE — 87086 URINE CULTURE/COLONY COUNT: CPT

## 2020-03-07 PROCEDURE — 87804 INFLUENZA ASSAY W/OPTIC: CPT

## 2020-03-07 PROCEDURE — 36600 WITHDRAWAL OF ARTERIAL BLOOD: CPT

## 2020-03-07 PROCEDURE — 87186 SC STD MICRODIL/AGAR DIL: CPT

## 2020-03-07 PROCEDURE — 96365 THER/PROPH/DIAG IV INF INIT: CPT

## 2020-03-07 PROCEDURE — 83605 ASSAY OF LACTIC ACID: CPT

## 2020-03-07 PROCEDURE — 85610 PROTHROMBIN TIME: CPT

## 2020-03-07 PROCEDURE — 87040 BLOOD CULTURE FOR BACTERIA: CPT

## 2020-03-07 PROCEDURE — 6360000002 HC RX W HCPCS: Performed by: EMERGENCY MEDICINE

## 2020-03-07 PROCEDURE — 99291 CRITICAL CARE FIRST HOUR: CPT

## 2020-03-07 PROCEDURE — 85730 THROMBOPLASTIN TIME PARTIAL: CPT

## 2020-03-07 PROCEDURE — 81001 URINALYSIS AUTO W/SCOPE: CPT

## 2020-03-07 PROCEDURE — 84132 ASSAY OF SERUM POTASSIUM: CPT

## 2020-03-07 PROCEDURE — 82803 BLOOD GASES ANY COMBINATION: CPT

## 2020-03-07 PROCEDURE — 83880 ASSAY OF NATRIURETIC PEPTIDE: CPT

## 2020-03-07 PROCEDURE — 80053 COMPREHEN METABOLIC PANEL: CPT

## 2020-03-07 PROCEDURE — 2580000003 HC RX 258: Performed by: EMERGENCY MEDICINE

## 2020-03-07 PROCEDURE — 36415 COLL VENOUS BLD VENIPUNCTURE: CPT

## 2020-03-07 PROCEDURE — 96368 THER/DIAG CONCURRENT INF: CPT

## 2020-03-07 PROCEDURE — 96375 TX/PRO/DX INJ NEW DRUG ADDON: CPT

## 2020-03-07 PROCEDURE — 93005 ELECTROCARDIOGRAM TRACING: CPT | Performed by: EMERGENCY MEDICINE

## 2020-03-07 PROCEDURE — 99999 PR OFFICE/OUTPT VISIT,PROCEDURE ONLY: CPT | Performed by: EMERGENCY MEDICINE

## 2020-03-07 PROCEDURE — 2000000000 HC ICU R&B

## 2020-03-07 PROCEDURE — 85025 COMPLETE CBC W/AUTO DIFF WBC: CPT

## 2020-03-07 PROCEDURE — 2500000003 HC RX 250 WO HCPCS: Performed by: EMERGENCY MEDICINE

## 2020-03-07 RX ORDER — CEFEPIME HYDROCHLORIDE 2 G/50ML
2 INJECTION, SOLUTION INTRAVENOUS EVERY 12 HOURS
Status: DISCONTINUED | OUTPATIENT
Start: 2020-03-07 | End: 2020-03-07 | Stop reason: SDUPTHER

## 2020-03-07 RX ORDER — IBUPROFEN 200 MG
400 TABLET ORAL ONCE
Status: COMPLETED | OUTPATIENT
Start: 2020-03-07 | End: 2020-03-07

## 2020-03-07 RX ORDER — SODIUM CHLORIDE, SODIUM LACTATE, POTASSIUM CHLORIDE, CALCIUM CHLORIDE 600; 310; 30; 20 MG/100ML; MG/100ML; MG/100ML; MG/100ML
1000 INJECTION, SOLUTION INTRAVENOUS ONCE
Status: COMPLETED | OUTPATIENT
Start: 2020-03-07 | End: 2020-03-07

## 2020-03-07 RX ADMIN — Medication 1000 MG: at 20:35

## 2020-03-07 RX ADMIN — Medication 500 MG: at 20:35

## 2020-03-07 RX ADMIN — SODIUM CHLORIDE, POTASSIUM CHLORIDE, SODIUM LACTATE AND CALCIUM CHLORIDE 1000 ML: 600; 310; 30; 20 INJECTION, SOLUTION INTRAVENOUS at 19:10

## 2020-03-07 RX ADMIN — Medication 2 MCG/MIN: at 20:35

## 2020-03-07 RX ADMIN — IBUPROFEN 400 MG: 200 TABLET, FILM COATED ORAL at 19:15

## 2020-03-07 RX ADMIN — SODIUM CHLORIDE, POTASSIUM CHLORIDE, SODIUM LACTATE AND CALCIUM CHLORIDE 1000 ML: 600; 310; 30; 20 INJECTION, SOLUTION INTRAVENOUS at 22:18

## 2020-03-07 RX ADMIN — CEFEPIME HYDROCHLORIDE 2 G: 2 INJECTION, POWDER, FOR SOLUTION INTRAVENOUS at 20:45

## 2020-03-07 ASSESSMENT — ENCOUNTER SYMPTOMS
EYE PAIN: 0
VOICE CHANGE: 0
DIARRHEA: 0
RHINORRHEA: 0
ABDOMINAL PAIN: 0
SHORTNESS OF BREATH: 0
NAUSEA: 1
EYE REDNESS: 0
COUGH: 1
VOMITING: 1

## 2020-03-07 ASSESSMENT — PAIN SCALES - GENERAL: PAINLEVEL_OUTOF10: 5

## 2020-03-07 NOTE — LETTER
included under [facility]. A Clinical Episode is a grouping of medical conditions or diagnoses that are included in the [address] [facility].

## 2020-03-08 ENCOUNTER — APPOINTMENT (OUTPATIENT)
Dept: GENERAL RADIOLOGY | Age: 85
DRG: 871 | End: 2020-03-08
Payer: MEDICARE

## 2020-03-08 PROBLEM — R65.20 SEVERE SEPSIS (HCC): Status: ACTIVE | Noted: 2020-03-07

## 2020-03-08 PROBLEM — J18.9 PNEUMONIA: Status: ACTIVE | Noted: 2020-03-08

## 2020-03-08 LAB
BACTERIA: ABNORMAL /HPF
BILIRUBIN URINE: NEGATIVE
BLOOD, URINE: ABNORMAL
CLARITY: ABNORMAL
COLOR: YELLOW
GLUCOSE URINE: NEGATIVE MG/DL
KETONES, URINE: NEGATIVE MG/DL
LACTIC ACID, SEPSIS: 1.8 MG/DL (ref 0.5–1.9)
LACTIC ACID, SEPSIS: 2.9 MG/DL (ref 0.5–1.9)
LEUKOCYTE ESTERASE, URINE: ABNORMAL
NITRITE, URINE: NEGATIVE
PH UA: 8.5 (ref 5–8)
PROTEIN UA: >=300 MG/DL
RBC UA: ABNORMAL /HPF (ref 0–2)
SPECIFIC GRAVITY UA: 1.02 (ref 1–1.03)
UROBILINOGEN, URINE: 0.2 E.U./DL
WBC UA: ABNORMAL /HPF (ref 0–5)

## 2020-03-08 PROCEDURE — 6370000000 HC RX 637 (ALT 250 FOR IP): Performed by: HOSPITALIST

## 2020-03-08 PROCEDURE — 2000000000 HC ICU R&B

## 2020-03-08 PROCEDURE — 2500000003 HC RX 250 WO HCPCS: Performed by: EMERGENCY MEDICINE

## 2020-03-08 PROCEDURE — 36592 COLLECT BLOOD FROM PICC: CPT

## 2020-03-08 PROCEDURE — 87632 RESP VIRUS 6-11 TARGETS: CPT

## 2020-03-08 PROCEDURE — 81001 URINALYSIS AUTO W/SCOPE: CPT

## 2020-03-08 PROCEDURE — 02HV33Z INSERTION OF INFUSION DEVICE INTO SUPERIOR VENA CAVA, PERCUTANEOUS APPROACH: ICD-10-PCS | Performed by: EMERGENCY MEDICINE

## 2020-03-08 PROCEDURE — 83605 ASSAY OF LACTIC ACID: CPT

## 2020-03-08 PROCEDURE — 51702 INSERT TEMP BLADDER CATH: CPT

## 2020-03-08 PROCEDURE — 6360000002 HC RX W HCPCS: Performed by: HOSPITALIST

## 2020-03-08 PROCEDURE — 71045 X-RAY EXAM CHEST 1 VIEW: CPT

## 2020-03-08 PROCEDURE — 2700000000 HC OXYGEN THERAPY PER DAY

## 2020-03-08 PROCEDURE — 51798 US URINE CAPACITY MEASURE: CPT

## 2020-03-08 PROCEDURE — 2580000003 HC RX 258: Performed by: EMERGENCY MEDICINE

## 2020-03-08 PROCEDURE — 36415 COLL VENOUS BLD VENIPUNCTURE: CPT

## 2020-03-08 PROCEDURE — 6370000000 HC RX 637 (ALT 250 FOR IP): Performed by: INTERNAL MEDICINE

## 2020-03-08 PROCEDURE — 2580000003 HC RX 258: Performed by: HOSPITALIST

## 2020-03-08 PROCEDURE — 87081 CULTURE SCREEN ONLY: CPT

## 2020-03-08 RX ORDER — UREA 10 %
1 LOTION (ML) TOPICAL NIGHTLY PRN
Status: DISCONTINUED | OUTPATIENT
Start: 2020-03-08 | End: 2020-03-11 | Stop reason: HOSPADM

## 2020-03-08 RX ORDER — PRAVASTATIN SODIUM 20 MG
20 TABLET ORAL NIGHTLY
Status: DISCONTINUED | OUTPATIENT
Start: 2020-03-08 | End: 2020-03-08

## 2020-03-08 RX ORDER — PHENOL 1.4 %
10 AEROSOL, SPRAY (ML) MUCOUS MEMBRANE NIGHTLY PRN
Status: DISCONTINUED | OUTPATIENT
Start: 2020-03-08 | End: 2020-03-08 | Stop reason: SDUPTHER

## 2020-03-08 RX ORDER — LANOLIN ALCOHOL/MO/W.PET/CERES
9 CREAM (GRAM) TOPICAL NIGHTLY PRN
Status: DISCONTINUED | OUTPATIENT
Start: 2020-03-08 | End: 2020-03-11 | Stop reason: HOSPADM

## 2020-03-08 RX ORDER — ASCORBIC ACID 500 MG
500 TABLET ORAL 2 TIMES DAILY
Status: DISCONTINUED | OUTPATIENT
Start: 2020-03-08 | End: 2020-03-08

## 2020-03-08 RX ORDER — SODIUM CHLORIDE 0.9 % (FLUSH) 0.9 %
10 SYRINGE (ML) INJECTION EVERY 12 HOURS SCHEDULED
Status: DISCONTINUED | OUTPATIENT
Start: 2020-03-08 | End: 2020-03-11 | Stop reason: HOSPADM

## 2020-03-08 RX ORDER — FINASTERIDE 5 MG/1
5 TABLET, FILM COATED ORAL DAILY
Status: DISCONTINUED | OUTPATIENT
Start: 2020-03-08 | End: 2020-03-11 | Stop reason: HOSPADM

## 2020-03-08 RX ORDER — TAMSULOSIN HYDROCHLORIDE 0.4 MG/1
0.4 CAPSULE ORAL 2 TIMES DAILY
Status: DISCONTINUED | OUTPATIENT
Start: 2020-03-08 | End: 2020-03-11 | Stop reason: HOSPADM

## 2020-03-08 RX ORDER — SODIUM CHLORIDE 0.9 % (FLUSH) 0.9 %
10 SYRINGE (ML) INJECTION PRN
Status: DISCONTINUED | OUTPATIENT
Start: 2020-03-08 | End: 2020-03-11 | Stop reason: HOSPADM

## 2020-03-08 RX ORDER — DILTIAZEM HYDROCHLORIDE 180 MG/1
180 CAPSULE, COATED, EXTENDED RELEASE ORAL DAILY
Status: DISCONTINUED | OUTPATIENT
Start: 2020-03-08 | End: 2020-03-08

## 2020-03-08 RX ORDER — CLOPIDOGREL BISULFATE 75 MG/1
75 TABLET ORAL DAILY
Status: DISCONTINUED | OUTPATIENT
Start: 2020-03-08 | End: 2020-03-11 | Stop reason: HOSPADM

## 2020-03-08 RX ORDER — SODIUM CHLORIDE 9 MG/ML
INJECTION, SOLUTION INTRAVENOUS CONTINUOUS
Status: DISCONTINUED | OUTPATIENT
Start: 2020-03-08 | End: 2020-03-09

## 2020-03-08 RX ADMIN — MELATONIN 9 MG: at 19:58

## 2020-03-08 RX ADMIN — PIPERACILLIN SODIUM AND TAZOBACTAM SODIUM 3.38 G: 3; .375 INJECTION, POWDER, LYOPHILIZED, FOR SOLUTION INTRAVENOUS at 03:52

## 2020-03-08 RX ADMIN — SODIUM CHLORIDE, PRESERVATIVE FREE 10 ML: 5 INJECTION INTRAVENOUS at 08:25

## 2020-03-08 RX ADMIN — Medication 1 MG: at 19:58

## 2020-03-08 RX ADMIN — SODIUM CHLORIDE: 9 INJECTION, SOLUTION INTRAVENOUS at 03:22

## 2020-03-08 RX ADMIN — SODIUM CHLORIDE, PRESERVATIVE FREE 10 ML: 5 INJECTION INTRAVENOUS at 19:57

## 2020-03-08 RX ADMIN — FINASTERIDE 5 MG: 5 TABLET, FILM COATED ORAL at 08:57

## 2020-03-08 RX ADMIN — TAMSULOSIN HYDROCHLORIDE 0.4 MG: 0.4 CAPSULE ORAL at 08:57

## 2020-03-08 RX ADMIN — CEFEPIME HYDROCHLORIDE 2 G: 2 INJECTION, POWDER, FOR SOLUTION INTRAVENOUS at 19:56

## 2020-03-08 RX ADMIN — TAMSULOSIN HYDROCHLORIDE 0.4 MG: 0.4 CAPSULE ORAL at 19:56

## 2020-03-08 RX ADMIN — VASOPRESSIN 0.04 UNITS/MIN: 20 INJECTION INTRAVENOUS at 00:40

## 2020-03-08 RX ADMIN — CEFEPIME HYDROCHLORIDE 2 G: 2 INJECTION, POWDER, FOR SOLUTION INTRAVENOUS at 08:25

## 2020-03-08 RX ADMIN — CLOPIDOGREL BISULFATE 75 MG: 75 TABLET ORAL at 08:57

## 2020-03-08 RX ADMIN — VANCOMYCIN HYDROCHLORIDE 1250 MG: 10 INJECTION, POWDER, LYOPHILIZED, FOR SOLUTION INTRAVENOUS at 14:15

## 2020-03-08 ASSESSMENT — ENCOUNTER SYMPTOMS
VOMITING: 0
CONSTIPATION: 0
COUGH: 1
SHORTNESS OF BREATH: 0
BACK PAIN: 0
DIARRHEA: 0
NAUSEA: 0

## 2020-03-08 ASSESSMENT — PAIN SCALES - GENERAL
PAINLEVEL_OUTOF10: 0

## 2020-03-08 NOTE — H&P
[facility] Medicine  History and Physical    Patient:  Jalen Harding  MRN: [id number]    CHIEF COMPLAINT:  fever    History Obtained From: Patient  Family present for interview:     PCP: No primary care provider on file. HISTORY OF PRESENT ILLNESS:  80 y.o. male who presents with fever, cough, fatigue. In the emergency department chest x-ray showed left lower lobe pneumonia. His vitals were as follows: Temperature 101.4 °F, respiratory rate 38/min, pulse 115 bpm, blood pressure 77/46 mmHg. Patient denies abdominal pain or chest pain. Patient  is admitted to the intensive care unit for further evaluation and treatment of sepsis and left lower lobe pneumonia. REVIEW OF SYSTEMS:  Review of Systems    All other 14 review of systems negative except as noted above. 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  Authorizing Provider   warfarin (COUMADIN) 7.5 MG tablet Take 1 tablet by mouth daily 2/26/20   Kirkwood Gosselin, MD   finasteride (PROSCAR) 5 MG tablet Take 1 tablet by mouth daily 2/27/20   Kirkwood Gosselin, MD   tamsulosin [facility]) 0.4 MG capsule Take 1 capsule by mouth 2 times daily 2/26/20   Kirkwood Gosselin, MD   bumetanide (BUMEX) 1 MG tablet Take 1 mg by mouth daily as needed As needed for edema and related SOA    Historical Provider, MD   fluticasone (FLONASE) 50 MCG/ACT nasal spray 2 sprays by Each Nostril route nightly as needed for Rhinitis    Historical (TYLENOL PM EXTRA STRENGTH)  MG tablet Take 1 tablet by mouth nightly as needed for Sleep    Historical Provider, MD   pravastatin (PRAVACHOL) 20 MG tablet 20 mg nightly  10/14/11   Historical Provider, MD   diltiazem (CARDIZEM CD) 180 MG ER capsule 180 mg daily  8/24/11   Historical Provider, MD   Ascorbic Acid (VITAMIN C) 500 MG tablet Take 500 mg by mouth 2 times daily     Historical Provider, MD       Allergies:  Ketek [telithromycin]    Social History:   TOBACCO:   reports that he has never smoked. He has never used smokeless tobacco.  ETOH:   reports no history of alcohol use. Social History     Substance and Sexual Activity   Drug Use No       Family History:       Problem Relation Age of Onset    Diabetes Mother     Heart Disease Mother     Heart Attack Mother     Other Mother         cerebral hemorrhage    Diabetes Father     Other Other         all 5 siblings have diabetes    Other Sister         rheumatic fever           Physical Exam:    Vitals: BP (!) 95/43   Pulse 86   Temp 99.1 °F (37.3 °C) (Oral)   Resp 17   Ht 5' 10\" (1.778 m)   Wt 238 lb (108 kg)   SpO2 91%   BMI 34.15 kg/m²   24HR INTAKE/OUTPUT:      Intake/Output Summary (Last 24 hours) at 3/8/2020 0103  Last data filed at 3/7/2020 2318  Gross per 24 hour   Intake 2250 ml   Output --   Net 2250 ml       General: Alert and cooperative with exam. Normal body habitus. Appears  stated age and unhealthy. HEENT: Atraumatic normocephalic. Respiratory: Inspection of chest normal.  No use of accessory muscles, normal diaphragmatic movements. Diminished breath sounds without wheezes in all lung fields. Cardiac: Regular rate and rhythm, S1, S2 normal, no murmur, click, no gallop or rubs. No reproducible chest pain on palpation of the chest wall. Abdomen: Soft, non-tender; no peritoneal signs, bowel sounds normal; no masses,  no organomegaly. Musculoskeletal: Normal joints on inspection.   No clubbing, no cyanosis or 49.0*   YNB5KPT 27.1*   BEART 4.2*   HGBAE 12.7*   [id number] 88.5*   CARBOXHGBART 2.4   02THERAPY Unknown         -----------------------------------------------------------------    Imaging Studies:    XR CHEST PORTABLE   Final Result   Left Perihilar/lingular airspace opacities, acute   infectious/inflammatory change, pneumonia considered. Signed by Dr Juancho Santillan on 3/7/2020 7:34 PM      XR CHEST PORTABLE    (Results Pending)         Assessment     Active Problems:    Severe sepsis ([address]    Pneumonia  Resolved Problems:    * No resolved hospital problems. *      Plan     --Admit to the  intensive care unit for treatment of severe sepsis. Patient started on IV fluid boluses as per protocol for severe sepsis. Start levo fed to maintain map above 65. Start broad-spectrum antibiotic coverage with vancomycin and Zosyn. Check respiratory viral PCR, sputum culture blood culture influenza. Influenza A and B rapid test was negative. -- DVT prophylaxis:  SQ Lovenox      -- CODE STATUS: DNR     Total face-to-face time with this patient, time spent reviewing medical records and in coordination of care with the emergency department physician, nursing staff was 55 minute of critical care times.     Signed:  Claude Rico MD 3/8/2020 1:03 AM  Admitting Hospitalist

## 2020-03-08 NOTE — PROGRESS NOTES
Hospitalist Progress Note    Patient:  Rosana Noguera  YOB: 1925  Date of Service: 3/8/2020  MRN: [id number]   Acct: [de-identified]   Primary Care Physician: No primary care provider on file. Advance Directive: Prior  Admit Date: 3/7/2020       Hospital Day: 1  Referring Provider: Wendy Lauren DO    Patient Seen, Chart, Consults, Notes, Labs, Radiology studies reviewed. Subjective:  Rosana Noguera is a 80 y.o. male  whom we are following for community-acquired pneumonia, sepsis, septic shock. He is much better this morning. Blood pressure is improved. He is still on vasopressin and Levophed. Heart rate is also improved. He denies any complaints at present. His urine output is somewhat low. During his last admission, he was having problems with emptying his bladder.     Allergies:  Ketek [telithromycin]    Medicines:  Current Facility-Administered Medications   Medication Dose Route Frequency Provider Last Rate Last Dose    clopidogrel (PLAVIX) tablet 75 mg  75 mg Oral Daily Verna Hernandez MD        0.9 % sodium chloride infusion   Intravenous Continuous Verna Hernandez  mL/hr at 03/08/20 0322      melatonin tablet 9 mg  9 mg Oral Nightly PRN Verna Hernandez MD        And    melatonin tablet 1 mg  1 mg Oral Nightly PRN Verna Hernandez MD        vancomycin Beryle Mura) intermittent dosing (placeholder)   Other RX Placeholder Verna Hernandez MD        vancomycin (VANCOCIN) 1,250 mg in dextrose 5 % 250 mL IVPB  1,250 mg Intravenous Q24H Verna Hernandez MD        sodium chloride flush 0.9 % injection 10 mL  10 mL Intravenous 2 times per day Verna Hernandez MD        sodium chloride flush 0.9 % injection 10 mL  10 mL Intravenous PRN Verna Hernandez MD        tamsulosin (FLOMAX) capsule 0.4 mg  0.4 mg Oral BID Wendy Xin, DO        finasteride (PROSCAR) tablet 5 mg  5 mg Oral Daily [place] Xin, DO        norepinephrine (LEVOPHED) 16 mg in dextrose 5% 250 mL infusion  2 mcg/min Intravenous Continuous Katie Huddleston MD 5.6 mL/hr at 03/07/20 2245 6 mcg/min at 03/07/20 2245    ceFEPIme (MAXIPIME) 2 g in sterile water 20 mL IV syringe  2 g Intravenous Q12H Sosa Eubanks MD   2 g at 03/07/20 2045       Past Medical History:  Past Medical History:   Diagnosis Date    Cancer [facility])     prostate    GERD (gastroesophageal reflux disease)     Hyperlipidemia     Hypertension     Palliative care patient 02/24/2020    TIA (transient ischemic attack)        Past Surgical History:  Past Surgical History:   Procedure Laterality Date    ANKLE FUSION      APPENDECTOMY      CHOLECYSTECTOMY      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    KNEE ARTHROPLASTY      bilateral       Family History  Family History   Problem Relation Age of Onset    Diabetes Mother     Heart Disease Mother     Heart Attack Mother     Other Mother         cerebral hemorrhage    Diabetes Father     Other Other         all 5 siblings have diabetes    Other Sister         rheumatic fever       Social History  Social History     Socioeconomic History    Marital status:      Spouse name: Not on file    Number of children: Not on file    Years of education: Not on file    Highest education level: Not on file   Occupational History    Not on file   Social Needs    Financial resource strain: Not on file    Food insecurity     Worry: Not on file     Inability: Not on file    Transportation needs     Medical: Not on file     Non-medical: Not on file   Tobacco Use    Smoking status: Never Smoker    Smokeless tobacco: Never Used   Substance and Sexual Activity    Alcohol use: No    Drug use: No    Sexual activity: Not on file   Lifestyle    Physical activity     Days per week: Not on file     Minutes per session: Not on file    Stress: Not on file   Relationships    Social connections     Talks on phone: Not on file     Gets together: Not on file Skin:     General: Skin is warm and dry. Neurological:      Mental Status: He is alert and oriented to person, place, and time. Labs:  BMP:   Recent Labs     03/07/20 1837 03/07/20  1848     --    K 4.1 3.5   CL 99  --    CO2 22  --    BUN 25*  --    CREATININE 1.5*  --    CALCIUM 8.3  --      CBC:   Recent Labs     03/07/20  1906   WBC 8.5   HGB 12.2*   HCT 36.8*   MCV 95.3*        LIVER PROFILE:   Recent Labs     03/07/20 1837   AST 20   ALT 10   BILITOT 1.3*   ALKPHOS 93     PT/INR:   Recent Labs     03/07/20 1837   PROTIME 19.6*   INR 1.64*     APTT:   Recent Labs     03/07/20 1837   APTT 28.3     BNP:  No results for input(s): BNP in the last 72 hours. Ionized Calcium:No results for input(s): IONCA in the last 72 hours. Magnesium:No results for input(s): MG in the last 72 hours. Phosphorus:No results for input(s): PHOS in the last 72 hours. HgbA1C: No results for input(s): LABA1C in the last 72 hours. Hepatic:   Recent Labs     03/07/20 1837   ALKPHOS 93   ALT 10   AST 20   PROT 5.8*   BILITOT 1.3*   LABALBU 2.8*     Lactic Acid: No results for input(s): LACTA in the last 72 hours. Troponin: No results for input(s): CKTOTAL, CKMB, TROPONINT in the last 72 hours. ABGs: No results for input(s): PH, PCO2, PO2, HCO3, O2SAT in the last 72 hours. CRP:  No results for input(s): CRP in the last 72 hours. Sed Rate:  No results for input(s): SEDRATE in the last 72 hours. Cultures:   No results for input(s): CULTURE in the last 72 hours. No results for input(s): BC, [place] in the last 72 hours. No results for input(s): CXSURG in the last 72 hours. Radiology reports as per the Radiologist  Radiology: Xr Chest Portable    Result Date: 3/8/2020  EXAMINATION: XR CHEST PORTABLE 3/8/2020 7:01 AM HISTORY: XR CHEST PORTABLE 3/7/2020 11:30 PM HISTORY: Right central catheter COMPARISON: March 7, 2020. FINDINGS: Right lung is clear.  Airspace filling infiltrate in the left lung is noted consistent with pneumonia. . The cardiac silhouette is mildly enlarged. Right internal jugular catheter is satisfactorily positioned. . The osseous structures and surrounding soft tissues demonstrate no acute abnormality. 1. Persistent infiltrate consistent with pneumonia left lung. . Signed by Dr Siomara Orantes on 3/8/2020 7:03 AM    Xr Chest Portable    Result Date: 3/7/2020  XR CHEST PORTABLE 3/7/2020 5:45 PM HISTORY:   Sepsis  Single view. COMPARISONS:  2/21/2020 and 12/24/2014 FINDINGS: There are airspace opacities involving the left perihilar, lingula and lower lobe region, acute infectious/inflammatory change, pneumonia considered. Mild right lower lobe/middle lobe interstitial prominence noted. Correlate with patient presentation. Follow-up recommended. The heart is generous, point circulation appropriate, without heart failure. No acute osseous abdomen is identified. Left Perihilar/lingular airspace opacities, acute infectious/inflammatory change, pneumonia considered. Signed by Dr Anastasia Campos on 3/7/2020 7:34 PM       Assessment     Severe sepsis. Continue pressor support. Fluid resuscitation. Pneumonia. Continue broad-spectrum antibiotics.       Arlie Fleischer, DO

## 2020-03-08 NOTE — ED NOTES
Respiratory and Dr. Herman Lazar at bedside. Respiratory obtaining ABG.      Garth Glynn, RN  03/07/20 9042

## 2020-03-08 NOTE — PROGRESS NOTES
3/7/2020  6:52 PM - Josef Kyin Incoming Lab Results From [facility]     Component Value Ref Range & Units Status Collected Lab   pH, Arterial 7.510High Panic   7.350 - 7.450 Final 03/07/2020  6:48 PM [facility] Lab   pCO2, Arterial 34. 0Low   35.0 - 45.0 mmHg Final 03/07/2020  6:48 PM [address] [place] [address] Lab   pO2, Arterial 49. 0Low Panic   80.0 - 100.0 mmHg Final 03/07/2020  6:48 PM [address] [place] [address] Lab   HCO3, Arterial 27. 1High   22.0 - 26.0 mmol/L Final 03/07/2020  6:48 PM [facility] Excess, Arterial 4.2High   -2.0 - 2.0 mmol/L Final 03/07/2020  6:48 PM [address] [place] [address] Lab   Hemoglobin, Art, Extended 12.7Low   14.0 - 18.0 g/dL Final 03/07/2020  6:48 PM [address] [place] [address] Lab   O2 Sat, Arterial 88.5Low   >92 % Final 03/07/2020  6:48 PM [facility] Lab   Carboxyhgb, Arterial 2.4  0.0 - 5.0 % Final 03/07/2020  6:48 PM [facility] Lab        0.0-1.5   (Smokers 1.5-5.0)    Methemoglobin, Arterial 1.0  <1.5 % Final 03/07/2020  6:48 PM [facility] Lab   O2 Content, Arterial 15.8  Not Established mL/dL Final 03/07/2020  6:48 PM [address] [place] [address] Lab     Pt on 4 lpm nasal cannula  resp rate 30  Right radial puncture AT+

## 2020-03-08 NOTE — ED PROVIDER NOTES
50 mg by mouth daily    MAGNESIUM CHLORIDE-CALCIUM  MG    Take 1 tablet by mouth nightly    MELATONIN 10 MG TABS    Take 10 mg by mouth nightly as needed    NITROGLYCERIN (NITROSTAT) 0.4 MG SL TABLET    Place 0.4 mg under the tongue every 5 minutes as needed for Chest pain up to max of 3 total doses. If no relief after 1 dose, call 911.     PANTOPRAZOLE (PROTONIX) 20 MG TABLET    Take 20 mg by mouth daily    POLYETHYLENE GLYCOL (MIRALAX) POWDER    Take 17 g by mouth daily as needed (constipation)    PRAVASTATIN (PRAVACHOL) 20 MG TABLET    20 mg nightly     TAMSULOSIN (FLOMAX) 0.4 MG CAPSULE    Take 1 capsule by mouth 2 times daily    TIOTROPIUM (SPIRIVA RESPIMAT) 1.25 MCG/ACT AERS INHALER    Inhale 1 puff into the lungs daily    VITAMIN B-12 (CYANOCOBALAMIN) 1000 MCG TABLET    Take 1,000 mcg by mouth daily    WARFARIN (COUMADIN) 7.5 MG TABLET    Take 1 tablet by mouth daily       ALLERGIES     Ketek [telithromycin]    FAMILY HISTORY       Family History   Problem Relation Age of Onset    Diabetes Mother     Heart Disease Mother     Heart Attack Mother     Other Mother         cerebral hemorrhage    Diabetes Father     Other Other         all 5 siblings have diabetes    Other Sister         rheumatic fever          SOCIAL HISTORY       Social History     Socioeconomic History    Marital status:      Spouse name: None    Number of children: None    Years of education: None    Highest education level: None   Occupational History    None   Social Needs    Financial resource strain: None    Food insecurity     Worry: None     Inability: None    Transportation needs     Medical: None     Non-medical: None   Tobacco Use    Smoking status: Never Smoker    Smokeless tobacco: Never Used   Substance and Sexual Activity    Alcohol use: No    Drug use: No    Sexual activity: None   Lifestyle    Physical activity     Days per week: None     Minutes per session: None    Stress: None ibuprofen (ADVIL;MOTRIN) tablet 400 mg (400 mg Oral Given 3/7/20 1915)   vancomycin (VANCOCIN) 1 g in dextrose 5% 250 mL IVPB (0 mg Intravenous Stopped 3/7/20 2135)   azithromycin (ZITHROMAX) 500 mg in dextrose 5% 250 mL IVPB (0 mg Intravenous Stopped 3/7/20 2135)   lactated ringers infusion 1,000 mL (0 mLs Intravenous Stopped 3/7/20 2318)       EMERGENCY DEPARTMENT COURSE and DIFFERENTIALDIAGNOSIS/MDM:   Vitals:    Vitals:    03/07/20 2316 03/08/20 0002 03/08/20 0023 03/08/20 0031   BP: (!) 93/44 (!) 93/48 (!) 103/46 (!) 95/43   Pulse: 87 85 86 86   Resp: 20 20 21 17   Temp:       TempSrc:       SpO2: 92% 91% 92% 91%   Weight:       Height:           MDM  Number of Diagnoses or Management Options  Sepsis due to pneumonia [facility]):   Septic shock [facility]):               CRITICAL CARE TIME   Total Critical Care time was >74 minutes, excluding separately reportable procedures. There was a high probability of clinically significant/life threatening deterioration in the patient's condition which required my urgent intervention. Based on the evaluation and work-up here patient is felt to require further monitoring, work-up, or treatment that is available in the emergency department. Case was discussed with hospitalist who agrees for observation or admission for further management. Treatment and stabilization as necessary were provided in the emergency department prior to transfer of care to the ICU service.            CONSULTS:  IP CONSULT TO PHARMACY    PROCEDURES:  Unless otherwise notedbelow, none     Central Line  Date/Time: 3/8/2020 12:34 AM  Performed by: Sam Guerrero MD  Authorized by: Sam Guerrero MD     Consent:     Consent obtained:  Verbal    Consent given by:  Patient    Risks discussed:  Incorrect placement, arterial puncture, bleeding, infection, nerve damage and pneumothorax    Alternatives discussed:  No treatment and alternative treatment  Pre-procedure details:     Hand hygiene: Hand MD  03/08/20 6972

## 2020-03-08 NOTE — PROGRESS NOTES
4 Eyes Skin Assessment    Minnie Guardado is being assessed upon: Admission    I agree that I, Alejandro Dill, along with Adolfo Crane  have performed a thorough Head to Toe Skin Assessment on the patient. ALL assessment sites listed below have been assessed. Areas assessed by both nurses:     [x]   Head, Face, and Ears   [x]   Shoulders, Back, and Chest  [x]   Arms, Elbows, and Hands   [x]   Coccyx, Sacrum, and Ischium  [x]   Legs, Feet, and Heels    Does the Patient have Skin Breakdown? Yes : 1.5 x I cm ulcer to left lower leg  Discoloration to BLE.  Redness to sacrum, and bruises to arms      Michele Prevention initiated: Yes  Wound Care Orders initiated: Yes    [id number]  nurse consulted for Pressure Injury (Stage 3,4, Unstageable, DTI, NWPT, and Complex wounds) and New or Established Ostomies: Yes        Primary Nurse eSignature: Alejandro Dill, RN on 3/8/2020 at 1:31 AM      Co-Signer eSignature: {Esignature:[id number]}

## 2020-03-09 LAB
BACTERIA SPEC AEROBE CULT: ABNORMAL
BACTERIA SPEC AEROBE CULT: ABNORMAL
MRSA CULTURE ONLY: ABNORMAL
ORGANISM: ABNORMAL

## 2020-03-09 PROCEDURE — 6360000002 HC RX W HCPCS: Performed by: INTERNAL MEDICINE

## 2020-03-09 PROCEDURE — 6370000000 HC RX 637 (ALT 250 FOR IP): Performed by: INTERNAL MEDICINE

## 2020-03-09 PROCEDURE — 2580000003 HC RX 258: Performed by: INTERNAL MEDICINE

## 2020-03-09 PROCEDURE — 2580000003 HC RX 258: Performed by: HOSPITALIST

## 2020-03-09 PROCEDURE — 6370000000 HC RX 637 (ALT 250 FOR IP): Performed by: HOSPITALIST

## 2020-03-09 PROCEDURE — 87186 SC STD MICRODIL/AGAR DIL: CPT

## 2020-03-09 PROCEDURE — 1210000000 HC MED SURG R&B

## 2020-03-09 PROCEDURE — 6360000002 HC RX W HCPCS: Performed by: HOSPITALIST

## 2020-03-09 PROCEDURE — 87070 CULTURE OTHR SPECIMN AEROBIC: CPT

## 2020-03-09 PROCEDURE — 87205 SMEAR GRAM STAIN: CPT

## 2020-03-09 PROCEDURE — 2700000000 HC OXYGEN THERAPY PER DAY

## 2020-03-09 RX ORDER — ACETAMINOPHEN 325 MG/1
650 TABLET ORAL EVERY 6 HOURS PRN
Status: DISCONTINUED | OUTPATIENT
Start: 2020-03-09 | End: 2020-03-11 | Stop reason: HOSPADM

## 2020-03-09 RX ADMIN — FINASTERIDE 5 MG: 5 TABLET, FILM COATED ORAL at 08:38

## 2020-03-09 RX ADMIN — VANCOMYCIN HYDROCHLORIDE 1250 MG: 10 INJECTION, POWDER, LYOPHILIZED, FOR SOLUTION INTRAVENOUS at 15:07

## 2020-03-09 RX ADMIN — TAMSULOSIN HYDROCHLORIDE 0.4 MG: 0.4 CAPSULE ORAL at 08:38

## 2020-03-09 RX ADMIN — ACETAMINOPHEN 650 MG: 325 TABLET ORAL at 20:22

## 2020-03-09 RX ADMIN — SODIUM CHLORIDE, PRESERVATIVE FREE 10 ML: 5 INJECTION INTRAVENOUS at 20:22

## 2020-03-09 RX ADMIN — CEFEPIME HYDROCHLORIDE 2 G: 2 INJECTION, POWDER, FOR SOLUTION INTRAVENOUS at 08:38

## 2020-03-09 RX ADMIN — SODIUM CHLORIDE, PRESERVATIVE FREE 10 ML: 5 INJECTION INTRAVENOUS at 08:39

## 2020-03-09 RX ADMIN — MELATONIN 9 MG: at 20:22

## 2020-03-09 RX ADMIN — TAMSULOSIN HYDROCHLORIDE 0.4 MG: 0.4 CAPSULE ORAL at 20:22

## 2020-03-09 RX ADMIN — CLOPIDOGREL BISULFATE 75 MG: 75 TABLET ORAL at 08:38

## 2020-03-09 RX ADMIN — CEFEPIME HYDROCHLORIDE 2 G: 2 INJECTION, POWDER, FOR SOLUTION INTRAVENOUS at 20:22

## 2020-03-09 ASSESSMENT — ENCOUNTER SYMPTOMS
NAUSEA: 0
CONSTIPATION: 0
VOMITING: 0
SHORTNESS OF BREATH: 0
BACK PAIN: 0
DIARRHEA: 0

## 2020-03-09 ASSESSMENT — PAIN - FUNCTIONAL ASSESSMENT: PAIN_FUNCTIONAL_ASSESSMENT: PREVENTS OR INTERFERES SOME ACTIVE ACTIVITIES AND ADLS

## 2020-03-09 ASSESSMENT — PAIN SCALES - GENERAL
PAINLEVEL_OUTOF10: 0
PAINLEVEL_OUTOF10: 3
PAINLEVEL_OUTOF10: 0
PAINLEVEL_OUTOF10: 0

## 2020-03-09 ASSESSMENT — PAIN DESCRIPTION - LOCATION: LOCATION: SHOULDER

## 2020-03-09 ASSESSMENT — PAIN DESCRIPTION - DESCRIPTORS: DESCRIPTORS: ACHING

## 2020-03-09 ASSESSMENT — PAIN DESCRIPTION - PROGRESSION: CLINICAL_PROGRESSION: NOT CHANGED

## 2020-03-09 ASSESSMENT — PAIN DESCRIPTION - ORIENTATION: ORIENTATION: RIGHT

## 2020-03-09 ASSESSMENT — PAIN DESCRIPTION - FREQUENCY: FREQUENCY: CONTINUOUS

## 2020-03-09 ASSESSMENT — PAIN DESCRIPTION - ONSET: ONSET: ON-GOING

## 2020-03-09 ASSESSMENT — PAIN DESCRIPTION - PAIN TYPE: TYPE: CHRONIC PAIN

## 2020-03-09 NOTE — PROGRESS NOTES
Hospitalist Progress Note    Patient:  Yevgeniy Vidal  YOB: 1925  Date of Service: 3/9/2020  MRN: [id number]   Acct: [de-identified]   Primary Care Physician: No primary care provider on file. Advance Directive: Prior  Admit Date: 3/7/2020       Hospital Day: 2  Referring Provider: Mary Sewell DO    Patient Seen, Chart, Consults, Notes, Labs, Radiology studies reviewed. Subjective:  Yevgeniy Vidal is a 80 y.o. male  whom we are following for community-acquired pneumonia, sepsis, septic shock. He is doing much better this morning. He is off pressors. He looks much brighter. His appetite is excellent. At this point, I feel he is medically stable for transfer out of the ICU. I had an extended discussion with his son. He is concerned about his father's recurrent admissions. I have introduced the concept of hospice. He is currently residing in a nursing home. His is going to discuss with his family about utilizing hospice in the nursing home.     Allergies:  Ketek [telithromycin]    Medicines:  Current Facility-Administered Medications   Medication Dose Route Frequency Provider Last Rate Last Dose    clopidogrel (PLAVIX) tablet 75 mg  75 mg Oral Daily Kameron Ken MD   75 mg at 03/09/20 0838    0.9 % sodium chloride infusion   Intravenous Continuous Katie Huddleston  mL/hr at 03/08/20 0322      melatonin tablet 9 mg  9 mg Oral Nightly PRN Kameron Ken MD   9 mg at 03/08/20 1958    And    melatonin tablet 1 mg  1 mg Oral Nightly PRN Kameron Ken MD   1 mg at 03/08/20 1958    vancomycin (VANCOCIN) intermittent dosing (placeholder)   Other RX Placeholder Kameron Ken MD        vancomycin (VANCOCIN) 1,250 mg in dextrose 5 % 250 mL IVPB  1,250 mg Intravenous Q24H Kameron Ken MD   Stopped at 03/08/20 1545    sodium chloride flush 0.9 % injection 10 mL  10 mL Intravenous 2 times per day Kameron Ken MD   10 mL at 03/09/20 0839    sodium chloride flush 0.9 % injection 10 mL  10 mL Intravenous PRN Jenny Melendez MD        tamsulosin [facility]) capsule 0.4 mg  0.4 mg Oral BID Hawa Poncho, DO   0.4 mg at 03/09/20 6331    finasteride (PROSCAR) tablet 5 mg  5 mg Oral Daily Hawa Poncho, DO   5 mg at 03/09/20 2738    norepinephrine (LEVOPHED) 16 mg in dextrose 5% 250 mL infusion  2 mcg/min Intravenous Continuous Katie Huddleston MD 3.8 mL/hr at 03/08/20 2300 4 mcg/min at 03/08/20 2300    ceFEPIme (MAXIPIME) 2 g in sterile water 20 mL IV syringe  2 g Intravenous Q12H Jenny Melendez MD   2 g at 03/09/20 0545       Past Medical History:  Past Medical History:   Diagnosis Date    Cancer [facility])     prostate    GERD (gastroesophageal reflux disease)     Hyperlipidemia     Hypertension     Palliative care patient 02/24/2020    TIA (transient ischemic attack)        Past Surgical History:  Past Surgical History:   Procedure Laterality Date    ANKLE FUSION      APPENDECTOMY      CHOLECYSTECTOMY      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    KNEE ARTHROPLASTY      bilateral       Family History  Family History   Problem Relation Age of Onset    Diabetes Mother     Heart Disease Mother     Heart Attack Mother     Other Mother         cerebral hemorrhage    Diabetes Father     Other Other         all 5 siblings have diabetes    Other Sister         rheumatic fever       Social History  Social History     Socioeconomic History    Marital status:      Spouse name: Not on file    Number of children: Not on file    Years of education: Not on file    Highest education level: Not on file   Occupational History    Not on file   Social Needs    Financial resource strain: Not on file    Food insecurity     Worry: Not on file     Inability: Not on file    Transportation needs     Medical: Not on file     Non-medical: Not on file   Tobacco Use    Smoking status: Never Smoker    Smokeless tobacco: Never Used Substance and Sexual Activity    Alcohol use: No    Drug use: No    Sexual activity: Not on file   Lifestyle    Physical activity     Days per week: Not on file     Minutes per session: Not on file    Stress: Not on file   Relationships    Social connections     Talks on phone: Not on file     Gets together: Not on file     Attends Confucianist service: Not on file     Active member of club or organization: Not on file     Attends meetings of clubs or organizations: Not on file     Relationship status: Not on file    Intimate partner violence     Fear of current or ex partner: Not on file     Emotionally abused: Not on file     Physically abused: Not on file     Forced sexual activity: Not on file   Other Topics Concern    Not on file   Social History Narrative    Not on file         Review of Systems:    Review of Systems   Constitutional: Negative for activity change and fever. Respiratory: Negative for shortness of breath. Cardiovascular: Negative for chest pain and leg swelling. Gastrointestinal: Negative for constipation, diarrhea, nausea and vomiting. Genitourinary: Negative for difficulty urinating and dysuria. Musculoskeletal: Negative for back pain and neck pain. Neurological: Negative for dizziness and light-headedness. Objective:  Blood pressure (!) 122/49, pulse 102, temperature 98.7 °F (37.1 °C), temperature source Temporal, resp. rate 25, height 5' 10\" (1.778 m), weight 237 lb 1.6 oz (107.5 kg), SpO2 95 %. Intake/Output Summary (Last 24 hours) at 3/9/2020 0929  Last data filed at 3/9/2020 0838  Gross per 24 hour   Intake 2366.6 ml   Output 2265 ml   Net 101.6 ml       Physical Exam  Vitals signs reviewed. Constitutional:       Appearance: He is well-developed. HENT:      Head: Normocephalic and atraumatic. Eyes:      Conjunctiva/sclera: Conjunctivae normal.      Pupils: Pupils are equal, round, and reactive to light.    Cardiovascular:      Rate and Rhythm: Normal

## 2020-03-09 NOTE — PROGRESS NOTES
Minnie Guardado received from ICU to room # 311 . Mental Status: Patient is oriented and alert. Vitals:    03/09/20 1306   BP: (!) 116/59   Pulse: 86   Resp: 16   Temp: 97.9 °F (36.6 °C)   SpO2: 92%     Placed on cardiac monitor: No.  Belongings: Glasses with patient at bedside . Family at bedside No.  Oriented Patient to room. Call light within reach. Yes. Transfer was: Well tolerated by patient. .    Electronically signed by Ina Sandoval RN on 3/9/2020 at 1:26 PM

## 2020-03-09 NOTE — CARE COORDINATION
3/9/20: Pt is a courtesy bedhold at [facility].   Ivette Carmona  [phone] p  [phone] f  Electronically signed by LEVON Young on 3/9/2020 at 1:04 PM
information given. Randi Gaines is agreeable with appropriate follow up after discharge. Patient is resident of [address] [place] [address]. He wears prn oxygen, says more recently continuous, but does not know how many liters. He is hard of hearing, wears hearing aides in both ears. He is dependent for ADL's, meals, medications, finances, and transportation. He says he has a wheelchair he uses at the facility. Will follow as inpatient and upon discharge. Future Appointments   Date Time Provider Zheng Arnold   3/26/2020 10:45 AM Ezekiel Soliz MD [facility]       Health Maintenance  There are no preventive care reminders to display for this patient.     Renetta Keith RN

## 2020-03-09 NOTE — PLAN OF CARE
Nutrition Problem: Increased nutrient needs  Intervention: Food and/or Nutrient Delivery: Continue current diet  Nutritional Goals: Pt will show s/s healing

## 2020-03-09 NOTE — PROGRESS NOTES
Wound Healing, Weight, Pertinent Labs      Electronically signed by Mike Louis MS, RD, LD on 3/9/20 at 3:34 PM CDT    Contact Number: [phone]

## 2020-03-09 NOTE — PROGRESS NOTES
4 Eyes Skin Assessment    Minnie Guardado is being assessed upon: Transfer to New Unit    I agree that Hunter Pickard, along with Olivia Nicole RN (either 2 RN's or 1 LPN and 1 RN) have performed a thorough Head to Toe Skin Assessment on the patient. ALL assessment sites listed below have been assessed. Areas assessed by both nurses:     [x]   Head, Face, and Ears   [x]   Shoulders, Back, and Chest  [x]   Arms, Elbows, and Hands   [x]   Coccyx, Sacrum, and Ischium  [x]   Legs, Feet, and Heels    Does the Patient have Skin Breakdown? Yes, wound(s) noted upon assessment. It is the responsibility of the Primary Nurse to assure that the following documentation, preventions, orders, and consults are complete on the above noted wound(s): Wound LDA initiated. LDA Flowsheet Documentation includes the Nati-wound, Wound Assessment, Measurements, Dressing Treatment, Drainage, and Color.     Michele Prevention initiated: Yes  Wound Care Orders initiated: No    WOC nurse consulted for Pressure Injury (Stage 3,4, Unstageable, DTI, NWPT, and Complex wounds) and New or Established Ostomies: No        Primary Nurse eSignature: Ina Sandoval RN on 3/9/2020 at 1:27 PM      Co-Signer eSignature: Electronically signed by Wally Angel RN on 3/9/20 at 1:41 PM CDT

## 2020-03-09 NOTE — CONSULTS
Palliative Care:     Pt is new to palliative care team.  At present time he is awake, alert and oriented. Pt   is at bedside. Pt is a very pleasant 80 yr old admitted with sepsis/PNA in ICU. Transferred to floor today. Past Medical History:        Past Medical History:   Diagnosis Date    Cancer [facility])     prostate    GERD (gastroesophageal reflux disease)     Hyperlipidemia     Hypertension     Palliative care patient 02/24/2020    TIA (transient ischemic attack)        Advance Directives:    DNR-Cc pt states he would not want \"anything done. Just let me go be with the Lord\". Pain/Other Symptoms:    Pt has c/o pain in his right shoulder. He tells me he uses osteo biflex at . This nurse made pt RN aware and also asked if pt could have Ty;lenol if needed. She will ask MD for orders    Activity:     Pt is total care. 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 Psychological/Spiritual:    Pt has 3 sons. Good family and spiritual support. Plan:    Medical management    Patient/family discussion r/t goals:   Pt talks with me about his life at . Tells me they take very good care of him there. He is unable to get out of his room(by his choice) because he needs so much. He states they use a lift to get him out of bed. He needs assist with all ADL's. There is discussion with MD and pt son about hospice care at facility. Will have HMD review with me for any qualifying dx. Palliative will continue to follow for supportive care. Pt plan is to return to the  as before.                   Electronically signed by Key Barnes RN on 3/9/2020 at 1:59 PM

## 2020-03-09 NOTE — PROGRESS NOTES
Consult received, Palliative Care RN has met with the patient and family to initiate care and complete discussions. I will follow along and assist if needed. Thank you for consulting Palliative Care.

## 2020-03-10 LAB
EKG P AXIS: 2 DEGREES
EKG P-R INTERVAL: 94 MS
EKG Q-T INTERVAL: 298 MS
EKG QRS DURATION: 88 MS
EKG QTC CALCULATION (BAZETT): 387 MS
EKG T AXIS: 110 DEGREES
VANCOMYCIN TROUGH: 7.5 UG/ML (ref 10–20)

## 2020-03-10 PROCEDURE — 2580000003 HC RX 258: Performed by: INTERNAL MEDICINE

## 2020-03-10 PROCEDURE — 6370000000 HC RX 637 (ALT 250 FOR IP): Performed by: INTERNAL MEDICINE

## 2020-03-10 PROCEDURE — 80202 ASSAY OF VANCOMYCIN: CPT

## 2020-03-10 PROCEDURE — 2700000000 HC OXYGEN THERAPY PER DAY

## 2020-03-10 PROCEDURE — 1210000000 HC MED SURG R&B

## 2020-03-10 PROCEDURE — 6370000000 HC RX 637 (ALT 250 FOR IP): Performed by: HOSPITALIST

## 2020-03-10 PROCEDURE — 36415 COLL VENOUS BLD VENIPUNCTURE: CPT

## 2020-03-10 PROCEDURE — 93010 ELECTROCARDIOGRAM REPORT: CPT | Performed by: INTERNAL MEDICINE

## 2020-03-10 PROCEDURE — 6360000002 HC RX W HCPCS: Performed by: INTERNAL MEDICINE

## 2020-03-10 RX ORDER — LINEZOLID 600 MG/1
600 TABLET, FILM COATED ORAL EVERY 12 HOURS SCHEDULED
Status: DISCONTINUED | OUTPATIENT
Start: 2020-03-10 | End: 2020-03-11 | Stop reason: HOSPADM

## 2020-03-10 RX ADMIN — TAMSULOSIN HYDROCHLORIDE 0.4 MG: 0.4 CAPSULE ORAL at 09:00

## 2020-03-10 RX ADMIN — CEFEPIME HYDROCHLORIDE 2 G: 2 INJECTION, POWDER, FOR SOLUTION INTRAVENOUS at 08:59

## 2020-03-10 RX ADMIN — ACETAMINOPHEN 650 MG: 325 TABLET ORAL at 09:35

## 2020-03-10 RX ADMIN — CLOPIDOGREL BISULFATE 75 MG: 75 TABLET ORAL at 09:00

## 2020-03-10 RX ADMIN — FINASTERIDE 5 MG: 5 TABLET, FILM COATED ORAL at 08:59

## 2020-03-10 RX ADMIN — SODIUM CHLORIDE, PRESERVATIVE FREE 10 ML: 5 INJECTION INTRAVENOUS at 09:02

## 2020-03-10 RX ADMIN — ACETAMINOPHEN 650 MG: 325 TABLET ORAL at 19:35

## 2020-03-10 RX ADMIN — TAMSULOSIN HYDROCHLORIDE 0.4 MG: 0.4 CAPSULE ORAL at 21:00

## 2020-03-10 RX ADMIN — LINEZOLID 600 MG: 600 TABLET, FILM COATED ORAL at 21:00

## 2020-03-10 RX ADMIN — VANCOMYCIN HYDROCHLORIDE 1250 MG: 10 INJECTION, POWDER, LYOPHILIZED, FOR SOLUTION INTRAVENOUS at 14:59

## 2020-03-10 RX ADMIN — SODIUM CHLORIDE, PRESERVATIVE FREE 10 ML: 5 INJECTION INTRAVENOUS at 21:00

## 2020-03-10 ASSESSMENT — PAIN DESCRIPTION - PAIN TYPE: TYPE: CHRONIC PAIN

## 2020-03-10 ASSESSMENT — ENCOUNTER SYMPTOMS
NAUSEA: 0
VOMITING: 0
CONSTIPATION: 0
DIARRHEA: 0
SHORTNESS OF BREATH: 0
BACK PAIN: 0

## 2020-03-10 ASSESSMENT — PAIN SCALES - GENERAL
PAINLEVEL_OUTOF10: 3
PAINLEVEL_OUTOF10: 5

## 2020-03-10 ASSESSMENT — PAIN DESCRIPTION - FREQUENCY: FREQUENCY: CONTINUOUS

## 2020-03-10 ASSESSMENT — PAIN DESCRIPTION - DESCRIPTORS: DESCRIPTORS: ACHING;DISCOMFORT

## 2020-03-10 ASSESSMENT — PAIN DESCRIPTION - ONSET: ONSET: ON-GOING

## 2020-03-10 ASSESSMENT — PAIN DESCRIPTION - ORIENTATION: ORIENTATION: RIGHT

## 2020-03-10 ASSESSMENT — PAIN DESCRIPTION - LOCATION: LOCATION: SHOULDER

## 2020-03-10 ASSESSMENT — PAIN DESCRIPTION - PROGRESSION: CLINICAL_PROGRESSION: NOT CHANGED

## 2020-03-10 ASSESSMENT — PAIN - FUNCTIONAL ASSESSMENT: PAIN_FUNCTIONAL_ASSESSMENT: PREVENTS OR INTERFERES SOME ACTIVE ACTIVITIES AND ADLS

## 2020-03-10 NOTE — PROGRESS NOTES
Follow up visit with pt and son. Son gives me more information about pt CVA x 5 yrs ago. He states pt did go to 8th floor rehab x 1 month. Reports pt was able to walk with walker and assistance. Pt was able to go home and follow up at [facility] for op therapy. At home, pt needed someone with him all the time. Even though pt was able to walk with walker and assist, it was only very short distances. Pt did not improve but declined to the point of having to go to NF. Son states pt was needing more care than he and his brother could handle. Pt was followed by Dr. Alayna Singh for f/u visits after stroke. Son is interested in hospice care d/t multiple visits to hospital. He would like to keep pt comfortable in NF. Palliative will follow for support/goals.   Electronically signed by Adonay Parker RN on 3/10/2020 at 2:57 PM

## 2020-03-10 NOTE — PROGRESS NOTES
0.4 mg Oral BID Calleen Osiel, DO   0.4 mg at 03/10/20 0900    finasteride (PROSCAR) tablet 5 mg  5 mg Oral Daily Alayna Paulinor, DO   5 mg at 03/10/20 9986    ceFEPIme (MAXIPIME) 2 g in sterile water 20 mL IV syringe  2 g Intravenous Q12H Callbarb Osiel, DO   2 g at 03/10/20 3006       Past Medical History:  Past Medical History:   Diagnosis Date    Cancer [facility])     prostate    GERD (gastroesophageal reflux disease)     Hyperlipidemia     Hypertension     Palliative care patient 02/24/2020    TIA (transient ischemic attack)        Past Surgical History:  Past Surgical History:   Procedure Laterality Date    ANKLE FUSION      APPENDECTOMY      CHOLECYSTECTOMY      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    KNEE ARTHROPLASTY      bilateral       Family History  Family History   Problem Relation Age of Onset    Diabetes Mother     Heart Disease Mother     Heart Attack Mother     Other Mother         cerebral hemorrhage    Diabetes Father     Other Other         all 5 siblings have diabetes    Other Sister         rheumatic fever       Social History  Social History     Socioeconomic History    Marital status:      Spouse name: Not on file    Number of children: Not on file    Years of education: Not on file    Highest education level: Not on file   Occupational History    Not on file   Social Needs    Financial resource strain: Not on file    Food insecurity     Worry: Not on file     Inability: Not on file    Transportation needs     Medical: Not on file     Non-medical: Not on file   Tobacco Use    Smoking status: Never Smoker    Smokeless tobacco: Never Used   Substance and Sexual Activity    Alcohol use: No    Drug use: No    Sexual activity: Not on file   Lifestyle    Physical activity     Days per week: Not on file     Minutes per session: Not on file    Stress: Not on file   Relationships    Social connections     Talks on phone: Not on file     Gets together: Not on file     Attends Caodaism service: Not on file     Active member of club or organization: Not on file     Attends meetings of clubs or organizations: Not on file     Relationship status: Not on file    Intimate partner violence     Fear of current or ex partner: Not on file     Emotionally abused: Not on file     Physically abused: Not on file     Forced sexual activity: Not on file   Other Topics Concern    Not on file   Social History Narrative    Not on file         Review of Systems:    Review of Systems   Constitutional: Negative for activity change and fever. Respiratory: Negative for shortness of breath. Cardiovascular: Negative for chest pain and leg swelling. Gastrointestinal: Negative for constipation, diarrhea, nausea and vomiting. Genitourinary: Negative for difficulty urinating and dysuria. Musculoskeletal: Negative for back pain and neck pain. Neurological: Positive for weakness. Negative for dizziness and light-headedness. Objective:  Blood pressure 122/72, pulse 71, temperature 96.6 °F (35.9 °C), temperature source Temporal, resp. rate 16, height 5' 11\" (1.803 m), weight 237 lb (107.5 kg), SpO2 93 %. Intake/Output Summary (Last 24 hours) at 3/10/2020 1607  Last data filed at 3/10/2020 0852  Gross per 24 hour   Intake 870 ml   Output 525 ml   Net 345 ml       Physical Exam  Vitals signs reviewed. Constitutional:       Appearance: He is well-developed. HENT:      Head: Normocephalic and atraumatic. Eyes:      Conjunctiva/sclera: Conjunctivae normal.      Pupils: Pupils are equal, round, and reactive to light. Cardiovascular:      Rate and Rhythm: Normal rate and regular rhythm. Heart sounds: Normal heart sounds. Pulmonary:      Effort: Pulmonary effort is normal.      Breath sounds: Normal breath sounds. Abdominal:      General: Abdomen is flat. Palpations: Abdomen is soft. Skin:     General: Skin is warm and dry. Neurological:      Mental Status: He is alert and oriented to person, place, and time. Labs:  BMP:   Recent Labs     03/07/20 1837 03/07/20 1848     --    K 4.1 3.5   CL 99  --    CO2 22  --    BUN 25*  --    CREATININE 1.5*  --    CALCIUM 8.3  --      CBC:   Recent Labs     03/07/20  1906   WBC 8.5   HGB 12.2*   HCT 36.8*   MCV 95.3*        LIVER PROFILE:   Recent Labs     03/07/20 1837   AST 20   ALT 10   BILITOT 1.3*   ALKPHOS 93     PT/INR:   Recent Labs     03/07/20 1837   PROTIME 19.6*   INR 1.64*     APTT:   Recent Labs     03/07/20 1837   APTT 28.3     BNP:  No results for input(s): BNP in the last 72 hours. Ionized Calcium:No results for input(s): IONCA in the last 72 hours. Magnesium:No results for input(s): MG in the last 72 hours. Phosphorus:No results for input(s): PHOS in the last 72 hours. HgbA1C: No results for input(s): LABA1C in the last 72 hours. Hepatic:   Recent Labs     03/07/20 1837   ALKPHOS 93   ALT 10   AST 20   PROT 5.8*   BILITOT 1.3*   LABALBU 2.8*     Lactic Acid: No results for input(s): LACTA in the last 72 hours. Troponin: No results for input(s): CKTOTAL, CKMB, TROPONINT in the last 72 hours. ABGs: No results for input(s): PH, PCO2, PO2, HCO3, O2SAT in the last 72 hours. CRP:  No results for input(s): CRP in the last 72 hours. Sed Rate:  No results for input(s): SEDRATE in the last 72 hours. Cultures:   No results for input(s): CULTURE in the last 72 hours. Recent Labs     03/07/20 1815 03/07/20 1837   BC No Growth to date. Any change in status will be called. --    BLOODCULT2  --  No Growth to date. Any change in status will be called. No results for input(s): CXSURG in the last 72 hours.     Radiology reports as per the Radiologist  Radiology: Xr Chest Portable    Result Date: 3/8/2020  EXAMINATION: XR CHEST PORTABLE 3/8/2020 7:01 AM HISTORY: XR CHEST PORTABLE 3/7/2020 11:30 PM HISTORY: Right central catheter COMPARISON: March 7, 2020. FINDINGS: Right lung is clear. Airspace filling infiltrate in the left lung is noted consistent with pneumonia. . The cardiac silhouette is mildly enlarged. Right internal jugular catheter is satisfactorily positioned. . The osseous structures and surrounding soft tissues demonstrate no acute abnormality. 1. Persistent infiltrate consistent with pneumonia left lung. . Signed by Dr Nory Barker on 3/8/2020 7:03 AM    Xr Chest Portable    Result Date: 3/7/2020  XR CHEST PORTABLE 3/7/2020 5:45 PM HISTORY:   Sepsis  Single view. COMPARISONS:  2/21/2020 and 12/24/2014 FINDINGS: There are airspace opacities involving the left perihilar, lingula and lower lobe region, acute infectious/inflammatory change, pneumonia considered. Mild right lower lobe/middle lobe interstitial prominence noted. Correlate with patient presentation. Follow-up recommended. The heart is generous, point circulation appropriate, without heart failure. No acute osseous abdomen is identified. Left Perihilar/lingular airspace opacities, acute infectious/inflammatory change, pneumonia considered. Signed by Dr Rachel Angulo on 3/7/2020 7:34 PM       Assessment     Severe sepsis. Resolved.     MRSA pneumonia. Changed to oral Zyvox.     Urinary retention. Continue Robin catheter. We discussed the risks and benefits of leaving the Robin catheter in i.e. increased risk of infection. He is willing to accept that because he otherwise is unable to empty his bladder. Continue Flomax and Proscar. Consult hospice. Tentative transfer back to the nursing home tomorrow and provide hospice services after discharge.       Siddhartha Nguyen,

## 2020-03-11 VITALS
HEIGHT: 71 IN | OXYGEN SATURATION: 95 % | SYSTOLIC BLOOD PRESSURE: 142 MMHG | WEIGHT: 238.5 LBS | HEART RATE: 78 BPM | DIASTOLIC BLOOD PRESSURE: 67 MMHG | BODY MASS INDEX: 33.39 KG/M2 | TEMPERATURE: 97.1 F | RESPIRATION RATE: 18 BRPM

## 2020-03-11 PROBLEM — I63.9 CVA (CEREBRAL VASCULAR ACCIDENT) (HCC): Status: ACTIVE | Noted: 2020-03-11

## 2020-03-11 LAB
ADENOVIRUS PCR: NOT DETECTED
HUMAN METAPNEUMOVIRUS PCR: NOT DETECTED
INFLUENZA A: NOT DETECTED
INFLUENZA B: NOT DETECTED
PARAINFLUENZA 1 PCR: NOT DETECTED
PARAINFLUENZA 2 PCR: NOT DETECTED
PARAINFLUENZA 3 PCR: NOT DETECTED
PARAINFLUENZA 4 PCR: NOT DETECTED
RHINO/ENTEROVIRUS PCR: NOT DETECTED
RSV BY PCR: NOT DETECTED
RSV SOURCE: NORMAL

## 2020-03-11 PROCEDURE — 2700000000 HC OXYGEN THERAPY PER DAY

## 2020-03-11 PROCEDURE — 6370000000 HC RX 637 (ALT 250 FOR IP): Performed by: HOSPITALIST

## 2020-03-11 PROCEDURE — 6370000000 HC RX 637 (ALT 250 FOR IP): Performed by: INTERNAL MEDICINE

## 2020-03-11 RX ORDER — LINEZOLID 600 MG/1
600 TABLET, FILM COATED ORAL EVERY 12 HOURS SCHEDULED
Qty: 14 TABLET | Refills: 0 | Status: SHIPPED | OUTPATIENT
Start: 2020-03-11 | End: 2020-03-18

## 2020-03-11 RX ADMIN — TAMSULOSIN HYDROCHLORIDE 0.4 MG: 0.4 CAPSULE ORAL at 09:28

## 2020-03-11 RX ADMIN — ACETAMINOPHEN 650 MG: 325 TABLET ORAL at 05:27

## 2020-03-11 RX ADMIN — CLOPIDOGREL BISULFATE 75 MG: 75 TABLET ORAL at 09:28

## 2020-03-11 RX ADMIN — FINASTERIDE 5 MG: 5 TABLET, FILM COATED ORAL at 09:28

## 2020-03-11 RX ADMIN — LINEZOLID 600 MG: 600 TABLET, FILM COATED ORAL at 09:28

## 2020-03-11 ASSESSMENT — PAIN SCALES - GENERAL: PAINLEVEL_OUTOF10: 3

## 2020-03-11 ASSESSMENT — PAIN DESCRIPTION - DESCRIPTORS: DESCRIPTORS: ACHING

## 2020-03-11 ASSESSMENT — PAIN DESCRIPTION - FREQUENCY: FREQUENCY: CONTINUOUS

## 2020-03-11 ASSESSMENT — PAIN DESCRIPTION - PROGRESSION: CLINICAL_PROGRESSION: NOT CHANGED

## 2020-03-11 ASSESSMENT — PAIN DESCRIPTION - ONSET: ONSET: ON-GOING

## 2020-03-11 ASSESSMENT — PAIN - FUNCTIONAL ASSESSMENT: PAIN_FUNCTIONAL_ASSESSMENT: PREVENTS OR INTERFERES SOME ACTIVE ACTIVITIES AND ADLS

## 2020-03-11 ASSESSMENT — PAIN DESCRIPTION - PAIN TYPE: TYPE: CHRONIC PAIN

## 2020-03-11 ASSESSMENT — PAIN DESCRIPTION - ORIENTATION: ORIENTATION: RIGHT

## 2020-03-11 ASSESSMENT — PAIN DESCRIPTION - LOCATION: LOCATION: SHOULDER

## 2020-03-11 NOTE — DISCHARGE SUMMARY
WBC, HGB, PLT in the last 72 hours. BMP:  No results for input(s): NA, K, CL, CO2, BUN, CREATININE, GLUCOSE in the last 72 hours. Hepatic: No results for input(s): AST, ALT, ALB, BILITOT, ALKPHOS in the last 72 hours. Troponin: No results for input(s): TROPONINI in the last 72 hours. BNP: No results for input(s): BNP in the last 72 hours. Lipids: No results for input(s): CHOL, HDL in the last 72 hours. Invalid input(s): LDLCALCU  INR: No results for input(s): INR in the last 72 hours. ABG: No results for input(s): PHART, THC7QUY, PO2ART, YXR6ICC, Z5TEKGDP, BEART in the last 72 hours. 30 Day lookback of cultures:    Blood Culture Recent:   Recent Labs     03/07/20  1815   BC No Growth to date. Any change in status will be called. Gram Stain Recent:   Recent Labs     03/09/20  1100   LABGRAM Few WBC's (Polymorphonuclear)  Few Epithelial Cells  Few Gram positive cocci  in pairs   Mixed bacterial morphotypes suggestive of  Normal respiratory lesvia       Resp Culture Recent:   Recent Labs     03/09/20  1100   CULTRESP Light growth normal respiratory lesvia with*  Light growth  Sensitivity to follow       Body Fluid Recent : No results for input(s): BFCX in the last 720 hours. MRSA Recent :   Recent Labs     03/08/20  0630   [facility] POSITIVE FOR  CONTACT PRECAUTIONS INDICATED  *      Urine Culture Recent : No results for input(s): Jenny Stover in the last 720 hours. Organism Recent :   Recent Labs     03/09/20  1100   [facility]: 80 y.o. male who presents with fever, cough, fatigue. In the emergency department chest x-ray showed left lower lobe pneumonia. His vitals were as follows: Temperature 101.4 °F, respiratory rate 38/min, pulse 115 bpm, blood pressure 77/46 mmHg.     Patient was admitted to the intensive care unit for further evaluation and treatment of sepsis and left lower lobe pneumonia. He was placed on IV abx and improved. Patient's symptoms improved.  His BP meds

## 2020-03-11 NOTE — DISCHARGE INSTR - COC
Status     Infection Onset Added Last Indicated Last Indicated By Review Planned Expiration Resolved Resolved By    MRSA 03/08/20 03/09/20 03/08/20 Culture, MRSA, Screening              Nurse Assessment:  Last Vital Signs: /69   Pulse 73   Temp 96.9 °F (36.1 °C) (Temporal)   Resp 18   Ht 5' 11\" (1.803 m)   Wt 238 lb 8 oz (108.2 kg)   SpO2 96%   BMI 33.26 kg/m²     Last documented pain score (0-10 scale): Pain Level: 3  Last Weight:   Wt Readings from Last 1 Encounters:   03/11/20 238 lb 8 oz (108.2 kg)     Mental Status:  oriented and alert    IV Access:  - None    Nursing Mobility/ADLs:  Walking   Dependent  Transfer  Dependent  Bathing  Dependent  Dressing  Dependent  Toileting  Dependent  Feeding  Assisted  Med Admin  Assisted  Med Delivery   whole    Wound Care Documentation and Therapy:        Elimination:  Continence:   · Bowel: No  · Bladder: pavon catheter in place  Urinary Catheter: pavon catheter   Colostomy/Ileostomy/Ileal Conduit: No       Date of Last BM: 3/11/20    Intake/Output Summary (Last 24 hours) at 3/11/2020 1057  Last data filed at 3/11/2020 0816  Gross per 24 hour   Intake 480 ml   Output 2750 ml   Net -2270 ml     I/O last 3 completed shifts: In: 750 [P.O.:750]  Out: 2750 [Urine:2750]    Safety Concerns: At Risk for Falls    Impairments/Disabilities:      None    Nutrition Therapy:  Current Nutrition Therapy:   - Oral Diet:  General    Routes of Feeding: Oral  Liquids: Thin Liquids  Daily Fluid Restriction: no  Last Modified Barium Swallow with Video (Video Swallowing Test): not done    Treatments at the Time of Hospital Discharge:   Respiratory Treatments: nasal cannula  Oxygen Therapy:  is on oxygen at 2 L/min per nasal cannula.   Ventilator:    - No ventilator support    Rehab Therapies: Physical Therapy, Occupational Therapy and Speech/Language Therapy  Weight Bearing Status/Restrictions: No weight bearing restirctions  Other Medical Equipment (for information only, NOT a

## 2020-03-12 LAB
BLOOD CULTURE, ROUTINE: NORMAL
CULTURE, BLOOD 2: NORMAL
CULTURE, RESPIRATORY: ABNORMAL
CULTURE, RESPIRATORY: ABNORMAL
GRAM STAIN RESULT: ABNORMAL
ORGANISM: ABNORMAL

## 2020-07-20 ENCOUNTER — HOSPITAL ENCOUNTER (INPATIENT)
Age: 85
LOS: 1 days | Discharge: NURSING, REHAB OR LONG-TERM CARE | DRG: 282 | End: 2020-07-22
Attending: PEDIATRICS | Admitting: HOSPITALIST
Payer: MEDICARE

## 2020-07-20 ENCOUNTER — APPOINTMENT (OUTPATIENT)
Dept: GENERAL RADIOLOGY | Age: 85
DRG: 282 | End: 2020-07-20
Payer: MEDICARE

## 2020-07-20 LAB
BASOPHILS ABSOLUTE: 0 K/UL (ref 0–0.2)
BASOPHILS RELATIVE PERCENT: 0.5 % (ref 0–1)
EOSINOPHILS ABSOLUTE: 0.3 K/UL (ref 0–0.6)
EOSINOPHILS RELATIVE PERCENT: 3.1 % (ref 0–5)
HCT VFR BLD CALC: 39.6 % (ref 42–52)
HEMOGLOBIN: 13.5 G/DL (ref 14–18)
IMMATURE GRANULOCYTES #: 0 K/UL
LYMPHOCYTES ABSOLUTE: 2.2 K/UL (ref 1.1–4.5)
LYMPHOCYTES RELATIVE PERCENT: 24.7 % (ref 20–40)
MCH RBC QN AUTO: 32 PG (ref 27–31)
MCHC RBC AUTO-ENTMCNC: 34.1 G/DL (ref 33–37)
MCV RBC AUTO: 93.8 FL (ref 80–94)
MONOCYTES ABSOLUTE: 0.9 K/UL (ref 0–0.9)
MONOCYTES RELATIVE PERCENT: 10.3 % (ref 0–10)
NEUTROPHILS ABSOLUTE: 5.4 K/UL (ref 1.5–7.5)
NEUTROPHILS RELATIVE PERCENT: 60.9 % (ref 50–65)
PDW BLD-RTO: 14.8 % (ref 11.5–14.5)
PLATELET # BLD: 187 K/UL (ref 130–400)
PMV BLD AUTO: 10 FL (ref 9.4–12.4)
RBC # BLD: 4.22 M/UL (ref 4.7–6.1)
REASON FOR REJECTION: NORMAL
REJECTED TEST: NORMAL
WBC # BLD: 8.9 K/UL (ref 4.8–10.8)

## 2020-07-20 PROCEDURE — 93005 ELECTROCARDIOGRAM TRACING: CPT | Performed by: PEDIATRICS

## 2020-07-20 PROCEDURE — 99285 EMERGENCY DEPT VISIT HI MDM: CPT

## 2020-07-20 PROCEDURE — 71045 X-RAY EXAM CHEST 1 VIEW: CPT

## 2020-07-20 RX ORDER — BUMETANIDE 0.5 MG/1
2 TABLET ORAL DAILY
COMMUNITY

## 2020-07-20 RX ORDER — MELOXICAM 15 MG/1
15 TABLET ORAL DAILY
Status: ON HOLD | COMMUNITY
End: 2020-07-22 | Stop reason: HOSPADM

## 2020-07-20 ASSESSMENT — PAIN DESCRIPTION - LOCATION: LOCATION: CHEST

## 2020-07-20 ASSESSMENT — PAIN DESCRIPTION - ONSET: ONSET: ON-GOING

## 2020-07-20 ASSESSMENT — PAIN DESCRIPTION - PROGRESSION: CLINICAL_PROGRESSION: NOT CHANGED

## 2020-07-20 ASSESSMENT — PAIN DESCRIPTION - FREQUENCY: FREQUENCY: CONTINUOUS

## 2020-07-20 ASSESSMENT — PAIN DESCRIPTION - PAIN TYPE: TYPE: ACUTE PAIN

## 2020-07-20 ASSESSMENT — PAIN DESCRIPTION - DESCRIPTORS: DESCRIPTORS: TIGHTNESS

## 2020-07-20 ASSESSMENT — PAIN SCALES - GENERAL: PAINLEVEL_OUTOF10: 6

## 2020-07-20 ASSESSMENT — PAIN DESCRIPTION - ORIENTATION: ORIENTATION: MID

## 2020-07-21 PROBLEM — R79.89 ELEVATED SERUM CREATININE: Status: ACTIVE | Noted: 2020-07-21

## 2020-07-21 PROBLEM — I21.4 NSTEMI (NON-ST ELEVATED MYOCARDIAL INFARCTION) (HCC): Status: ACTIVE | Noted: 2020-07-21

## 2020-07-21 PROBLEM — R07.9 CHEST PAIN: Status: ACTIVE | Noted: 2020-07-21

## 2020-07-21 LAB
ALBUMIN SERPL-MCNC: 3.9 G/DL (ref 3.5–5.2)
ALP BLD-CCNC: 102 U/L (ref 40–130)
ALT SERPL-CCNC: 17 U/L (ref 5–41)
ANION GAP SERPL CALCULATED.3IONS-SCNC: 10 MMOL/L (ref 7–19)
APTT: 122.7 SEC (ref 26–36.2)
APTT: 29.2 SEC (ref 26–36.2)
AST SERPL-CCNC: 17 U/L (ref 5–40)
BILIRUB SERPL-MCNC: 0.4 MG/DL (ref 0.2–1.2)
BUN BLDV-MCNC: 37 MG/DL (ref 8–23)
CALCIUM SERPL-MCNC: 8.8 MG/DL (ref 8.2–9.6)
CHLORIDE BLD-SCNC: 100 MMOL/L (ref 98–111)
CO2: 26 MMOL/L (ref 22–29)
CREAT SERPL-MCNC: 1.7 MG/DL (ref 0.5–1.2)
EKG P AXIS: 44 DEGREES
EKG P-R INTERVAL: 226 MS
EKG Q-T INTERVAL: 372 MS
EKG QRS DURATION: 94 MS
EKG QTC CALCULATION (BAZETT): 414 MS
EKG T AXIS: 48 DEGREES
GFR AFRICAN AMERICAN: 46
GFR NON-AFRICAN AMERICAN: 38
GLUCOSE BLD-MCNC: 118 MG/DL (ref 74–109)
HCT VFR BLD CALC: 37.1 % (ref 42–52)
HEMOGLOBIN: 12.4 G/DL (ref 14–18)
LV EF: 43 %
LVEF MODALITY: NORMAL
MCH RBC QN AUTO: 31.8 PG (ref 27–31)
MCHC RBC AUTO-ENTMCNC: 33.4 G/DL (ref 33–37)
MCV RBC AUTO: 95.1 FL (ref 80–94)
PDW BLD-RTO: 14.8 % (ref 11.5–14.5)
PLATELET # BLD: 164 K/UL (ref 130–400)
PMV BLD AUTO: 9.4 FL (ref 9.4–12.4)
POTASSIUM SERPL-SCNC: 5 MMOL/L (ref 3.5–5)
PRO-BNP: 876 PG/ML (ref 0–1800)
RBC # BLD: 3.9 M/UL (ref 4.7–6.1)
SODIUM BLD-SCNC: 136 MMOL/L (ref 136–145)
TOTAL PROTEIN: 6.3 G/DL (ref 6.6–8.7)
TROPONIN: 0.03 NG/ML (ref 0–0.03)
TROPONIN: 0.03 NG/ML (ref 0–0.03)
TROPONIN: 0.04 NG/ML (ref 0–0.03)
WBC # BLD: 8 K/UL (ref 4.8–10.8)

## 2020-07-21 PROCEDURE — 6360000002 HC RX W HCPCS: Performed by: HOSPITALIST

## 2020-07-21 PROCEDURE — 80053 COMPREHEN METABOLIC PANEL: CPT

## 2020-07-21 PROCEDURE — 36415 COLL VENOUS BLD VENIPUNCTURE: CPT

## 2020-07-21 PROCEDURE — 6370000000 HC RX 637 (ALT 250 FOR IP): Performed by: INTERNAL MEDICINE

## 2020-07-21 PROCEDURE — C1751 CATH, INF, PER/CENT/MIDLINE: HCPCS

## 2020-07-21 PROCEDURE — 85025 COMPLETE CBC W/AUTO DIFF WBC: CPT

## 2020-07-21 PROCEDURE — 76937 US GUIDE VASCULAR ACCESS: CPT

## 2020-07-21 PROCEDURE — 6370000000 HC RX 637 (ALT 250 FOR IP): Performed by: HOSPITALIST

## 2020-07-21 PROCEDURE — 83880 ASSAY OF NATRIURETIC PEPTIDE: CPT

## 2020-07-21 PROCEDURE — 2580000003 HC RX 258: Performed by: PEDIATRICS

## 2020-07-21 PROCEDURE — 05HB33Z INSERTION OF INFUSION DEVICE INTO RIGHT BASILIC VEIN, PERCUTANEOUS APPROACH: ICD-10-PCS | Performed by: INTERNAL MEDICINE

## 2020-07-21 PROCEDURE — 36410 VNPNXR 3YR/> PHY/QHP DX/THER: CPT

## 2020-07-21 PROCEDURE — 85730 THROMBOPLASTIN TIME PARTIAL: CPT

## 2020-07-21 PROCEDURE — 6360000004 HC RX CONTRAST MEDICATION: Performed by: HOSPITALIST

## 2020-07-21 PROCEDURE — 94640 AIRWAY INHALATION TREATMENT: CPT

## 2020-07-21 PROCEDURE — 93010 ELECTROCARDIOGRAM REPORT: CPT | Performed by: INTERNAL MEDICINE

## 2020-07-21 PROCEDURE — 2580000003 HC RX 258: Performed by: HOSPITALIST

## 2020-07-21 PROCEDURE — 6370000000 HC RX 637 (ALT 250 FOR IP): Performed by: PEDIATRICS

## 2020-07-21 PROCEDURE — 85027 COMPLETE CBC AUTOMATED: CPT

## 2020-07-21 PROCEDURE — 84484 ASSAY OF TROPONIN QUANT: CPT

## 2020-07-21 PROCEDURE — 2140000000 HC CCU INTERMEDIATE R&B

## 2020-07-21 PROCEDURE — C8929 TTE W OR WO FOL WCON,DOPPLER: HCPCS

## 2020-07-21 PROCEDURE — 2700000000 HC OXYGEN THERAPY PER DAY

## 2020-07-21 PROCEDURE — 99223 1ST HOSP IP/OBS HIGH 75: CPT | Performed by: INTERNAL MEDICINE

## 2020-07-21 RX ORDER — METOPROLOL SUCCINATE 50 MG/1
50 TABLET, EXTENDED RELEASE ORAL DAILY
Status: DISCONTINUED | OUTPATIENT
Start: 2020-07-22 | End: 2020-07-23 | Stop reason: HOSPADM

## 2020-07-21 RX ORDER — ASPIRIN 81 MG/1
81 TABLET, CHEWABLE ORAL DAILY
Status: DISCONTINUED | OUTPATIENT
Start: 2020-07-22 | End: 2020-07-23 | Stop reason: HOSPADM

## 2020-07-21 RX ORDER — ACETAMINOPHEN 325 MG/1
650 TABLET ORAL EVERY 6 HOURS PRN
Status: DISCONTINUED | OUTPATIENT
Start: 2020-07-21 | End: 2020-07-23 | Stop reason: HOSPADM

## 2020-07-21 RX ORDER — MAGNESIUM SULFATE IN WATER 40 MG/ML
2 INJECTION, SOLUTION INTRAVENOUS PRN
Status: DISCONTINUED | OUTPATIENT
Start: 2020-07-21 | End: 2020-07-23 | Stop reason: HOSPADM

## 2020-07-21 RX ORDER — IPRATROPIUM BROMIDE AND ALBUTEROL SULFATE 2.5; .5 MG/3ML; MG/3ML
1 SOLUTION RESPIRATORY (INHALATION) ONCE
Status: COMPLETED | OUTPATIENT
Start: 2020-07-21 | End: 2020-07-21

## 2020-07-21 RX ORDER — HEPARIN SODIUM 1000 [USP'U]/ML
4000 INJECTION, SOLUTION INTRAVENOUS; SUBCUTANEOUS PRN
Status: DISCONTINUED | OUTPATIENT
Start: 2020-07-21 | End: 2020-07-22

## 2020-07-21 RX ORDER — TAMSULOSIN HYDROCHLORIDE 0.4 MG/1
0.4 CAPSULE ORAL 2 TIMES DAILY
Status: DISCONTINUED | OUTPATIENT
Start: 2020-07-21 | End: 2020-07-23 | Stop reason: HOSPADM

## 2020-07-21 RX ORDER — METOPROLOL SUCCINATE 25 MG/1
25 TABLET, EXTENDED RELEASE ORAL ONCE
Status: COMPLETED | OUTPATIENT
Start: 2020-07-21 | End: 2020-07-21

## 2020-07-21 RX ORDER — POTASSIUM CHLORIDE 7.45 MG/ML
10 INJECTION INTRAVENOUS PRN
Status: DISCONTINUED | OUTPATIENT
Start: 2020-07-21 | End: 2020-07-23 | Stop reason: HOSPADM

## 2020-07-21 RX ORDER — PHENOL 1.4 %
10 AEROSOL, SPRAY (ML) MUCOUS MEMBRANE NIGHTLY PRN
Status: DISCONTINUED | OUTPATIENT
Start: 2020-07-21 | End: 2020-07-21 | Stop reason: SDUPTHER

## 2020-07-21 RX ORDER — ATORVASTATIN CALCIUM 40 MG/1
80 TABLET, FILM COATED ORAL NIGHTLY
Status: DISCONTINUED | OUTPATIENT
Start: 2020-07-21 | End: 2020-07-23 | Stop reason: HOSPADM

## 2020-07-21 RX ORDER — HEPARIN SODIUM 10000 [USP'U]/100ML
10.26 INJECTION, SOLUTION INTRAVENOUS CONTINUOUS
Status: DISCONTINUED | OUTPATIENT
Start: 2020-07-21 | End: 2020-07-22

## 2020-07-21 RX ORDER — POTASSIUM CHLORIDE 20 MEQ/1
40 TABLET, EXTENDED RELEASE ORAL PRN
Status: DISCONTINUED | OUTPATIENT
Start: 2020-07-21 | End: 2020-07-23 | Stop reason: HOSPADM

## 2020-07-21 RX ORDER — PANTOPRAZOLE SODIUM 20 MG/1
20 TABLET, DELAYED RELEASE ORAL DAILY
Status: DISCONTINUED | OUTPATIENT
Start: 2020-07-21 | End: 2020-07-23 | Stop reason: HOSPADM

## 2020-07-21 RX ORDER — HEPARIN SODIUM 1000 [USP'U]/ML
4000 INJECTION, SOLUTION INTRAVENOUS; SUBCUTANEOUS ONCE
Status: COMPLETED | OUTPATIENT
Start: 2020-07-21 | End: 2020-07-21

## 2020-07-21 RX ORDER — METOPROLOL SUCCINATE 25 MG/1
25 TABLET, EXTENDED RELEASE ORAL DAILY
Status: DISCONTINUED | OUTPATIENT
Start: 2020-07-21 | End: 2020-07-21

## 2020-07-21 RX ORDER — SODIUM CHLORIDE 0.9 % (FLUSH) 0.9 %
10 SYRINGE (ML) INJECTION EVERY 12 HOURS SCHEDULED
Status: DISCONTINUED | OUTPATIENT
Start: 2020-07-21 | End: 2020-07-23 | Stop reason: HOSPADM

## 2020-07-21 RX ORDER — CLOPIDOGREL BISULFATE 75 MG/1
75 TABLET ORAL DAILY
Status: DISCONTINUED | OUTPATIENT
Start: 2020-07-21 | End: 2020-07-23 | Stop reason: HOSPADM

## 2020-07-21 RX ORDER — HEPARIN SODIUM 1000 [USP'U]/ML
2000 INJECTION, SOLUTION INTRAVENOUS; SUBCUTANEOUS PRN
Status: DISCONTINUED | OUTPATIENT
Start: 2020-07-21 | End: 2020-07-22

## 2020-07-21 RX ORDER — NITROGLYCERIN 0.4 MG/1
0.4 TABLET SUBLINGUAL ONCE
Status: COMPLETED | OUTPATIENT
Start: 2020-07-21 | End: 2020-07-21

## 2020-07-21 RX ORDER — ASPIRIN 81 MG/1
324 TABLET, CHEWABLE ORAL ONCE
Status: COMPLETED | OUTPATIENT
Start: 2020-07-21 | End: 2020-07-21

## 2020-07-21 RX ORDER — IPRATROPIUM BROMIDE AND ALBUTEROL SULFATE 2.5; .5 MG/3ML; MG/3ML
1 SOLUTION RESPIRATORY (INHALATION)
Status: DISCONTINUED | OUTPATIENT
Start: 2020-07-21 | End: 2020-07-21

## 2020-07-21 RX ORDER — 0.9 % SODIUM CHLORIDE 0.9 %
250 INTRAVENOUS SOLUTION INTRAVENOUS ONCE
Status: COMPLETED | OUTPATIENT
Start: 2020-07-21 | End: 2020-07-21

## 2020-07-21 RX ORDER — PROMETHAZINE HYDROCHLORIDE 25 MG/1
12.5 TABLET ORAL EVERY 6 HOURS PRN
Status: DISCONTINUED | OUTPATIENT
Start: 2020-07-21 | End: 2020-07-23 | Stop reason: HOSPADM

## 2020-07-21 RX ORDER — ISOSORBIDE MONONITRATE 30 MG/1
30 TABLET, EXTENDED RELEASE ORAL DAILY
Status: DISCONTINUED | OUTPATIENT
Start: 2020-07-21 | End: 2020-07-23 | Stop reason: HOSPADM

## 2020-07-21 RX ORDER — SODIUM CHLORIDE 0.9 % (FLUSH) 0.9 %
10 SYRINGE (ML) INJECTION PRN
Status: DISCONTINUED | OUTPATIENT
Start: 2020-07-21 | End: 2020-07-23 | Stop reason: HOSPADM

## 2020-07-21 RX ORDER — ONDANSETRON 2 MG/ML
4 INJECTION INTRAMUSCULAR; INTRAVENOUS EVERY 6 HOURS PRN
Status: DISCONTINUED | OUTPATIENT
Start: 2020-07-21 | End: 2020-07-23 | Stop reason: HOSPADM

## 2020-07-21 RX ORDER — ACETAMINOPHEN 650 MG/1
650 SUPPOSITORY RECTAL EVERY 6 HOURS PRN
Status: DISCONTINUED | OUTPATIENT
Start: 2020-07-21 | End: 2020-07-23 | Stop reason: HOSPADM

## 2020-07-21 RX ORDER — POLYETHYLENE GLYCOL 3350 17 G/17G
17 POWDER, FOR SOLUTION ORAL DAILY PRN
Status: DISCONTINUED | OUTPATIENT
Start: 2020-07-21 | End: 2020-07-23 | Stop reason: HOSPADM

## 2020-07-21 RX ADMIN — METOPROLOL SUCCINATE 25 MG: 25 TABLET, EXTENDED RELEASE ORAL at 09:14

## 2020-07-21 RX ADMIN — HEPARIN SODIUM 4000 UNITS: 1000 INJECTION INTRAVENOUS; SUBCUTANEOUS at 09:18

## 2020-07-21 RX ADMIN — IPRATROPIUM BROMIDE 0.5 MG: 0.5 SOLUTION RESPIRATORY (INHALATION) at 22:17

## 2020-07-21 RX ADMIN — ISOSORBIDE MONONITRATE 30 MG: 30 TABLET, EXTENDED RELEASE ORAL at 21:32

## 2020-07-21 RX ADMIN — TAMSULOSIN HYDROCHLORIDE 0.4 MG: 0.4 CAPSULE ORAL at 21:32

## 2020-07-21 RX ADMIN — NITROGLYCERIN 0.5 INCH: 20 OINTMENT TOPICAL at 18:40

## 2020-07-21 RX ADMIN — SODIUM CHLORIDE, PRESERVATIVE FREE 10 ML: 5 INJECTION INTRAVENOUS at 22:32

## 2020-07-21 RX ADMIN — IPRATROPIUM BROMIDE AND ALBUTEROL SULFATE 1 AMPULE: 2.5; .5 SOLUTION RESPIRATORY (INHALATION) at 01:34

## 2020-07-21 RX ADMIN — SODIUM CHLORIDE 250 ML: 9 INJECTION, SOLUTION INTRAVENOUS at 05:19

## 2020-07-21 RX ADMIN — ATORVASTATIN CALCIUM 80 MG: 40 TABLET, FILM COATED ORAL at 21:32

## 2020-07-21 RX ADMIN — METOPROLOL SUCCINATE 25 MG: 25 TABLET, EXTENDED RELEASE ORAL at 18:39

## 2020-07-21 RX ADMIN — TAMSULOSIN HYDROCHLORIDE 0.4 MG: 0.4 CAPSULE ORAL at 09:14

## 2020-07-21 RX ADMIN — NITROGLYCERIN 0.4 MG: 0.4 TABLET, ORALLY DISINTEGRATING SUBLINGUAL at 04:21

## 2020-07-21 RX ADMIN — HEPARIN SODIUM 10.26 UNITS/KG/HR: 10000 INJECTION, SOLUTION INTRAVENOUS at 09:20

## 2020-07-21 RX ADMIN — ASPIRIN 324 MG: 81 TABLET, CHEWABLE ORAL at 04:22

## 2020-07-21 RX ADMIN — SODIUM CHLORIDE, PRESERVATIVE FREE 10 ML: 5 INJECTION INTRAVENOUS at 09:37

## 2020-07-21 RX ADMIN — SERTRALINE HYDROCHLORIDE 50 MG: 50 TABLET ORAL at 09:14

## 2020-07-21 RX ADMIN — IPRATROPIUM BROMIDE 0.5 MG: 0.5 SOLUTION RESPIRATORY (INHALATION) at 14:09

## 2020-07-21 RX ADMIN — PERFLUTREN 1.65 MG: 6.52 INJECTION, SUSPENSION INTRAVENOUS at 16:05

## 2020-07-21 RX ADMIN — IPRATROPIUM BROMIDE 0.5 MG: 0.5 SOLUTION RESPIRATORY (INHALATION) at 07:05

## 2020-07-21 RX ADMIN — ACETAMINOPHEN 650 MG: 325 TABLET, FILM COATED ORAL at 21:32

## 2020-07-21 RX ADMIN — CLOPIDOGREL BISULFATE 75 MG: 75 TABLET ORAL at 09:14

## 2020-07-21 RX ADMIN — PANTOPRAZOLE SODIUM 20 MG: 20 TABLET, DELAYED RELEASE ORAL at 09:14

## 2020-07-21 ASSESSMENT — PAIN DESCRIPTION - LOCATION: LOCATION: SHOULDER

## 2020-07-21 ASSESSMENT — PAIN DESCRIPTION - DESCRIPTORS: DESCRIPTORS: ACHING

## 2020-07-21 ASSESSMENT — ENCOUNTER SYMPTOMS
NAUSEA: 0
RESPIRATORY NEGATIVE: 1
DIARRHEA: 0
SHORTNESS OF BREATH: 0
EYES NEGATIVE: 1
GASTROINTESTINAL NEGATIVE: 1
VOMITING: 0

## 2020-07-21 ASSESSMENT — PAIN SCALES - GENERAL
PAINLEVEL_OUTOF10: 0
PAINLEVEL_OUTOF10: 0
PAINLEVEL_OUTOF10: 5

## 2020-07-21 ASSESSMENT — PAIN DESCRIPTION - FREQUENCY: FREQUENCY: CONTINUOUS

## 2020-07-21 ASSESSMENT — PAIN DESCRIPTION - ORIENTATION: ORIENTATION: RIGHT

## 2020-07-21 ASSESSMENT — PAIN - FUNCTIONAL ASSESSMENT: PAIN_FUNCTIONAL_ASSESSMENT: PREVENTS OR INTERFERES SOME ACTIVE ACTIVITIES AND ADLS

## 2020-07-21 ASSESSMENT — PAIN DESCRIPTION - PROGRESSION: CLINICAL_PROGRESSION: NOT CHANGED

## 2020-07-21 ASSESSMENT — PAIN DESCRIPTION - ONSET: ONSET: ON-GOING

## 2020-07-21 ASSESSMENT — PAIN DESCRIPTION - PAIN TYPE: TYPE: CHRONIC PAIN

## 2020-07-21 NOTE — CARE COORDINATION
Informed by Jonetta Krabbe in admission with Dinesh Saavedra the pt is a resident and has a bed hold to return when medically stable. [facility] Care of [address] [facility]  [phone]     Based upon the President's actions and the 's authority under Section 1135 and Section 1812(f), SNF care without a 3-day inpatient hospital stay will be covered for beneficiaries who experience dislocations or are otherwise affected by COVID-19. .      In addition, for certain beneficiaries who recently exhausted their SNF benefits, it authorizes renewed SNF coverage without first having to start a new benefit period (this waiver will apply only for those beneficiaries who have been delayed or prevented by the emergency itself from commencing or completing the process of ending their current benefit period and renewing their SNF benefits that would have occurred under normal circumstances). These waivers and modifications became effective at 6:00 P. M. [facility] Standard Time on March 15, 2020, but will have retroactive effect to March 1, 2020, nationwide, and continue through the period described in Section 1135(e). This patient was placed in a skilled nursing facility under this waiver.

## 2020-07-21 NOTE — PROGRESS NOTES
4 Eyes Skin Assessment    Cynthia Bass is being assessed upon: Admission    I agree that I, [facility], along with *** (either 2 RN's or 1 LPN and 1 RN) have performed a thorough Head to Toe Skin Assessment on the patient. ALL assessment sites listed below have been assessed. Areas assessed by both nurses:     [x]   Head, Face, and Ears   [x]   Shoulders, Back, and Chest  [x]   Arms, Elbows, and Hands   [x]   Coccyx, Sacrum, and Ischium  [x]   Legs, Feet, and Heels    Does the Patient have Skin Breakdown? Yes, wound(s) noted upon assessment. It is the responsibility of the Primary Nurse to assure that the following documentation, preventions, orders, and consults are complete on the above noted wound(s): Wound LDA initiated. LDA Flowsheet Documentation includes the Nati-wound, Wound Assessment, Measurements, Dressing Treatment, Drainage, and Color.     Michele Prevention initiated: Yes  Wound Care Orders initiated: Yes    [address]  nurse consulted for Pressure Injury (Stage 3,4, Unstageable, DTI, NWPT, and Complex wounds) and New or Established Ostomies: Yes        Primary Nurse eSignature: [facility], RN on 7/21/2020 at 5:37 AM      Co-Signer eSignature: {Esignature:[id number]}

## 2020-07-21 NOTE — ED NOTES
Bed: 01-A  Expected date: 7/20/20  Expected time:   Means of arrival: [facility]  Comments:  Marcelina Escobar RN  07/20/20 9012

## 2020-07-21 NOTE — PROGRESS NOTES
Ángel Little arrived to room # 620-5. Presented with: CP  Mental Status: Patient is oriented, alert and coherent. Vitals:    07/21/20 0500   BP: (!) 118/54   Pulse: 79   Resp: 18   Temp: 97.6 °F (36.4 °C)   SpO2: 95%     Patient safety contract and falls prevention contract reviewed with patient Yes. Oriented Patient to room. Call light within reach. Yes.   Needs, issues or concerns expressed at this time: no.      Electronically signed by Turner Vallecillo RN on 7/21/2020 at 5:33 AM

## 2020-07-21 NOTE — PLAN OF CARE
Nutrition Problem #1: Inadequate oral intake  Intervention: Food and/or Nutrient Delivery: Continue NPO  Nutritional Goals: meet nutrient needs through oral diet

## 2020-07-21 NOTE — CONSULTS
Comprehensive Nutrition Assessment    Type and Reason for Visit:  Initial, Consult    Nutrition Recommendations/Plan: Follow for diet advancement and PO intake. Nutrition Assessment:  Consult received for low Michele score. Blister documented to R buttocks as well as skin tear to L elbow. Pt is at risk for nutritional compromise r/t recent wt loss and NPO status. Will follow for diet advancement and implement intervention as needed. Malnutrition Assessment:  Malnutrition Status: At risk for malnutrition (Comment)    Context:  Chronic Illness     Findings of the 6 clinical characteristics of malnutrition:  Energy Intake:  Unable to assess  Weight Loss:  7 - Greater than 10% over 6 months     Body Fat Loss:  Unable to assess     Muscle Mass Loss:  Unable to assess    Fluid Accumulation:  1 - Mild Extremities     Estimated Daily Nutrient Needs:  Energy (kcal):  7849-3440; Weight Used for Energy Requirements:  Current     Protein (g):  ; Weight Used for Protein Requirements:  Ideal(1.25-1. 5)        Fluid (ml/day):  4274-4888; Weight Used for Fluid Requirements:  Current      Wounds:  Skin Tears(blister)       Current Nutrition Therapies:    Diet NPO Effective Now    Anthropometric Measures:  · Height: 5' 10\" (177.8 cm)  · Current Body Weight: 214 lb (97.1 kg)   · Admission Body Weight:      · Usual Body Weight: 238 lb (108 kg)(3/7/2020)     · Ideal Body Weight: 166 lbs; % Ideal Body Weight 128.9 %   · BMI: 30.7  · Adjusted Body Weight:  ; No Adjustment   · BMI Categories: Obese Class 1 (BMI 30.0-34. 9)       Nutrition Diagnosis:   · Inadequate oral intake related to inadequate protein-energy intake, increase demand for energy/nutrients as evidenced by weight loss, wounds      Nutrition Interventions:   Food and/or Nutrient Delivery:  Continue NPO  Nutrition Education/Counseling:  No recommendation at this time   Coordination of Nutrition Care:  Continued Inpatient Monitoring    Goals:  meet nutrient needs through oral diet       Nutrition Monitoring and Evaluation:   Food/Nutrient Intake Outcomes:  Food and Nutrient Intake, Diet Advancement/Tolerance  Physical Signs/Symptoms Outcomes:  Biochemical Data, Skin, Weight     Discharge Planning:     Too soon to determine     Electronically signed by SOLOMON Welch RD on 7/21/20 at 3:07 PM CDT    Contact: [phone]

## 2020-07-21 NOTE — PROCEDURES
Midline Insertion Procedure Note    Procedure: Insertion of #4 FR/18G Midline    Indications:  Poor Access    Procedure Details   Informed consent was obtained for the procedure, including local anesthetic. Risks and benefits were discussed. #4 FR/18G Midline inserted to the R Basilic vein per hospital protocol. Blood return:  yes    Findings:  Catheter inserted to 15 cm, with 0 cm exposed. Catheter was flushed with 10 cc NS. Patient did tolerate procedure well. Recommendations:  Tip distal to axilla per measurements. Okay to use. Midline Brochure given to patient with teaching instruction.

## 2020-07-21 NOTE — ED NOTES
PT received 21 SL nitro at 1830 at [facility], with no relief.       Denice Wolff RN  07/20/20 5588

## 2020-07-21 NOTE — CONSULTS
[facility]  Cardiology Consult      Requesting MD:  Vanessa Tse MD   Admit Status:  Inpatient [101]       History obtained from:   [] Patient  [] Other (specify):     Patient:  Erwin Ladaniel  [id number]     Chief Complaint:   Chief Complaint   Patient presents with    Chest Pain       HPI: Mr. Nghia Fraah is a 80 y.o. male with a history of chronic kidney disease prostate cancer in his usual state of health until last evening had funny feeling followed by pain in his chest some tightness also his arm was hurting. Came here admitted troponins minimally elevated at 0.04. No prior cardiac history or work-up. Does not ambulate at home actually lives in the nursing home. Pain-free presently. No other complaints noted. Review of Systems:  Review of Systems   Constitutional: Negative. Negative for chills, fever and unexpected weight change. HENT: Negative. Eyes: Negative. Respiratory: Negative. Negative for shortness of breath. Cardiovascular: Negative. Negative for chest pain. Gastrointestinal: Negative. Negative for diarrhea, nausea and vomiting. Endocrine: Negative. Genitourinary: Negative. Musculoskeletal: Negative. Skin: Negative. Neurological: Negative. All other systems reviewed and are negative.       Cardiac Specific Data:  Specialty Problems        Cardiology Problems    Essential hypertension        CVA (cerebral vascular accident) [facility])        Chest pain        NSTEMI (non-ST elevated myocardial infarction) 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 ([address]     Hyperlipidemia     Hypertension     Palliative care patient 02/24/2020    TIA (transient ischemic attack)     Vitamin D deficiency         Past Surgical History:  Past Surgical History:   Procedure Laterality Date    ANKLE FUSION      APPENDECTOMY      CHOLECYSTECTOMY      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    KNEE ARTHROPLASTY      bilateral       Past Family History:  Family History   Problem Relation Age of Onset    Diabetes Mother     Heart Disease Mother     Heart Attack Mother     Other Mother         cerebral hemorrhage    Diabetes Father     Other Other         all 5 siblings have diabetes    Other Sister         rheumatic fever       Past Social History:  Social History     Socioeconomic History    Marital status:      Spouse name: Not on file    Number of children: Not on file    Years of education: Not on file    Highest education level: Not on file   Occupational History    Not on file   Social Needs    Financial resource strain: Not on file    Food insecurity     Worry: Not on file     Inability: Not on file    Transportation needs     Medical: Not on file     Non-medical: Not on file   Tobacco Use    Smoking status: Never Smoker    Smokeless tobacco: Never Used   Substance and Sexual Activity    Alcohol use: No    Drug use: No    Sexual activity: Not on file   Lifestyle    Physical activity     Days per week: Not on file     Minutes per session: Not on file    Stress: Not on file   Relationships    Social connections     Talks on phone: Not on file     Gets together: Not on file     Attends Voodoo service: Not on file     Active member of club or organization: Not on file     Attends meetings of clubs or organizations: Not on file     Relationship status: Not on file    Intimate partner violence     Fear of current or ex partner: Not on file     Emotionally abused: Not on file     Physically abused: Not on file     Forced sexual activity: Not on file   Other Topics Concern    Not on file Social History Narrative    Retired farmer    Previously  wife passed away in 2006    He has 3 sons    Never in the [employer] not specified    [address]    Presently not driving    Unable to ambulate    Denies alcohol or tobacco consumption or substance usage       Allergies: Allergies   Allergen Reactions    Ketek [Telithromycin]        Home Meds:  Prior to Admission medications    Medication Sig Start Date End Date Taking?  Authorizing Provider   sertraline (ZOLOFT) 50 MG tablet Take 50 mg by mouth daily   Yes Historical Provider, MD   bumetanide (BUMEX) 0.5 MG tablet Take 0.5 mg by mouth daily   Yes Historical Provider, MD   meloxicam (MOBIC) 15 MG tablet Take 15 mg by mouth daily   Yes Historical Provider, MD   finasteride (PROSCAR) 5 MG tablet Take 1 tablet by mouth daily 2/27/20  Yes Fox Bray MD   tamsulosin [facility]) 0.4 MG capsule Take 1 capsule by mouth 2 times daily 2/26/20  Yes Fox Bray MD   fluticasone (FLONASE) 50 MCG/ACT nasal spray 2 sprays by Each Nostril route nightly as needed for Rhinitis   Yes Historical Provider, MD   loperamide (IMODIUM) 2 MG capsule Take 2 mg by mouth 4 times daily as needed for Diarrhea   Yes Historical Provider, MD   Melatonin 10 MG TABS Take 10 mg by mouth nightly as needed   Yes Historical Provider, MD   polyethylene glycol (MIRALAX) powder Take 17 g by mouth daily as needed (constipation)   Yes Historical Provider, MD   pantoprazole (PROTONIX) 20 MG tablet Take 20 mg by mouth daily   Yes Historical Provider, MD   tiotropium (SPIRIVA RESPIMAT) 1.25 MCG/ACT AERS inhaler Inhale 1 puff into the lungs daily   Yes Historical Provider, MD   calcium carbonate (TUMS) 500 MG chewable tablet Take 1 tablet by mouth daily as needed for Heartburn   Yes Historical Provider, MD   clopidogrel (PLAVIX) 75 MG tablet Take 75 mg by mouth daily   Yes Historical Provider, MD   vitamin B-12 (CYANOCOBALAMIN) 1000 MCG tablet Take 1,000 mcg by mouth daily   Yes Historical Provider, MD   Cholecalciferol (VITAMIN D3) 2000 units CAPS Take 2,000 Units by mouth daily   Yes Historical Provider, MD   Magnesium Chloride-Calcium  MG Take 1 tablet by mouth nightly   Yes Historical Provider, MD   pravastatin (PRAVACHOL) 20 MG tablet 20 mg nightly  10/14/11  Yes Historical Provider, MD   Ascorbic Acid (VITAMIN C) 500 MG tablet Take 500 mg by mouth 2 times daily    Yes Historical Provider, MD   carboxymethylcellulose 1 % ophthalmic solution 1 drop 2 times daily    Historical Provider, MD       Current Meds:   clopidogrel  75 mg Oral Daily    tamsulosin  0.4 mg Oral BID    sertraline  50 mg Oral Daily    pantoprazole  20 mg Oral Daily    sodium chloride flush  10 mL Intravenous 2 times per day    atorvastatin  80 mg Oral Nightly    [START ON 7/22/2020] aspirin  81 mg Oral Daily    nitroglycerin  0.5 inch Topical 4 times per day    ipratropium  0.5 mg Nebulization Q8H    [START ON 7/22/2020] metoprolol succinate  50 mg Oral Daily    isosorbide mononitrate  30 mg Oral Daily    metoprolol succinate  25 mg Oral Once       Current Infused Meds:   heparin (porcine) 10.26 Units/kg/hr (07/21/20 0920)       Physical Exam:  Vitals:    07/21/20 1400   BP: (!) 142/70   Pulse: 73   Resp: 18   Temp: 96.4 °F (35.8 °C)   SpO2: 92%       Intake/Output Summary (Last 24 hours) at 7/21/2020 1727  Last data filed at 7/21/2020 1403  Gross per 24 hour   Intake --   Output 1300 ml   Net -1300 ml     Estimated body mass index is 30.82 kg/m² as calculated from the following:    Height as of this encounter: 5' 10\" (1.778 m). Weight as of this encounter: 214 lb 12.8 oz (97.4 kg). Physical Exam  Vitals signs reviewed. Constitutional:       General: He is not in acute distress. Appearance: Normal appearance. He is well-developed and normal weight. He is not ill-appearing, toxic-appearing or diaphoretic. HENT:      Head: Normocephalic and atraumatic.       Nose: 07/20/20  2347      K 5.0      CO2 26   BUN 37*   CREATININE 1.7*   LABGLOM 38*   CALCIUM 8.8       CK, CKMB, Troponin: @LABRCNT (CKTOTAL:3, CKMB:3, TROPONINI:3)@    Last 3 BNP:  No results for input(s): BNP in the last 72 hours. IMAGING:  Xr Chest Portable    Result Date: 7/21/2020  XR CHEST PORTABLE 7/21/2020 7:19 AM History: Chest pain. Portable chest x-ray compared with 3/8/2020. The heart size is normal. The mediastinum is within normal limits. The lungs are adequately expanded with no pneumonia or pneumothorax. There is no significant pleural fluid. No congestive failure changes. 1. No acute disease. Signed by Dr Darylene Knuckles on 7/21/2020 7:19 AM      Assessment:  1. Complaints of arm pain and chest tightness new onset possible unstable angina troponin minimally elevated 0.04  2. 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 History of prostate cancer  4. Chronic kidney disease creatinine 1.7  5. Gastroesophageal reflux disease  6. Hypertension  7. History of pneumonia and sepsis  8. CT abdomen pelvis 2/21/2020 retention of fluid within the GI tract moderate bilateral hydronephrosis and hydroureter marked distention of the bladder follow-up ultrasound kidneys recommended to evaluate multiple exophytic lesions also noted fusiform infrarenal abdominal aortic aneurysm maximum diameter 4.3 cm also aneurysmal dilatation iliac arteries infiltrates lower lobes both lungs greater on the right suspicious for acute pneumonia mild anasarca evidence of generalized volume overload previous cholecystectomy fatty liver infiltration  9. CT of the head 2/21/2020 no acute findings moderate diffuse cortical atrophy with mild associated ventriculomegaly small chronic lacunar infarcts noted in the basal ganglia greater on the right  10.  CTA of the head 12/26/2014 subtle area of low attenuation right centrum semiovale likely due to subacute infarct atherosclerosis in the carotid siphons without evidence of significant intracranial stenosis  11. CTA of the neck 12/26/2014 mild stenosis proximal left internal carotid artery less than 50%  12. MRI of the brain 12/26/2014 demonstrating age-related changes no acute findings. Recommendations:  1. Serial EKGs and cardiac enzymes  2. Echocardiogram ordered  3. Increase Toprol-XL to 50 mg daily  4. Continue Plavix 75 mg daily indefinitely  5. Suggest Imdur 30 mg daily  6.  Poor candidate for invasive assessment given his advanced age renal insufficiency etc. will stress medical management for now further comments to follow

## 2020-07-21 NOTE — H&P
[facility] Medicine  History and Physical    Patient:  Katya Mendez  MRN: [id number]    CHIEF COMPLAINT:  \" I got to feel peculiar yesterday morning\"    History Obtained From: Patient  Family present for interview:     PCP: No primary care provider on file. HISTORY OF PRESENT ILLNESS:  80 y.o. male who presents with substernal chest pain that was nonradiating with associated shortness of breath. Positive cough but denies fever. In the emergency department his initial troponin was 0.04. ECG was not available for my review. His chest pain was relieved with medication given in the emergency department including sublingual nitroglycerin and aspirin. Patient is admitted to the medicine floor for further evaluation and treatment of unstable angina symptoms. REVIEW OF SYSTEMS:  Review of Systems  Patient is a poor historian, unable to complete 14 point review of systems. 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  Authorizing Provider   sertraline (ZOLOFT) 50 MG tablet Take 50 mg by mouth daily   Yes Historical Provider, MD   bumetanide (BUMEX) 0.5 MG tablet Take 0.5 mg by mouth daily   Yes Historical Provider, MD meloxicam (MOBIC) 15 MG tablet Take 15 mg by mouth daily   Yes Historical Provider, MD   finasteride (PROSCAR) 5 MG tablet Take 1 tablet by mouth daily 2/27/20  Yes Lidia Hagen MD   tamsulosin [facility]) 0.4 MG capsule Take 1 capsule by mouth 2 times daily 2/26/20  Yes Lidia Hagen MD   fluticasone (FLONASE) 50 MCG/ACT nasal spray 2 sprays by Each Nostril route nightly as needed for Rhinitis   Yes Historical Provider, MD   loperamide (IMODIUM) 2 MG capsule Take 2 mg by mouth 4 times daily as needed for Diarrhea   Yes Historical Provider, MD   Melatonin 10 MG TABS Take 10 mg by mouth nightly as needed   Yes Historical Provider, MD   polyethylene glycol (MIRALAX) powder Take 17 g by mouth daily as needed (constipation)   Yes Historical Provider, MD   pantoprazole (PROTONIX) 20 MG tablet Take 20 mg by mouth daily   Yes Historical Provider, MD   tiotropium (SPIRIVA RESPIMAT) 1.25 MCG/ACT AERS inhaler Inhale 1 puff into the lungs daily   Yes Historical Provider, MD   calcium carbonate (TUMS) 500 MG chewable tablet Take 1 tablet by mouth daily as needed for Heartburn   Yes Historical Provider, MD   clopidogrel (PLAVIX) 75 MG tablet Take 75 mg by mouth daily   Yes Historical Provider, MD   vitamin B-12 (CYANOCOBALAMIN) 1000 MCG tablet Take 1,000 mcg by mouth daily   Yes Historical Provider, MD   Cholecalciferol (VITAMIN D3) 2000 units CAPS Take 2,000 Units by mouth daily   Yes Historical Provider, MD   Magnesium Chloride-Calcium  MG Take 1 tablet by mouth nightly   Yes Historical Provider, MD   pravastatin (PRAVACHOL) 20 MG tablet 20 mg nightly  10/14/11  Yes Historical Provider, MD   Ascorbic Acid (VITAMIN C) 500 MG tablet Take 500 mg by mouth 2 times daily    Yes Historical Provider, MD   carboxymethylcellulose 1 % ophthalmic solution 1 drop 2 times daily    Historical Provider, MD       Allergies:  Ketek [telithromycin]    Social History:   TOBACCO:   reports that he has never smoked.  He has never used Recent Labs     20  2347      K 5.0      CO2 26   BUN 37*   CREATININE 1.7*   GLUCOSE 118*   CALCIUM 8.8   BILITOT 0.4   ALKPHOS 102   AST 17   ALT 17     Hepatic:   Recent Labs     20  2347   AST 17   ALT 17   BILITOT 0.4   ALKPHOS 102       Lactid Acid:  No results for input(s): LACTA in the last 72 hours. Procalcitonin:     Troponin T:   Recent Labs     20  2347   TROPONINI 0.04*     Pro-BNP: No results for input(s): BNP in the last 72 hours. Lipids: No results for input(s): CHOL, HDL in the last 72 hours. Invalid input(s): LDLCALCU  INR: No results for input(s): INR in the last 72 hours. A1c:Invalid input(s): HEMOGLOBIN A1C    SEPSIS Criteria:   Temp: Temp  Av.5 °F (36.4 °C)  Min: 96.6 °F (35.9 °C)  Max: 98.4 °F (36.9 °C)    HR Range: Pulse  Av.4  Min: 79  Max: 187   RR Range: Resp  Av  Min: 15  Max: 24   WBC:   Recent Labs     20  2300   WBC 8.9      Lactic acid: No results for input(s): LACTA in the last 72 hours. Creatinine:   Recent Labs     20  2347   CREATININE 1.7*      Troponin:   Recent Labs     20  2347   TROPONINI 0.04*       LFTs:   Recent Labs     20  2347   AST 17   ALT 17   BILITOT 0.4   ALKPHOS 102         -----------------------------------------------------------------    Imaging Studies:    XR CHEST PORTABLE   Final Result   1. No acute disease. Signed by Dr Emeli Alicea on 2020 7:19 AM          I have personally reviewed the following images:      EKG: I was unable to locate the ECG in the emergency department    Assessment     Active Problems:    NSTEMI (non-ST elevated myocardial infarction) [facility])    Chest pain  Resolved Problems:    * No resolved hospital problems. *      Plan     --Admit to the medicine service start aspirin, statin, oxygen, beta-blocker. Continue serial cardiac enzymes. Patient started on therapeutic heparin IV.   Likely medical treatment only due to advanced age and increased

## 2020-07-22 VITALS
SYSTOLIC BLOOD PRESSURE: 107 MMHG | TEMPERATURE: 95.9 F | DIASTOLIC BLOOD PRESSURE: 49 MMHG | RESPIRATION RATE: 20 BRPM | HEIGHT: 70 IN | OXYGEN SATURATION: 94 % | HEART RATE: 79 BPM | WEIGHT: 220.5 LBS | BODY MASS INDEX: 31.57 KG/M2

## 2020-07-22 PROBLEM — R07.9 CHEST PAIN: Status: RESOLVED | Noted: 2020-07-21 | Resolved: 2020-07-22

## 2020-07-22 LAB
ANION GAP SERPL CALCULATED.3IONS-SCNC: 11 MMOL/L (ref 7–19)
APTT: 58.6 SEC (ref 26–36.2)
BASOPHILS ABSOLUTE: 0 K/UL (ref 0–0.2)
BASOPHILS RELATIVE PERCENT: 0.4 % (ref 0–1)
BUN BLDV-MCNC: 32 MG/DL (ref 8–23)
CALCIUM SERPL-MCNC: 8.7 MG/DL (ref 8.2–9.6)
CHLORIDE BLD-SCNC: 101 MMOL/L (ref 98–111)
CHOLESTEROL, TOTAL: 119 MG/DL (ref 160–199)
CO2: 24 MMOL/L (ref 22–29)
CREAT SERPL-MCNC: 1.7 MG/DL (ref 0.5–1.2)
EKG P AXIS: NORMAL DEGREES
EKG P-R INTERVAL: NORMAL MS
EKG Q-T INTERVAL: 452 MS
EKG QRS DURATION: 88 MS
EKG QTC CALCULATION (BAZETT): 464 MS
EKG T AXIS: 26 DEGREES
EOSINOPHILS ABSOLUTE: 0.2 K/UL (ref 0–0.6)
EOSINOPHILS RELATIVE PERCENT: 2.8 % (ref 0–5)
GFR AFRICAN AMERICAN: 46
GFR NON-AFRICAN AMERICAN: 38
GLUCOSE BLD-MCNC: 96 MG/DL (ref 74–109)
HCT VFR BLD CALC: 34 % (ref 42–52)
HCT VFR BLD CALC: 34.4 % (ref 42–52)
HDLC SERPL-MCNC: 29 MG/DL (ref 55–121)
HEMOGLOBIN: 11.4 G/DL (ref 14–18)
HEMOGLOBIN: 11.5 G/DL (ref 14–18)
IMMATURE GRANULOCYTES #: 0 K/UL
LDL CHOLESTEROL CALCULATED: 65 MG/DL
LYMPHOCYTES ABSOLUTE: 1.4 K/UL (ref 1.1–4.5)
LYMPHOCYTES RELATIVE PERCENT: 17.1 % (ref 20–40)
MCH RBC QN AUTO: 31.8 PG (ref 27–31)
MCH RBC QN AUTO: 31.8 PG (ref 27–31)
MCHC RBC AUTO-ENTMCNC: 33.4 G/DL (ref 33–37)
MCHC RBC AUTO-ENTMCNC: 33.5 G/DL (ref 33–37)
MCV RBC AUTO: 95 FL (ref 80–94)
MCV RBC AUTO: 95 FL (ref 80–94)
MONOCYTES ABSOLUTE: 0.7 K/UL (ref 0–0.9)
MONOCYTES RELATIVE PERCENT: 8.5 % (ref 0–10)
NEUTROPHILS ABSOLUTE: 5.9 K/UL (ref 1.5–7.5)
NEUTROPHILS RELATIVE PERCENT: 70.8 % (ref 50–65)
PDW BLD-RTO: 15.1 % (ref 11.5–14.5)
PDW BLD-RTO: 15.1 % (ref 11.5–14.5)
PLATELET # BLD: 162 K/UL (ref 130–400)
PLATELET # BLD: 177 K/UL (ref 130–400)
PMV BLD AUTO: 9.4 FL (ref 9.4–12.4)
PMV BLD AUTO: 9.6 FL (ref 9.4–12.4)
POTASSIUM REFLEX MAGNESIUM: 5 MMOL/L (ref 3.5–5)
RBC # BLD: 3.58 M/UL (ref 4.7–6.1)
RBC # BLD: 3.62 M/UL (ref 4.7–6.1)
SARS-COV-2, PCR: NOT DETECTED
SODIUM BLD-SCNC: 136 MMOL/L (ref 136–145)
TRIGL SERPL-MCNC: 123 MG/DL (ref 0–149)
WBC # BLD: 7.7 K/UL (ref 4.8–10.8)
WBC # BLD: 8.4 K/UL (ref 4.8–10.8)

## 2020-07-22 PROCEDURE — 2700000000 HC OXYGEN THERAPY PER DAY

## 2020-07-22 PROCEDURE — 51702 INSERT TEMP BLADDER CATH: CPT

## 2020-07-22 PROCEDURE — 93005 ELECTROCARDIOGRAM TRACING: CPT | Performed by: HOSPITALIST

## 2020-07-22 PROCEDURE — 85730 THROMBOPLASTIN TIME PARTIAL: CPT

## 2020-07-22 PROCEDURE — 6370000000 HC RX 637 (ALT 250 FOR IP): Performed by: INTERNAL MEDICINE

## 2020-07-22 PROCEDURE — 6370000000 HC RX 637 (ALT 250 FOR IP): Performed by: HOSPITALIST

## 2020-07-22 PROCEDURE — 80048 BASIC METABOLIC PNL TOTAL CA: CPT

## 2020-07-22 PROCEDURE — 85025 COMPLETE CBC W/AUTO DIFF WBC: CPT

## 2020-07-22 PROCEDURE — 6360000002 HC RX W HCPCS: Performed by: HOSPITALIST

## 2020-07-22 PROCEDURE — U0003 INFECTIOUS AGENT DETECTION BY NUCLEIC ACID (DNA OR RNA); SEVERE ACUTE RESPIRATORY SYNDROME CORONAVIRUS 2 (SARS-COV-2) (CORONAVIRUS DISEASE [COVID-19]), AMPLIFIED PROBE TECHNIQUE, MAKING USE OF HIGH THROUGHPUT TECHNOLOGIES AS DESCRIBED BY CMS-2020-01-R: HCPCS

## 2020-07-22 PROCEDURE — 2580000003 HC RX 258: Performed by: HOSPITALIST

## 2020-07-22 PROCEDURE — 80061 LIPID PANEL: CPT

## 2020-07-22 PROCEDURE — 93010 ELECTROCARDIOGRAM REPORT: CPT | Performed by: INTERNAL MEDICINE

## 2020-07-22 PROCEDURE — 94640 AIRWAY INHALATION TREATMENT: CPT

## 2020-07-22 PROCEDURE — 85027 COMPLETE CBC AUTOMATED: CPT

## 2020-07-22 PROCEDURE — 99231 SBSQ HOSP IP/OBS SF/LOW 25: CPT | Performed by: INTERNAL MEDICINE

## 2020-07-22 RX ORDER — ASPIRIN 81 MG/1
81 TABLET, CHEWABLE ORAL DAILY
Qty: 30 TABLET | Refills: 3 | Status: SHIPPED | OUTPATIENT
Start: 2020-07-23

## 2020-07-22 RX ORDER — SODIUM POLYSTYRENE SULFONATE 15 G/60ML
15 SUSPENSION ORAL; RECTAL ONCE
Status: COMPLETED | OUTPATIENT
Start: 2020-07-22 | End: 2020-07-22

## 2020-07-22 RX ORDER — METOPROLOL SUCCINATE 50 MG/1
50 TABLET, EXTENDED RELEASE ORAL DAILY
Qty: 30 TABLET | Refills: 0 | Status: SHIPPED | OUTPATIENT
Start: 2020-07-23 | End: 2020-08-22

## 2020-07-22 RX ORDER — ISOSORBIDE MONONITRATE 30 MG/1
30 TABLET, EXTENDED RELEASE ORAL DAILY
Qty: 30 TABLET | Refills: 0 | Status: SHIPPED | OUTPATIENT
Start: 2020-07-23 | End: 2020-08-22

## 2020-07-22 RX ADMIN — IPRATROPIUM BROMIDE 0.5 MG: 0.5 SOLUTION RESPIRATORY (INHALATION) at 06:15

## 2020-07-22 RX ADMIN — ASPIRIN 81 MG: 81 TABLET, CHEWABLE ORAL at 08:46

## 2020-07-22 RX ADMIN — PANTOPRAZOLE SODIUM 20 MG: 20 TABLET, DELAYED RELEASE ORAL at 05:59

## 2020-07-22 RX ADMIN — ISOSORBIDE MONONITRATE 30 MG: 30 TABLET, EXTENDED RELEASE ORAL at 08:47

## 2020-07-22 RX ADMIN — SODIUM CHLORIDE, PRESERVATIVE FREE 10 ML: 5 INJECTION INTRAVENOUS at 20:53

## 2020-07-22 RX ADMIN — ATORVASTATIN CALCIUM 80 MG: 40 TABLET, FILM COATED ORAL at 20:52

## 2020-07-22 RX ADMIN — SODIUM POLYSTYRENE SULFONATE 15 G: 15 SUSPENSION ORAL; RECTAL at 18:07

## 2020-07-22 RX ADMIN — METOPROLOL SUCCINATE 50 MG: 50 TABLET, EXTENDED RELEASE ORAL at 08:47

## 2020-07-22 RX ADMIN — ACETAMINOPHEN 650 MG: 325 TABLET, FILM COATED ORAL at 18:12

## 2020-07-22 RX ADMIN — SERTRALINE HYDROCHLORIDE 50 MG: 50 TABLET ORAL at 08:47

## 2020-07-22 RX ADMIN — CLOPIDOGREL BISULFATE 75 MG: 75 TABLET ORAL at 08:46

## 2020-07-22 RX ADMIN — ACETAMINOPHEN 650 MG: 325 TABLET, FILM COATED ORAL at 10:33

## 2020-07-22 RX ADMIN — TAMSULOSIN HYDROCHLORIDE 0.4 MG: 0.4 CAPSULE ORAL at 20:52

## 2020-07-22 RX ADMIN — IPRATROPIUM BROMIDE 0.5 MG: 0.5 SOLUTION RESPIRATORY (INHALATION) at 14:14

## 2020-07-22 RX ADMIN — TAMSULOSIN HYDROCHLORIDE 0.4 MG: 0.4 CAPSULE ORAL at 08:47

## 2020-07-22 ASSESSMENT — PAIN DESCRIPTION - DESCRIPTORS: DESCRIPTORS: HEADACHE

## 2020-07-22 ASSESSMENT — PAIN SCALES - GENERAL
PAINLEVEL_OUTOF10: 0
PAINLEVEL_OUTOF10: 6
PAINLEVEL_OUTOF10: 6

## 2020-07-22 ASSESSMENT — PAIN DESCRIPTION - LOCATION: LOCATION: HEAD

## 2020-07-22 ASSESSMENT — PAIN DESCRIPTION - PAIN TYPE: TYPE: ACUTE PAIN

## 2020-07-22 NOTE — PLAN OF CARE
Problem: Falls - Risk of:  Goal: Will remain free from falls  Description: Will remain free from falls  7/22/2020 1122 by Angel Akhtar RN  Outcome: Ongoing  7/22/2020 0036 by Helen Kumar RN  Outcome: Ongoing     Problem: Skin Integrity:  Goal: Will show no infection signs and symptoms  Description: Will show no infection signs and symptoms  7/22/2020 1122 by Angel Akhtar RN  Outcome: Ongoing  7/22/2020 0036 by Helen Kumar RN  Outcome: Ongoing     Problem: Bleeding:  Goal: Will show no signs and symptoms of excessive bleeding  Description: Will show no signs and symptoms of excessive bleeding  7/22/2020 1122 by Angel Akhtar RN  Outcome: Ongoing  7/22/2020 0036 by Helen Kumar RN  Outcome: Ongoing

## 2020-07-22 NOTE — PROGRESS NOTES
Cardiology Daily Note Clarissa Villagomez MD      Patient:  Sulema Sandifer  [id number]    Patient Active Problem List    Diagnosis Date Noted    Chest pain 07/21/2020     Priority: Low    NSTEMI (non-ST elevated myocardial infarction) ([facility] 07/21/2020     Priority: Low    Elevated serum creatinine 07/21/2020     Priority: Low    CVA (cerebral vascular accident) ([facility] 03/11/2020     Priority: Low    Pneumonia 03/08/2020     Priority: Low    Severe sepsis ([facility] 03/07/2020     Priority: Low    Phimosis of penis 02/26/2020     Priority: Low    Palliative care patient 02/24/2020     Priority: Low    Hypovolemic shock ([facility] 02/21/2020     Priority: Low    Intractable vomiting with nausea 02/12/2019     Priority: Low    AGE (acute gastroenteritis) 02/12/2019     Priority: Low    LIU (acute kidney injury) ([facility] 02/12/2019     Priority: Low    Hyperkalemia 02/12/2019     Priority: Low    GERD (gastroesophageal reflux disease) 02/12/2019     Priority: Low    Essential hypertension 02/12/2019     Priority: Low    Leg pain, bilateral 10/17/2011     Priority: Low       Admit Date:  7/20/2020    Admission Problem List: Present on Admission:   Chest pain   NSTEMI (non-ST elevated myocardial infarction) ([facility]   Elevated serum creatinine      Cardiac Specific Data:  Specialty Problems        Cardiology Problems    Essential hypertension        CVA (cerebral vascular accident) ([facility]        Chest pain        NSTEMI (non-ST elevated myocardial infarction) ([facility]              Subjective:  Mr. Nicolette Cao seen today restless night but no further chest pain since admission. Vital signs stable. Cardiac rhythm stable. No other complaints.     Objective:   /61   Pulse 71   Temp 96.5 °F (35.8 °C) (Temporal)   Resp 18   Ht 5' 10\" (1.778 m)   Wt 220 lb 8 oz (100 kg)   SpO2 96%   BMI 31.64 kg/m²       Intake/Output Summary (Last 24 hours) at 7/22/2020 1217  Last data filed at 7/22/2020 0939  Gross per 24 hour   Intake 1306.5 ml   Output 2200 ml   Net -893.5 ml       Prior to Admission medications    Medication Sig Start Date End Date Taking?  Authorizing Provider   sertraline (ZOLOFT) 50 MG tablet Take 50 mg by mouth daily   Yes Historical Provider, MD   bumetanide (BUMEX) 0.5 MG tablet Take 0.5 mg by mouth daily   Yes Historical Provider, MD   meloxicam (MOBIC) 15 MG tablet Take 15 mg by mouth daily   Yes Historical Provider, MD   finasteride (PROSCAR) 5 MG tablet Take 1 tablet by mouth daily 2/27/20  Yes Kelli Delarosa MD   tamsulosin [facility]) 0.4 MG capsule Take 1 capsule by mouth 2 times daily 2/26/20  Yes Kelli Delarosa MD   fluticasone (FLONASE) 50 MCG/ACT nasal spray 2 sprays by Each Nostril route nightly as needed for Rhinitis   Yes Historical Provider, MD   loperamide (IMODIUM) 2 MG capsule Take 2 mg by mouth 4 times daily as needed for Diarrhea   Yes Historical Provider, MD   Melatonin 10 MG TABS Take 10 mg by mouth nightly as needed   Yes Historical Provider, MD   polyethylene glycol (MIRALAX) powder Take 17 g by mouth daily as needed (constipation)   Yes Historical Provider, MD   pantoprazole (PROTONIX) 20 MG tablet Take 20 mg by mouth daily   Yes Historical Provider, MD   tiotropium (SPIRIVA RESPIMAT) 1.25 MCG/ACT AERS inhaler Inhale 1 puff into the lungs daily   Yes Historical Provider, MD   calcium carbonate (TUMS) 500 MG chewable tablet Take 1 tablet by mouth daily as needed for Heartburn   Yes Historical Provider, MD   clopidogrel (PLAVIX) 75 MG tablet Take 75 mg by mouth daily   Yes Historical Provider, MD   vitamin B-12 (CYANOCOBALAMIN) 1000 MCG tablet Take 1,000 mcg by mouth daily   Yes Historical Provider, MD   Cholecalciferol (VITAMIN D3) 2000 units CAPS Take 2,000 Units by mouth daily   Yes Historical Provider, MD   Magnesium Chloride-Calcium  MG Take 1 tablet by mouth nightly   Yes Historical Provider, MD   pravastatin (PRAVACHOL) 20 MG tablet 20 mg nightly  10/14/11  Yes Historical Provider, MD expanded with no pneumonia or pneumothorax. There is no significant pleural fluid. No congestive failure changes. 1. No acute disease. Signed by Dr Rosalina Lorenzana on 7/21/2020 7:19 AM        Assessment:  1. Complaints of arm pain and chest tightness new onset possible unstable angina troponin minimally elevated 0.04  2. 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 History of prostate cancer  4. Chronic kidney disease creatinine 1.7  5. Gastroesophageal reflux disease  6. Hypertension  7. History of pneumonia and sepsis  8. CT abdomen pelvis 2/21/2020 retention of fluid within the GI tract moderate bilateral hydronephrosis and hydroureter marked distention of the bladder follow-up ultrasound kidneys recommended to evaluate multiple exophytic lesions also noted fusiform infrarenal abdominal aortic aneurysm maximum diameter 4.3 cm also aneurysmal dilatation iliac arteries infiltrates lower lobes both lungs greater on the right suspicious for acute pneumonia mild anasarca evidence of generalized volume overload previous cholecystectomy fatty liver infiltration  9. CT of the head 2/21/2020 no acute findings moderate diffuse cortical atrophy with mild associated ventriculomegaly small chronic lacunar infarcts noted in the basal ganglia greater on the right  10. CTA of the head 12/26/2014 subtle area of low attenuation right centrum semiovale likely due to subacute infarct atherosclerosis in the carotid siphons without evidence of significant intracranial stenosis  11. CTA of the neck 12/26/2014 mild stenosis proximal left internal carotid artery less than 50%  12. MRI of the brain 12/26/2014 demonstrating age-related changes no acute findings. Plan:  1.  Continue current treatment cardiac status stable could be potentially discharged today from my standpoint    Tiago Martinez MD 7/22/2020 12:17 PM

## 2020-07-22 NOTE — PROGRESS NOTES
1300: Spoke with patient's son and he explained that the patient was supposed to have his first appointment with Dr. David Rooney in March to follow up on a chronic pavon he had placed during his last admission. Patient was unable to make the appointment due to the lock down and had not had pavon changed since march. 1336: Spoke with Dr. David Rooney. Was told to change the pavon out with the same size he had currently in and irrigate. 1400: Current catheter removed and new pavon catheter placed with no resistance or blood. Irrigated with 40 cc and clear yellow urine returned.

## 2020-07-22 NOTE — DISCHARGE SUMMARY
Javier Allen  :  1925  MRN:  [id number]    Admit date:  2020  Discharge date:  2020       Admitting Physician:  No admitting provider for patient encounter. Advance Directive: DNR-CC    Consults Made:   IP CONSULT TO DIETITIAN  IP CONSULT TO CARDIOLOGY  PALLIATIVE CARE EVAL  IP CONSULT TO UROLOGY      Primary Care Physician:  No primary care provider on file. Discharge Diagnoses: Active Problems:    NSTEMI (non-ST elevated myocardial infarction) ([facility] [address] [address]    Elevated serum creatinine  Resolved Problems:    Chest pain            Pertinent Labs:  CBC with DIFF:  Recent Labs     20  2300 20  0647 20  0600 20  1450   WBC 8.9 8.0 7.7 8.4   RBC 4.22* 3.90* 3.58* 3.62*   HGB 13.5* 12.4* 11.4* 11.5*   HCT 39.6* 37.1* 34.0* 34.4*   MCV 93.8 95.1* 95.0* 95.0*   MCH 32.0* 31.8* 31.8* 31.8*   MCHC 34.1 33.4 33.5 33.4   RDW 14.8* 14.8* 15.1* 15.1*    164 162 177   MPV 10.0 9.4 9.4 9.6   NEUTOPHILPCT 60.9  --   --  70.8*   LYMPHOPCT 24.7  --   --  17.1*   MONOPCT 10.3*  --   --  8.5   EOSRELPCT 3.1  --   --  2.8   BASOPCT 0.5  --   --  0.4   NEUTROABS 5.4  --   --  5.9   LYMPHSABS 2.2  --   --  1.4   MONOSABS 0.90  --   --  0.70   EOSABS 0.30  --   --  0.20   BASOSABS 0.00  --   --  0.00       CMP/BMP:  Recent Labs     20  2347 20  1450    136   K 5.0 5.0    101   CO2 26 24   ANIONGAP 10 11   GLUCOSE 118* 96   BUN 37* 32*   CREATININE 1.7* 1.7*   LABGLOM 38* 38*   CALCIUM 8.8 8.7   PROT 6.3*  --    LABALBU 3.9  --    BILITOT 0.4  --    ALKPHOS 102  --    ALT 17  --    AST 17  --          CRP:  No results for input(s): CRP in the last 72 hours. Sed Rate:  No results for input(s): SEDRATE in the last 72 hours.       HgBA1c:  No components found for: HGBA1C  FLP:    Lab Results   Component Value Date    TRIG 123 2020    HDL 29 2020    LDLCALC 65 2020    LDLDIRECT 63 2015     TSH:    Lab Results   Component Value Date    TSH 5.80 Cannot estimate RVSP. Mild concentric left ventricular hypertrophy noted. Left ventricular size is mildly increased . Left ventricular ejection fraction is visually estimated at 40-45%. Impaired relaxation compatible with diastolic dysfunction. ( reversed E/A   ratio)   No evidence of left ventricular mass or thrombus noted. Dilation of aortic root . IVC normal.      Signature   ----------------------------------------------------------------   Electronically signed by Genevieve Santamaria MD(Interpreting   physician) on 07/21/2020 06:19 PM   ----------------------------------------------------------------            Hospital Course: As per Initial admission HPI 7/20/2020, quoted below;  \" 80 y.o. male who presents with substernal chest pain that was nonradiating with associated shortness of breath. Positive cough but denies fever. In the emergency department his initial troponin was 0.04. ECG was not available for my review. His chest pain was relieved with medication given in the emergency department including sublingual nitroglycerin and aspirin.     Patient is admitted to the medicine floor for further evaluation and treatment of unstable angina symptoms. \"      NSTEMI   - Admitted to PCU to r/o ACS    - Initial troponin 0.04 --> 0.03 --> 0.03   - Serial EKGs for chest pain   - Optimized medical management   --> Nitro glycerin sublingual when necessary for chest pain   - Continued to monitor on telemetry    -Followed by Cardiology    No further invasive work-up recommended by cardiology.  Recommend optimize medical management   Clopidogrel 75 mg p.o. daily   Isosorbide mononitrate 30 mg p.o. daily   Metoprolol XL 50 mg p.o. daily   Okay for discharge from cardiology standpoint.     History of CKD  · Stable        Physical Exam:  Vital Signs: /61   Pulse 71   Temp 96.5 °F (35.8 °C) (Temporal)   Resp 20   Ht 5' 10\" (1.778 m)   Wt 220 lb 8 oz (100 kg)   SpO2 97%   BMI 31.64 kg/m² Physical Exam  General appearance: alert, appears stated age and cooperative  HEENT: Head: Normocephalic, no lesions, without obvious abnormality. Neck: no carotid bruit, no JVD and supple, symmetrical, trachea midline  Lungs: Patient in no acute respiratory distress, No increased work of breathing clear to auscultation bilaterally  Heart: regular rate and rhythm, S1, S2 normal, no murmur, click, rub or gallop  Abdomen: soft, non-tender; bowel sounds normal; no masses,  no organomegaly  Extremities: extremities normal, atraumatic, no cyanosis or edema   2+ pulses in bilateral lower extremities  Neurologic: Mental status: Alert, oriented, thought content appropriate  Skin: Skin Warm, dry with , color, texture, turgor normal. No rashes or lesions or nodules.      Discharge Medications:       Neetal Neo   [place] Medication Instructions PDW:[id number]    Printed on:07/22/20 1060   Medication Information                      Ascorbic Acid (VITAMIN C) 500 MG tablet  Take 500 mg by mouth 2 times daily              aspirin 81 MG chewable tablet  Take 1 tablet by mouth daily             bumetanide (BUMEX) 0.5 MG tablet  Take 0.5 mg by mouth daily             calcium carbonate (TUMS) 500 MG chewable tablet  Take 1 tablet by mouth daily as needed for Heartburn             carboxymethylcellulose 1 % ophthalmic solution  1 drop 2 times daily             Cholecalciferol (VITAMIN D3) 2000 units CAPS  Take 2,000 Units by mouth daily             clopidogrel (PLAVIX) 75 MG tablet  Take 75 mg by mouth daily             finasteride (PROSCAR) 5 MG tablet  Take 1 tablet by mouth daily             fluticasone (FLONASE) 50 MCG/ACT nasal spray  2 sprays by Each Nostril route nightly as needed for Rhinitis             isosorbide mononitrate (IMDUR) 30 MG extended release tablet  Take 1 tablet by mouth daily             loperamide (IMODIUM) 2 MG capsule  Take 2 mg by mouth 4 times daily as needed for Diarrhea             Magnesium Chloride-Calcium  MG  Take 1 tablet by mouth nightly             Melatonin 10 MG TABS  Take 10 mg by mouth nightly as needed             metoprolol succinate (TOPROL XL) 50 MG extended release tablet  Take 1 tablet by mouth daily             pantoprazole (PROTONIX) 20 MG tablet  Take 20 mg by mouth daily             polyethylene glycol (MIRALAX) powder  Take 17 g by mouth daily as needed (constipation)             pravastatin (PRAVACHOL) 20 MG tablet  20 mg nightly              sertraline (ZOLOFT) 50 MG tablet  Take 50 mg by mouth daily             tamsulosin (FLOMAX) 0.4 MG capsule  Take 1 capsule by mouth 2 times daily             tiotropium (SPIRIVA RESPIMAT) 1.25 MCG/ACT AERS inhaler  Inhale 1 puff into the lungs daily             vitamin B-12 (CYANOCOBALAMIN) 1000 MCG tablet  Take 1,000 mcg by mouth daily                 Discharge Instructions: Follow up with No primary care provider on file. in 7 days. Take medications as directed. Resume activity as tolerated. Diet: DIET CARDIAC;        DISCHARGE STATUS:    Condition: Good  Disposition: Patient is medically stable and will be discharged SNF    Extended Emergency Contact Information  Primary Emergency Contact: [address] Phone: [phone]  Relation: Child  Secondary Emergency Contact: [address] [facility], [address] [place] [address] Phone: [phone]  Mobile Phone: [phone]  Relation: Child       Time Spent on discharge is more than 34 mins in the examination, evaluation, counseling and review of medications and discharge plan. Electronically signed by   Jennifer Jimenez MD, MPH,   Internal Medicine Hospitalist   7/22/2020 4:27 PM      Thank you No primary care provider on file. for the opportunity to be involved in this patient's care.  If you have any questions or concerns please feel free to contact me at [phone]        EMR Dragon/Transcription disclaimer:   Much of this encounter note is an electronic transcription/translation of spoken language to printed text.  The electronic translation of spoken language may permit erroneous, or at times, nonsensical words or phrases to be inadvertently transcribed; although attempts have made to review the note for such errors, some may still exist.

## 2020-07-22 NOTE — ACP (ADVANCE CARE PLANNING)
Advance Care Planning     Advance Care Planning Activator (Inpatient)  Conversation Note      Date of ACP Conversation: 7/22/20    [employer] with: Patient with decision making capacity. ACP Activator: [place] BALTAZARSaige Cota Decision Maker:     Current Designated Health Care Decision Maker:   Primary Decision Maker: Abdulaziz Braun [facility] - [phone]    Secondary Decision Maker: ANNA Ruffin [phone]  Care Preferences    Ventilation: \"If you were in your present state of health and suddenly became very ill and were unable to breathe on your own, what would your preference be about the use of a ventilator (breathing machine) if it were available to you? \"      Would the patient desire the use of ventilator (breathing machine)?: No    \"If your health worsens and it becomes clear that your chance of recovery is unlikely, what would your preference be about the use of a ventilator (breathing machine) if it were available to you? \"     Would the patient desire the use of ventilator (breathing machine)?: No      Resuscitation  \"In the event your heart stopped as a result of an underlying serious health condition, would you want attempts to be made to restart your heart (answer \"yes\" for attempt to resuscitate) or would you prefer a natural death (answer \"no\" for do not attempt to resuscitate)? \" No      Conversation Outcomes:  [x] ACP discussion completed  [] Existing advance directive reviewed with patient; no changes to patient's previously recorded wishes  [] New Advance Directive completed  [] Portable Do Not Rescitate prepared for Provider review and signature  [] POLST/POST/MOLST/MOST prepared for Provider review and signature      Follow-up plan:    [] Schedule follow-up conversation to continue planning  [] Referred individual to Provider for additional questions/concerns   [] Advised patient/agent/surrogate to review completed ACP document and update if needed with changes in condition, patient preferences or care setting    [] This note routed to one or more involved healthcare providers

## 2020-07-22 NOTE — CONSULTS
Palliative Care:    Mr. Nicolette Cao is a very pleasant 80 yr old. He is here d/t stating he \"felt peculiar\" yesterday morning. Per MD note, NSTEMI. Pt states he feels good this morning. Niece is at the bedside. Pt known to palliative care team.    Past Medical History:        Past Medical History:   Diagnosis Date    [facility]     Admitted with pneumonia     Cancer ([facility]     prostate    Cerebral artery occlusion with cerebral infarction ([facility]     Depression     GERD (gastroesophageal reflux disease)     Hemiplegia and hemiparesis following cerebral infarction affecting left dominant side ([facility]     Hyperlipidemia     Hypertension     Palliative care patient 02/24/2020    TIA (transient ischemic attack)     Vitamin D deficiency        Advance Directives:    Pt states he is a DNR-CC. Tells me he would not want vent or CPR. See ACP note. Pain/Other Symptoms:    Denies    Activity:  Assist. Pt unable to use lower extremities. Pt tells me he is up in lift chair at facility during the day and in bed throughout the night. Pt gets up with use of lift/staff. Psychological/Spiritual:    Good family and spiritual support. Plan:  DC to [facility] once COVID test results are back. Patient/family discussion r/t goals:  Pt goal to return to his [facility] where he is a resident. Only concern pt/niece have is pt catheter. They are not sure if this needs to be changed. Pt tells me it was placed in March. Niece states by Dr. Conrado Acosta. Pt does not know when cath was last changed. Family tells me pt has missed last 2 appts with Dr. Conrado Acosta d/t COVID pandemic. Encouraged niece to call [facility] and ask if they know about catheter changes. She did this and [facility] will return her call. Pt has hx of prostate cancer. Pt nurse is aware and will follow up. Pt is ready to return to facility. States he likes it there.               Electronically signed by Idalmis Yuan RN on 7/22/2020 at 11:46 AM

## 2020-07-23 NOTE — PROGRESS NOTES
This nurse received a call from tele room asking if a pt from this floor was being discharged because she had seen a pt on ambulance gurney leave the floor with EMS personnel. This nurse went to check pt in room and pt was gone as well as paper work from nurses station. No staff member assisted in d/c pt with EMS as no one was aware they were here and ready to  pt. Called [facility]ang Cho to inform pt was picked up.  Electronically signed by Tor Officer, RN on 7/22/2020 at 11:56 PM

## 2020-07-25 ASSESSMENT — ENCOUNTER SYMPTOMS
VOMITING: 0
COLOR CHANGE: 0
BACK PAIN: 0
CHEST TIGHTNESS: 1
RHINORRHEA: 0
EYE DISCHARGE: 0
ABDOMINAL PAIN: 0
NAUSEA: 0
SHORTNESS OF BREATH: 1
COUGH: 1

## 2020-07-26 NOTE — ED PROVIDER NOTES
[facility]  eMERGENCY dEPARTMENT eNCOUnter      Pt Name: Ahmet Ortiz  MRN: [id number]  [place] 7/20/1925  Date of evaluation: 7/20/2020  Provider: Flavia Mcghee MD    [address] [place] [address]       Chief Complaint   Patient presents with    Chest Pain         HISTORY OF PRESENT ILLNESS   (Location/Symptom, Timing/Onset,Context/Setting, Quality, Duration, Modifying Factors, Severity)  Note limiting factors. Ahmet Ortiz is a 80 y.o. male who presents to the emergency department with chest pain. Patient states the pain feels \"tight. \"  Pain began this evening. Patient points to mid chest as location. Patient states that it radiates down into his right arm. Patient  identifies exertion as p making his chest \"more tight. \"  Patient states that resting improves the pain. Patient identifies associated symptoms of \"a little shortness of breath. \"  Patient states that he is also been coughing and \"spitting up stuff in my lungs. \"  Patient denies diaphoresis, nausea, vomiting, palpitations, passing out, lower extremity pain or swelling. Patient has a history of stroke, TIA, and COPD. HPI    NursingNotes were reviewed. REVIEW OF SYSTEMS    (2-9 systems for level 4, 10 or more for level 5)     Review of Systems   Constitutional: Negative for chills and fever. HENT: Negative for congestion and rhinorrhea. Eyes: Negative for discharge. Respiratory: Positive for cough, chest tightness and shortness of breath. Cardiovascular: Negative for chest pain and palpitations. Gastrointestinal: Negative for abdominal pain, nausea and vomiting. Genitourinary: Negative for difficulty urinating and dysuria. Musculoskeletal: Negative for back pain and neck pain. Skin: Negative for color change and pallor. Neurological: Negative for syncope and light-headedness. Psychiatric/Behavioral: Negative for agitation and confusion. All other systems reviewed and are negative.            PAST MEDICALHISTORY Past Medical History:   Diagnosis Date    [facility] failure ([facility]     Admitted with pneumonia     Cancer ([facility] [facility]     prostate    Cerebral artery occlusion with cerebral infarction ([facility] [facility]     Depression     GERD (gastroesophageal reflux disease)     Hemiplegia and hemiparesis following cerebral infarction affecting left dominant side ([facility] [facility]     Hyperlipidemia     Hypertension     Palliative care patient 02/24/2020    TIA (transient ischemic attack)     Vitamin D deficiency          SURGICAL HISTORY       Past Surgical History:   Procedure Laterality Date    ANKLE FUSION      APPENDECTOMY      CHOLECYSTECTOMY      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    KNEE ARTHROPLASTY      bilateral         CURRENT MEDICATIONS     Discharge Medication List as of 7/22/2020  5:14 PM      CONTINUE these medications which have NOT CHANGED    Details   sertraline (ZOLOFT) 50 MG tablet Take 50 mg by mouth dailyHistorical Med      bumetanide (BUMEX) 0.5 MG tablet Take 0.5 mg by mouth dailyHistorical Med      finasteride (PROSCAR) 5 MG tablet Take 1 tablet by mouth daily, Disp-30 tablet, R-3Print      tamsulosin (FLOMAX) 0.4 MG capsule Take 1 capsule by mouth 2 times daily, Disp-30 capsule, R-3Print      fluticasone (FLONASE) 50 MCG/ACT nasal spray 2 sprays by Each Nostril route nightly as needed for RhinitisHistorical Med      loperamide (IMODIUM) 2 MG capsule Take 2 mg by mouth 4 times daily as needed for DiarrheaHistorical Med      Melatonin 10 MG TABS Take 10 mg by mouth nightly as neededHistorical Med      polyethylene glycol (MIRALAX) powder Take 17 g by mouth daily as needed (constipation)Historical Med      pantoprazole (PROTONIX) 20 MG tablet Take 20 mg by mouth dailyHistorical Med      tiotropium (SPIRIVA RESPIMAT) 1.25 MCG/ACT AERS inhaler Inhale 1 puff into the lungs dailyHistorical Med      calcium carbonate (TUMS) 500 MG chewable tablet Take 1 tablet by mouth daily as needed for HeartburnHistorical Med      clopidogrel (PLAVIX) 75 MG tablet Take 75 mg by mouth dailyHistorical Med      vitamin B-12 (CYANOCOBALAMIN) 1000 MCG tablet Take 1,000 mcg by mouth dailyHistorical Med      Cholecalciferol (VITAMIN D3) 2000 units CAPS Take 2,000 Units by mouth dailyHistorical Med      Magnesium Chloride-Calcium  MG Take 1 tablet by mouth nightlyHistorical Med      pravastatin (PRAVACHOL) 20 MG tablet 20 mg nightly Historical Med      Ascorbic Acid (VITAMIN C) 500 MG tablet Take 500 mg by mouth 2 times daily Historical Med      carboxymethylcellulose 1 % ophthalmic solution 1 drop 2 times dailyHistorical Med             ALLERGIES     Ketek [telithromycin]    FAMILY HISTORY       Family History   Problem Relation Age of Onset    Diabetes Mother     Heart Disease Mother     Heart Attack Mother     Other Mother         cerebral hemorrhage    Diabetes Father     Other Other         all 5 siblings have diabetes    Other Sister         rheumatic fever          SOCIAL HISTORY       Social History     Socioeconomic History    Marital status:      Spouse name: None    Number of children: None    Years of education: None    Highest education level: None   Occupational History    None   Social Needs    Financial resource strain: None    Food insecurity     Worry: None     Inability: None    Transportation needs     Medical: None     Non-medical: None   Tobacco Use    Smoking status: Never Smoker    Smokeless tobacco: Never Used   Substance and Sexual Activity    Alcohol use: No    Drug use: No    Sexual activity: None   Lifestyle    Physical activity     Days per week: None     Minutes per session: None    Stress: None   Relationships    Social connections     Talks on phone: None     Gets together: None     Attends Shinto service: None     Active member of club or organization: None     Attends meetings of clubs or organizations: None     Relationship status: None    no guarding. Musculoskeletal:         General: No tenderness or deformity. Right lower leg: Edema present. Left lower leg: Edema present. Comments: Patient has swelling over the right second and third digits left hand. Skin:     General: Skin is warm and dry. Capillary Refill: Capillary refill takes less than 2 seconds. Coloration: Skin is not jaundiced. Neurological:      General: No focal deficit present. Mental Status: He is alert and oriented to person, place, and time. Mental status is at baseline. Motor: Weakness (generalized) present. Coordination: Coordination normal.   Psychiatric:         Mood and Affect: Mood normal.         Behavior: Behavior normal.         DIAGNOSTIC RESULTS     EKG: All EKG's areinterpreted by the Emergency Department Physician who either signs or Co-signs this chart in the absence of a cardiologist.    EKG dated 7/20/2020 at 20 3:22 PM: Normal sinus rhythm, rate 84. PACs. ND intervals 270. Borderline left axis deviation. Poor R wave progression. T wave flattening in multiple leads. RADIOLOGY:  Non-plain film images such as CT, Ultrasound and MRI are read by the radiologist. Plain radiographic images are visualized and preliminarily interpreted bythe emergency physician with the below findings:    XR CHEST PORTABLE   Final Result   1. No acute disease.    Signed by Dr Gaurang Cook on 7/21/2020 7:19 AM              LABS:  Labs Reviewed   CBC WITH AUTO DIFFERENTIAL - Abnormal; Notable for the following components:       Result Value    RBC 4.22 (*)     Hemoglobin 13.5 (*)     Hematocrit 39.6 (*)     MCH 32.0 (*)     RDW 14.8 (*)     Monocytes % 10.3 (*)     All other components within normal limits   COMPREHENSIVE METABOLIC PANEL - Abnormal; Notable for the following components:    Glucose 118 (*)     BUN 37 (*)     CREATININE 1.7 (*)     GFR Non- 38 (*)     GFR  46 (*)     Total Protein 6.3 (*) All other components within normal limits   TROPONIN - Abnormal; Notable for the following components:    Troponin 0.04 (*)     All other components within normal limits   CBC - Abnormal; Notable for the following components:    RBC 3.90 (*)     Hemoglobin 12.4 (*)     Hematocrit 37.1 (*)     MCV 95.1 (*)     MCH 31.8 (*)     RDW 14.8 (*)     All other components within normal limits    Narrative:     Collection has been rescheduled by [facility] at 07/21/2020 06:05 Reason:   Failed attempt at venipuncture   APTT - Abnormal; Notable for the following components:    aPTT 122.7 (*)     All other components within normal limits    Narrative:     CALL  Dmitry GARCIA tel. ,  Coag results called to and read back by Joseluis Valdes rn [facility], 07/21/2020  23:59, by [facility]   CBC - Abnormal; Notable for the following components:    RBC 3.58 (*)     Hemoglobin 11.4 (*)     Hematocrit 34.0 (*)     MCV 95.0 (*)     MCH 31.8 (*)     RDW 15.1 (*)     All other components within normal limits   LIPID PANEL - Abnormal; Notable for the following components:    Cholesterol, Total 119 (*)     HDL 29 (*)     All other components within normal limits   APTT - Abnormal; Notable for the following components:    aPTT 58.6 (*)     All other components within normal limits   CBC WITH AUTO DIFFERENTIAL - Abnormal; Notable for the following components:    RBC 3.62 (*)     Hemoglobin 11.5 (*)     Hematocrit 34.4 (*)     MCV 95.0 (*)     MCH 31.8 (*)     RDW 15.1 (*)     Neutrophils % 70.8 (*)     Lymphocytes % 17.1 (*)     All other components within normal limits   BASIC METABOLIC PANEL W/ REFLEX TO MG FOR LOW K - Abnormal; Notable for the following components:    BUN 32 (*)     CREATININE 1.7 (*)     GFR Non- 38 (*)     GFR  46 (*)     All other components within normal limits   SPECIMEN REJECTION   BRAIN NATRIURETIC PEPTIDE   TROPONIN    Narrative:     Collection has been rescheduled by [facility] at 07/21/2020 06:05 Reason:   Failed attempt at venipuncture   TROPONIN    Narrative:     Collection has been rescheduled by [facility] at 07/21/2020 08:51 Reason:   Failed attempt at venipuncture   APTT    Narrative:     Collection has been rescheduled by [facility] at 07/21/2020 06:05 Reason:   Failed attempt at venipuncture   COVID-19   D-DIMER, QUANTITATIVE       All other labs were within normal range or not returned as of this dictation. EMERGENCY DEPARTMENT COURSE and DIFFERENTIAL DIAGNOSIS/MDM:   Vitals:    Vitals:    07/22/20 1053 07/22/20 1415 07/22/20 1854 07/22/20 1855   BP: 114/61  (!) 107/49 (!) 107/49   Pulse: 71  78 79   Resp: 18 20 20 20   Temp: 96.5 °F (35.8 °C)  95.9 °F (35.5 °C) 95.9 °F (35.5 °C)   TempSrc: Temporal  Temporal    SpO2: 96% 97% 94% 94%   Weight:       Height:           MDM  80year-old male presents with chest pain. Lab, EKG, and radiology results reviewed. Discussed with hospitalist who will admit patient for further evaluation and treatment. CONSULTS:  IP CONSULT TO DIETITIAN  IP CONSULT TO CARDIOLOGY  PALLIATIVE CARE EVAL    PROCEDURES:  Unless otherwise noted below, none     Procedures    FINAL IMPRESSION      1.  Chest pain, unspecified type          DISPOSITION/PLAN   DISPOSITION Admitted 07/21/2020 03:50:27 AM        (Please note that portions of this note were completed with a voice recognition program.  Efforts were made to edit thedictations but occasionally words are mis-transcribed.)    Karina Guardado MD (electronically signed)  Attending Emergency Physician          Karina Guardado MD  07/25/20 9225

## 2020-08-07 ENCOUNTER — HOSPITAL ENCOUNTER (EMERGENCY)
Age: 85
Discharge: NURSING, REHAB OR LONG-TERM CARE | End: 2020-08-08
Attending: EMERGENCY MEDICINE
Payer: MEDICARE

## 2020-08-07 PROCEDURE — 99283 EMERGENCY DEPT VISIT LOW MDM: CPT

## 2020-08-07 RX ORDER — ONDANSETRON 4 MG/1
4 TABLET, FILM COATED ORAL EVERY 8 HOURS PRN
COMMUNITY

## 2020-08-07 RX ORDER — ACETAMINOPHEN 325 MG/1
650 TABLET ORAL EVERY 6 HOURS PRN
COMMUNITY

## 2020-08-07 RX ORDER — NITROGLYCERIN 0.4 MG/1
0.4 TABLET SUBLINGUAL EVERY 5 MIN PRN
COMMUNITY

## 2020-08-07 RX ORDER — ALBUTEROL SULFATE 90 UG/1
2 AEROSOL, METERED RESPIRATORY (INHALATION) EVERY 6 HOURS PRN
COMMUNITY

## 2020-08-07 RX ORDER — ACYCLOVIR 50 MG/G
OINTMENT TOPICAL 2 TIMES DAILY
COMMUNITY

## 2020-08-08 VITALS
BODY MASS INDEX: 31.92 KG/M2 | TEMPERATURE: 96.9 F | OXYGEN SATURATION: 95 % | DIASTOLIC BLOOD PRESSURE: 50 MMHG | RESPIRATION RATE: 20 BRPM | HEART RATE: 77 BPM | SYSTOLIC BLOOD PRESSURE: 106 MMHG | WEIGHT: 223 LBS | HEIGHT: 70 IN

## 2020-08-08 LAB
ALBUMIN SERPL-MCNC: 3.7 G/DL (ref 3.5–5.2)
ALP BLD-CCNC: 5 U/L (ref 40–130)
ALT SERPL-CCNC: 16 U/L (ref 5–41)
ANION GAP SERPL CALCULATED.3IONS-SCNC: 12 MMOL/L (ref 7–19)
AST SERPL-CCNC: 15 U/L (ref 5–40)
BASOPHILS ABSOLUTE: 0 K/UL (ref 0–0.2)
BASOPHILS RELATIVE PERCENT: 0.2 % (ref 0–1)
BILIRUB SERPL-MCNC: 0.8 MG/DL (ref 0.2–1.2)
BUN BLDV-MCNC: 32 MG/DL (ref 8–23)
CALCIUM SERPL-MCNC: 8.9 MG/DL (ref 8.2–9.6)
CHLORIDE BLD-SCNC: 98 MMOL/L (ref 98–111)
CO2: 23 MMOL/L (ref 22–29)
CREAT SERPL-MCNC: 1.5 MG/DL (ref 0.5–1.2)
EOSINOPHILS ABSOLUTE: 0 K/UL (ref 0–0.6)
EOSINOPHILS RELATIVE PERCENT: 0.4 % (ref 0–5)
GFR AFRICAN AMERICAN: 53
GFR NON-AFRICAN AMERICAN: 43
GLUCOSE BLD-MCNC: 123 MG/DL (ref 74–109)
HCT VFR BLD CALC: 34.9 % (ref 42–52)
HEMOGLOBIN: 11.9 G/DL (ref 14–18)
IMMATURE GRANULOCYTES #: 0 K/UL
LIPASE: 38 U/L (ref 13–60)
LYMPHOCYTES ABSOLUTE: 0.6 K/UL (ref 1.1–4.5)
LYMPHOCYTES RELATIVE PERCENT: 5.8 % (ref 20–40)
MCH RBC QN AUTO: 31.6 PG (ref 27–31)
MCHC RBC AUTO-ENTMCNC: 34.1 G/DL (ref 33–37)
MCV RBC AUTO: 92.8 FL (ref 80–94)
MONOCYTES ABSOLUTE: 0.6 K/UL (ref 0–0.9)
MONOCYTES RELATIVE PERCENT: 5.6 % (ref 0–10)
NEUTROPHILS ABSOLUTE: 8.8 K/UL (ref 1.5–7.5)
NEUTROPHILS RELATIVE PERCENT: 87.6 % (ref 50–65)
PDW BLD-RTO: 14.7 % (ref 11.5–14.5)
PLATELET # BLD: 177 K/UL (ref 130–400)
PMV BLD AUTO: 10 FL (ref 9.4–12.4)
POTASSIUM SERPL-SCNC: 4.9 MMOL/L (ref 3.5–5)
RBC # BLD: 3.76 M/UL (ref 4.7–6.1)
SODIUM BLD-SCNC: 133 MMOL/L (ref 136–145)
TOTAL PROTEIN: 6.4 G/DL (ref 6.6–8.7)
WBC # BLD: 10 K/UL (ref 4.8–10.8)

## 2020-08-08 PROCEDURE — 83690 ASSAY OF LIPASE: CPT

## 2020-08-08 PROCEDURE — 80053 COMPREHEN METABOLIC PANEL: CPT

## 2020-08-08 PROCEDURE — 85025 COMPLETE CBC W/AUTO DIFF WBC: CPT

## 2020-08-08 PROCEDURE — 36415 COLL VENOUS BLD VENIPUNCTURE: CPT

## 2020-08-08 NOTE — ED NOTES
[facility] unable to transport patient at this time, request for us to call [facility] called for transport, face sheet faxed       Joseluis Bell RN  08/08/20 6845

## 2020-08-08 NOTE — ED PROVIDER NOTES
[facility]  eMERGENCY dEPARTMENT eNCOUnter      Pt Name: Diane Helms  MRN: [id number]  [place] 7/20/1925  Date of evaluation: 8/7/2020  Provider: Sasha Sandoval MD    [address] [place] [address]       Chief Complaint   Patient presents with    Emesis     x2 over the last few hours. lives at [facility]. HISTORY OF PRESENT ILLNESS   (Location/Symptom, Timing/Onset,Context/Setting, Quality, Duration, Modifying Factors, Severity)  Note limiting factors. Diane Helms is a 80 y.o. male who presents to the emergency department via EMS for evaluation of episodes of vomiting. Patient resides at a nursing home and stated that he had a couple of bouts of vomiting over the last several hours. Upon arrival to the ED he really does not seem to have any acute complaints. Denies any abdominal pain or episodes of diarrhea that he can recall. He describes his nausea as mild in severity and without relieving factors. He does not recall having a fever or experiencing any chills. The symptoms are described as mild in severity. HPI    NursingNotes were reviewed. REVIEW OF SYSTEMS    (2-9 systems for level 4, 10 or more for level 5)     Review of Systems   Constitutional: Negative for chills and fever. Respiratory: Negative for shortness of breath. Gastrointestinal: Positive for nausea and vomiting. Negative for abdominal distention, abdominal pain and diarrhea. Genitourinary: Negative for dysuria and flank pain. Neurological: Negative for headaches. All other systems reviewed and are negative.            PAST MEDICALHISTORY     Past Medical History:   Diagnosis Date    Acute kidney failure ([address]     Admitted with pneumonia     Cancer ([address]     prostate    Cerebral artery occlusion with cerebral infarction ([address]     Depression     GERD (gastroesophageal reflux disease)     Hemiplegia and hemiparesis following cerebral infarction affecting left dominant side (HCC)     Hyperlipidemia     Hypertension     Palliative care patient 02/24/2020    TIA (transient ischemic attack)     Vitamin D deficiency          SURGICAL HISTORY       Past Surgical History:   Procedure Laterality Date    ANKLE FUSION      APPENDECTOMY      CHOLECYSTECTOMY      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    KNEE ARTHROPLASTY      bilateral         CURRENT MEDICATIONS     Discharge Medication List as of 8/8/2020  2:39 AM      CONTINUE these medications which have NOT CHANGED    Details   acetaminophen (TYLENOL) 325 MG tablet Take 650 mg by mouth every 6 hours as needed for PainHistorical Med      EMOLLIENT EX Apply topically 2 times daily Apply to arms and legsHistorical Med      nitroGLYCERIN (NITROSTAT) 0.4 MG SL tablet Place 0.4 mg under the tongue every 5 minutes as needed for Chest pain up to max of 3 total doses. If no relief after 1 dose, call 911. Historical Med      albuterol sulfate HFA (VENTOLIN HFA) 108 (90 Base) MCG/ACT inhaler Inhale 2 puffs into the lungs every 6 hours as needed for WheezingHistorical Med      ondansetron (ZOFRAN) 4 MG tablet Take 4 mg by mouth every 8 hours as needed for Nausea or VomitingHistorical Med      acyclovir (ZOVIRAX) 5 % ointment Apply topically 2 times daily To lip., Topical, 2 TIMES DAILY, Historical Med      aspirin 81 MG chewable tablet Take 1 tablet by mouth daily, Disp-30 tablet,R-3Normal      isosorbide mononitrate (IMDUR) 30 MG extended release tablet Take 1 tablet by mouth daily, Disp-30 tablet,R-0Normal      metoprolol succinate (TOPROL XL) 50 MG extended release tablet Take 1 tablet by mouth daily, Disp-30 tablet,R-0Normal      sertraline (ZOLOFT) 50 MG tablet Take 50 mg by mouth dailyHistorical Med      bumetanide (BUMEX) 0.5 MG tablet Take 0.5 mg by mouth dailyHistorical Med      finasteride (PROSCAR) 5 MG tablet Take 1 tablet by mouth daily, Disp-30 tablet, R-3Print      tamsulosin (FLOMAX) 0.4 MG capsule Take 1 capsule by mouth 2 times daily, Normal heart sounds. No murmur. Pulmonary:      Effort: Pulmonary effort is normal. No respiratory distress. Breath sounds: Normal breath sounds. No stridor. Abdominal:      General: There is no distension. Palpations: Abdomen is soft. Tenderness: There is no abdominal tenderness. There is no guarding. Musculoskeletal:      Right lower leg: No edema. Left lower leg: No edema. Skin:     Capillary Refill: Capillary refill takes less than 2 seconds. Coloration: Skin is not pale. Findings: No rash. Neurological:      Mental Status: He is alert and oriented to person, place, and time. Psychiatric:         Behavior: Behavior is cooperative. DIAGNOSTIC RESULTS       LABS:  Labs Reviewed   CBC WITH AUTO DIFFERENTIAL - Abnormal; Notable for the following components:       Result Value    RBC 3.76 (*)     Hemoglobin 11.9 (*)     Hematocrit 34.9 (*)     MCH 31.6 (*)     RDW 14.7 (*)     Neutrophils % 87.6 (*)     Lymphocytes % 5.8 (*)     Neutrophils Absolute 8.8 (*)     Lymphocytes Absolute 0.6 (*)     All other components within normal limits   COMPREHENSIVE METABOLIC PANEL - Abnormal; Notable for the following components:    Sodium 133 (*)     Glucose 123 (*)     BUN 32 (*)     CREATININE 1.5 (*)     GFR Non- 43 (*)     GFR  53 (*)     Total Protein 6.4 (*)     Alkaline Phosphatase 5 (*)     All other components within normal limits   LIPASE       All other labs were within normal range or not returned as of this dictation.     EMERGENCY DEPARTMENT COURSE and DIFFERENTIAL DIAGNOSIS/MDM:   Vitals:    Vitals:    08/07/20 2252 08/08/20 0135 08/08/20 0244 08/08/20 0330   BP: (!) 123/52 (!) 110/40 (!) 116/52 (!) 106/50   Pulse: 75 70 67 77   Resp: 20 20 20 20   Temp: 96.9 °F (36.1 °C)      TempSrc: Oral      SpO2: 94% 93% 93% 95%   Weight: 223 lb (101.2 kg)      Height: 5' 10\" (1.778 m)          MDM    Reassessment    Patient's laboratory studies

## 2020-08-24 ENCOUNTER — LAB REQUISITION (OUTPATIENT)
Dept: LAB | Facility: HOSPITAL | Age: 85
End: 2020-08-24

## 2020-08-24 DIAGNOSIS — Z00.00 ENCOUNTER FOR GENERAL ADULT MEDICAL EXAMINATION WITHOUT ABNORMAL FINDINGS: ICD-10-CM

## 2020-08-24 LAB — SARS-COV-2 RDRP RESP QL NAA+PROBE: NOT DETECTED

## 2020-08-24 PROCEDURE — 87635 SARS-COV-2 COVID-19 AMP PRB: CPT | Performed by: INTERNAL MEDICINE

## 2020-08-28 ASSESSMENT — ENCOUNTER SYMPTOMS
DIARRHEA: 0
NAUSEA: 1
VOMITING: 1
SHORTNESS OF BREATH: 0
ABDOMINAL PAIN: 0
ABDOMINAL DISTENTION: 0

## 2020-09-03 ENCOUNTER — VIRTUAL VISIT (OUTPATIENT)
Dept: CARDIOLOGY | Age: 85
End: 2020-09-03
Payer: MEDICARE

## 2020-09-03 PROCEDURE — 99213 OFFICE O/P EST LOW 20 MIN: CPT | Performed by: INTERNAL MEDICINE

## 2020-09-03 PROCEDURE — 4040F PNEUMOC VAC/ADMIN/RCVD: CPT | Performed by: INTERNAL MEDICINE

## 2020-09-03 PROCEDURE — 1036F TOBACCO NON-USER: CPT | Performed by: INTERNAL MEDICINE

## 2020-09-03 PROCEDURE — G8419 CALC BMI OUT NRM PARAM NOF/U: HCPCS | Performed by: INTERNAL MEDICINE

## 2020-09-03 PROCEDURE — 1123F ACP DISCUSS/DSCN MKR DOCD: CPT | Performed by: INTERNAL MEDICINE

## 2020-09-03 PROCEDURE — G8427 DOCREV CUR MEDS BY ELIG CLIN: HCPCS | Performed by: INTERNAL MEDICINE

## 2020-09-03 ASSESSMENT — ENCOUNTER SYMPTOMS
EYES NEGATIVE: 1
RESPIRATORY NEGATIVE: 1
VOMITING: 0
GASTROINTESTINAL NEGATIVE: 1
SHORTNESS OF BREATH: 0
NAUSEA: 0
DIARRHEA: 0

## 2020-09-03 NOTE — PROGRESS NOTES
[facility] Progress Note                            Date:  9/3/2020  Patient: Sara Dorado  Age:  80 y.o., 7/20/1925      Reason for evaluation:         SUBJECTIVE:    Visit today via telephone contact. Recent echocardiogram 7/21/2020 mitral calcification mild MR mild AR mild concentric LVH EF 40 to 89% diastolic dysfunction. Seen last by me 7/22/2020. Presented with arm pain and chest tightness. Troponins minimally elevated 0.04. History of abdominal aortic aneurysm 4.3 cm. Presently denies any further arm or chest pain since discharge. He denies dyspnea no other cardiovascular complaints vocalize. Review of Systems   Constitutional: Negative. Negative for chills, fever and unexpected weight change. HENT: Negative. Eyes: Negative. Respiratory: Negative. Negative for shortness of breath. Cardiovascular: Negative. Negative for chest pain. Gastrointestinal: Negative. Negative for diarrhea, nausea and vomiting. Endocrine: Negative. Genitourinary: Negative. Musculoskeletal: Negative. Skin: Negative. Neurological: Negative. OBJECTIVE:     There were no vitals taken for this visit. Labs:   CBC: No results for input(s): WBC, HGB, HCT, PLT in the last 72 hours. BMP:No results for input(s): NA, K, CO2, BUN, CREATININE, LABGLOM, GLUCOSE in the last 72 hours. BNP: No results for input(s): BNP in the last 72 hours. PT/INR: No results for input(s): PROTIME, INR in the last 72 hours. APTT:No results for input(s): APTT in the last 72 hours. CARDIAC ENZYMES:No results for input(s): CKTOTAL, CKMB, CKMBINDEX, TROPONINI in the last 72 hours. FASTING LIPID PANEL:  Lab Results   Component Value Date    HDL 29 07/22/2020    LDLDIRECT 63 02/25/2015    LDLCALC 65 07/22/2020    TRIG 123 07/22/2020     LIVER PROFILE:No results for input(s): AST, ALT, LABALBU in the last 72 hours.         Past Medical History:   Diagnosis Date    [facility] failure [facility] [address]     Admitted with pneumonia     Cancer [facility])     prostate    [facility]erebral artery occlusion with cerebral infarction ([facility] [address]     Depression     GERD (gastroesophageal reflux disease)     Hemiplegia and hemiparesis following cerebral infarction affecting left dominant side ([facility] [address]     Hyperlipidemia     Hypertension     Palliative care patient 02/24/2020    TIA (transient ischemic attack)     Vitamin D deficiency      Past Surgical History:   Procedure Laterality Date    ANKLE FUSION      APPENDECTOMY      CHOLECYSTECTOMY      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    KNEE ARTHROPLASTY      bilateral     Family History   Problem Relation Age of Onset    Diabetes Mother     Heart Disease Mother     Heart Attack Mother     Other Mother         cerebral hemorrhage    Diabetes Father     Other Other         all 5 siblings have diabetes    Other Sister         rheumatic fever     Allergies   Allergen Reactions    Ketek [Telithromycin]      Current Outpatient Medications   Medication Sig Dispense Refill    acetaminophen (TYLENOL) 325 MG tablet Take 650 mg by mouth every 6 hours as needed for Pain      EMOLLIENT EX Apply topically 2 times daily Apply to arms and legs      nitroGLYCERIN (NITROSTAT) 0.4 MG SL tablet Place 0.4 mg under the tongue every 5 minutes as needed for Chest pain up to max of 3 total doses. If no relief after 1 dose, call 911.  albuterol sulfate HFA (VENTOLIN HFA) 108 (90 Base) MCG/ACT inhaler Inhale 2 puffs into the lungs every 6 hours as needed for Wheezing      ondansetron (ZOFRAN) 4 MG tablet Take 4 mg by mouth every 8 hours as needed for Nausea or Vomiting      acyclovir (ZOVIRAX) 5 % ointment Apply topically 2 times daily To lip.       aspirin 81 MG chewable tablet Take 1 tablet by mouth daily 30 tablet 3    isosorbide mononitrate (IMDUR) 30 MG extended release tablet Take 1 tablet by mouth daily 30 tablet 0    metoprolol succinate (TOPROL XL) 50 MG extended Brian Crockett MD 9/3/2020 3:31 PM CDT    [id number] [facility]      Thisdictation was generated by voice recognition computer software. Although all attempts are made to edit the dictation for accuracy, there may be errors in the transcription that are not intended.

## 2020-12-20 ENCOUNTER — HOSPITAL ENCOUNTER (INPATIENT)
Age: 85
LOS: 7 days | Discharge: NURSING, REHAB OR LONG-TERM CARE | DRG: 177 | End: 2020-12-27
Attending: EMERGENCY MEDICINE | Admitting: INTERNAL MEDICINE
Payer: MEDICARE

## 2020-12-20 ENCOUNTER — APPOINTMENT (OUTPATIENT)
Dept: GENERAL RADIOLOGY | Age: 85
DRG: 177 | End: 2020-12-20
Payer: MEDICARE

## 2020-12-20 PROBLEM — N18.30 CKD (CHRONIC KIDNEY DISEASE), STAGE III (HCC): Status: ACTIVE | Noted: 2020-12-20

## 2020-12-20 PROBLEM — U07.1 COVID-19: Status: ACTIVE | Noted: 2020-12-20

## 2020-12-20 LAB
ADENOVIRUS BY PCR: NOT DETECTED
ALBUMIN SERPL-MCNC: 3.6 G/DL (ref 3.5–5.2)
ALP BLD-CCNC: 74 U/L (ref 40–130)
ALT SERPL-CCNC: 26 U/L (ref 5–41)
ANION GAP SERPL CALCULATED.3IONS-SCNC: 14 MMOL/L (ref 7–19)
AST SERPL-CCNC: 33 U/L (ref 5–40)
BACTERIA: ABNORMAL /HPF
BASOPHILS ABSOLUTE: 0 K/UL (ref 0–0.2)
BASOPHILS RELATIVE PERCENT: 0.1 % (ref 0–1)
BILIRUB SERPL-MCNC: 0.7 MG/DL (ref 0.2–1.2)
BILIRUBIN URINE: NEGATIVE
BLOOD, URINE: NEGATIVE
BORDETELLA PARAPERTUSSIS BY PCR: NOT DETECTED
BORDETELLA PERTUSSIS BY PCR: NOT DETECTED
BUN BLDV-MCNC: 42 MG/DL (ref 8–23)
C-REACTIVE PROTEIN: 22.39 MG/DL (ref 0–0.5)
CALCIUM SERPL-MCNC: 9 MG/DL (ref 8.2–9.6)
CHLAMYDOPHILIA PNEUMONIAE BY PCR: NOT DETECTED
CHLORIDE BLD-SCNC: 96 MMOL/L (ref 98–111)
CLARITY: CLEAR
CO2: 24 MMOL/L (ref 22–29)
COLOR: YELLOW
CORONAVIRUS 229E BY PCR: NOT DETECTED
CORONAVIRUS HKU1 BY PCR: NOT DETECTED
CORONAVIRUS NL63 BY PCR: NOT DETECTED
CORONAVIRUS OC43 BY PCR: NOT DETECTED
CREAT SERPL-MCNC: 2.9 MG/DL (ref 0.5–1.2)
EOSINOPHILS ABSOLUTE: 0 K/UL (ref 0–0.6)
EOSINOPHILS RELATIVE PERCENT: 0 % (ref 0–5)
EPITHELIAL CELLS, UA: 1 /HPF (ref 0–5)
FERRITIN: 589.2 NG/ML (ref 30–400)
GFR AFRICAN AMERICAN: 25
GFR NON-AFRICAN AMERICAN: 20
GLUCOSE BLD-MCNC: 96 MG/DL (ref 74–109)
GLUCOSE URINE: NEGATIVE MG/DL
HBA1C MFR BLD: 5.4 % (ref 4–6)
HCT VFR BLD CALC: 38.9 % (ref 42–52)
HEMOGLOBIN: 12.8 G/DL (ref 14–18)
HUMAN METAPNEUMOVIRUS BY PCR: NOT DETECTED
HUMAN RHINOVIRUS/ENTEROVIRUS BY PCR: NOT DETECTED
HYALINE CASTS: 7 /HPF (ref 0–8)
IMMATURE GRANULOCYTES #: 0.1 K/UL
INFLUENZA A BY PCR: NOT DETECTED
INFLUENZA B BY PCR: NOT DETECTED
KETONES, URINE: NEGATIVE MG/DL
LACTATE DEHYDROGENASE: 174 U/L (ref 91–215)
LACTIC ACID: 4.2 MMOL/L (ref 0.5–1.9)
LEUKOCYTE ESTERASE, URINE: ABNORMAL
LYMPHOCYTES ABSOLUTE: 0.6 K/UL (ref 1.1–4.5)
LYMPHOCYTES RELATIVE PERCENT: 3.5 % (ref 20–40)
MCH RBC QN AUTO: 31.7 PG (ref 27–31)
MCHC RBC AUTO-ENTMCNC: 32.9 G/DL (ref 33–37)
MCV RBC AUTO: 96.3 FL (ref 80–94)
MONOCYTES ABSOLUTE: 0.8 K/UL (ref 0–0.9)
MONOCYTES RELATIVE PERCENT: 4.6 % (ref 0–10)
MYCOPLASMA PNEUMONIAE BY PCR: NOT DETECTED
NEUTROPHILS ABSOLUTE: 15 K/UL (ref 1.5–7.5)
NEUTROPHILS RELATIVE PERCENT: 91.4 % (ref 50–65)
NITRITE, URINE: NEGATIVE
PARAINFLUENZA VIRUS 1 BY PCR: NOT DETECTED
PARAINFLUENZA VIRUS 2 BY PCR: NOT DETECTED
PARAINFLUENZA VIRUS 3 BY PCR: NOT DETECTED
PARAINFLUENZA VIRUS 4 BY PCR: NOT DETECTED
PDW BLD-RTO: 14.9 % (ref 11.5–14.5)
PH UA: 5 (ref 5–8)
PLATELET # BLD: 137 K/UL (ref 130–400)
PMV BLD AUTO: 9.5 FL (ref 9.4–12.4)
POTASSIUM REFLEX MAGNESIUM: 4.8 MMOL/L (ref 3.5–5)
PROCALCITONIN: 17.99 NG/ML (ref 0–0.09)
PROTEIN UA: NEGATIVE MG/DL
RBC # BLD: 4.04 M/UL (ref 4.7–6.1)
RBC UA: 4 /HPF (ref 0–4)
RESPIRATORY SYNCYTIAL VIRUS BY PCR: NOT DETECTED
SARS-COV-2, PCR: DETECTED
SODIUM BLD-SCNC: 134 MMOL/L (ref 136–145)
SPECIFIC GRAVITY UA: 1.01 (ref 1–1.03)
TOTAL PROTEIN: 6.3 G/DL (ref 6.6–8.7)
UROBILINOGEN, URINE: 0.2 E.U./DL
VITAMIN D 25-HYDROXY: 59.8 NG/ML
WBC # BLD: 16.4 K/UL (ref 4.8–10.8)
WBC UA: 5 /HPF (ref 0–5)

## 2020-12-20 PROCEDURE — 99285 EMERGENCY DEPT VISIT HI MDM: CPT

## 2020-12-20 PROCEDURE — 96374 THER/PROPH/DIAG INJ IV PUSH: CPT

## 2020-12-20 PROCEDURE — 6370000000 HC RX 637 (ALT 250 FOR IP): Performed by: INTERNAL MEDICINE

## 2020-12-20 PROCEDURE — 6370000000 HC RX 637 (ALT 250 FOR IP): Performed by: EMERGENCY MEDICINE

## 2020-12-20 PROCEDURE — 85025 COMPLETE CBC W/AUTO DIFF WBC: CPT

## 2020-12-20 PROCEDURE — 99999 PR OFFICE/OUTPT VISIT,PROCEDURE ONLY: CPT | Performed by: EMERGENCY MEDICINE

## 2020-12-20 PROCEDURE — 1210000000 HC MED SURG R&B

## 2020-12-20 PROCEDURE — 82728 ASSAY OF FERRITIN: CPT

## 2020-12-20 PROCEDURE — 83615 LACTATE (LD) (LDH) ENZYME: CPT

## 2020-12-20 PROCEDURE — 71045 X-RAY EXAM CHEST 1 VIEW: CPT

## 2020-12-20 PROCEDURE — 0202U NFCT DS 22 TRGT SARS-COV-2: CPT

## 2020-12-20 PROCEDURE — 6360000002 HC RX W HCPCS: Performed by: INTERNAL MEDICINE

## 2020-12-20 PROCEDURE — 80053 COMPREHEN METABOLIC PANEL: CPT

## 2020-12-20 PROCEDURE — 84145 PROCALCITONIN (PCT): CPT

## 2020-12-20 PROCEDURE — 2500000003 HC RX 250 WO HCPCS: Performed by: INTERNAL MEDICINE

## 2020-12-20 PROCEDURE — 93005 ELECTROCARDIOGRAM TRACING: CPT | Performed by: EMERGENCY MEDICINE

## 2020-12-20 PROCEDURE — 83605 ASSAY OF LACTIC ACID: CPT

## 2020-12-20 PROCEDURE — 83036 HEMOGLOBIN GLYCOSYLATED A1C: CPT

## 2020-12-20 PROCEDURE — 36415 COLL VENOUS BLD VENIPUNCTURE: CPT

## 2020-12-20 PROCEDURE — 81001 URINALYSIS AUTO W/SCOPE: CPT

## 2020-12-20 PROCEDURE — 82306 VITAMIN D 25 HYDROXY: CPT

## 2020-12-20 PROCEDURE — 86140 C-REACTIVE PROTEIN: CPT

## 2020-12-20 PROCEDURE — 6360000002 HC RX W HCPCS: Performed by: EMERGENCY MEDICINE

## 2020-12-20 PROCEDURE — 2580000003 HC RX 258: Performed by: EMERGENCY MEDICINE

## 2020-12-20 PROCEDURE — 2580000003 HC RX 258: Performed by: INTERNAL MEDICINE

## 2020-12-20 RX ORDER — FLUTICASONE PROPIONATE 50 MCG
2 SPRAY, SUSPENSION (ML) NASAL NIGHTLY PRN
Status: DISCONTINUED | OUTPATIENT
Start: 2020-12-20 | End: 2020-12-27 | Stop reason: HOSPADM

## 2020-12-20 RX ORDER — CALCIUM CARBONATE 200(500)MG
500 TABLET,CHEWABLE ORAL DAILY PRN
Status: DISCONTINUED | OUTPATIENT
Start: 2020-12-20 | End: 2020-12-27 | Stop reason: HOSPADM

## 2020-12-20 RX ORDER — ACETAMINOPHEN 650 MG/1
650 SUPPOSITORY RECTAL EVERY 6 HOURS PRN
Status: DISCONTINUED | OUTPATIENT
Start: 2020-12-20 | End: 2020-12-27 | Stop reason: HOSPADM

## 2020-12-20 RX ORDER — SODIUM CHLORIDE 9 MG/ML
INJECTION, SOLUTION INTRAVENOUS CONTINUOUS
Status: DISCONTINUED | OUTPATIENT
Start: 2020-12-20 | End: 2020-12-27

## 2020-12-20 RX ORDER — GUAIFENESIN/DEXTROMETHORPHAN 100-10MG/5
5 SYRUP ORAL EVERY 4 HOURS PRN
Status: DISCONTINUED | OUTPATIENT
Start: 2020-12-20 | End: 2020-12-27 | Stop reason: HOSPADM

## 2020-12-20 RX ORDER — MECOBALAMIN 5000 MCG
10 TABLET,DISINTEGRATING ORAL NIGHTLY PRN
Status: DISCONTINUED | OUTPATIENT
Start: 2020-12-20 | End: 2020-12-27 | Stop reason: HOSPADM

## 2020-12-20 RX ORDER — PRAVASTATIN SODIUM 20 MG
20 TABLET ORAL NIGHTLY
Status: DISCONTINUED | OUTPATIENT
Start: 2020-12-20 | End: 2020-12-27 | Stop reason: HOSPADM

## 2020-12-20 RX ORDER — ALBUTEROL SULFATE 90 UG/1
2 AEROSOL, METERED RESPIRATORY (INHALATION) ONCE
Status: COMPLETED | OUTPATIENT
Start: 2020-12-20 | End: 2020-12-20

## 2020-12-20 RX ORDER — ASPIRIN 81 MG/1
81 TABLET, CHEWABLE ORAL DAILY
Status: DISCONTINUED | OUTPATIENT
Start: 2020-12-21 | End: 2020-12-27 | Stop reason: HOSPADM

## 2020-12-20 RX ORDER — ACETAMINOPHEN 325 MG/1
650 TABLET ORAL EVERY 6 HOURS PRN
Status: DISCONTINUED | OUTPATIENT
Start: 2020-12-20 | End: 2020-12-27 | Stop reason: HOSPADM

## 2020-12-20 RX ORDER — ISOSORBIDE MONONITRATE 30 MG/1
30 TABLET, EXTENDED RELEASE ORAL DAILY
Status: DISCONTINUED | OUTPATIENT
Start: 2020-12-21 | End: 2020-12-27 | Stop reason: HOSPADM

## 2020-12-20 RX ORDER — 0.9 % SODIUM CHLORIDE 0.9 %
1000 INTRAVENOUS SOLUTION INTRAVENOUS ONCE
Status: COMPLETED | OUTPATIENT
Start: 2020-12-20 | End: 2020-12-20

## 2020-12-20 RX ORDER — CHOLECALCIFEROL (VITAMIN D3) 125 MCG
1000 CAPSULE ORAL DAILY
Status: DISCONTINUED | OUTPATIENT
Start: 2020-12-21 | End: 2020-12-27 | Stop reason: HOSPADM

## 2020-12-20 RX ORDER — MAGNESIUM SULFATE 1 G/100ML
1 INJECTION INTRAVENOUS PRN
Status: DISCONTINUED | OUTPATIENT
Start: 2020-12-20 | End: 2020-12-27 | Stop reason: HOSPADM

## 2020-12-20 RX ORDER — VITAMIN B COMPLEX
2000 TABLET ORAL DAILY
Status: DISCONTINUED | OUTPATIENT
Start: 2020-12-20 | End: 2020-12-20 | Stop reason: ALTCHOICE

## 2020-12-20 RX ORDER — ALBUTEROL SULFATE 90 UG/1
2 AEROSOL, METERED RESPIRATORY (INHALATION) EVERY 6 HOURS PRN
Status: DISCONTINUED | OUTPATIENT
Start: 2020-12-20 | End: 2020-12-27 | Stop reason: HOSPADM

## 2020-12-20 RX ORDER — POTASSIUM CHLORIDE 7.45 MG/ML
10 INJECTION INTRAVENOUS PRN
Status: DISCONTINUED | OUTPATIENT
Start: 2020-12-20 | End: 2020-12-27 | Stop reason: HOSPADM

## 2020-12-20 RX ORDER — HEPARIN SODIUM 5000 [USP'U]/ML
5000 INJECTION, SOLUTION INTRAVENOUS; SUBCUTANEOUS EVERY 8 HOURS SCHEDULED
Status: DISCONTINUED | OUTPATIENT
Start: 2020-12-20 | End: 2020-12-23

## 2020-12-20 RX ORDER — VITAMIN B COMPLEX
6000 TABLET ORAL DAILY
Status: DISCONTINUED | OUTPATIENT
Start: 2020-12-20 | End: 2020-12-27 | Stop reason: HOSPADM

## 2020-12-20 RX ORDER — DEXAMETHASONE SODIUM PHOSPHATE 10 MG/ML
4 INJECTION, SOLUTION INTRAMUSCULAR; INTRAVENOUS ONCE
Status: COMPLETED | OUTPATIENT
Start: 2020-12-20 | End: 2020-12-20

## 2020-12-20 RX ORDER — FINASTERIDE 5 MG/1
5 TABLET, FILM COATED ORAL DAILY
Status: DISCONTINUED | OUTPATIENT
Start: 2020-12-21 | End: 2020-12-27 | Stop reason: HOSPADM

## 2020-12-20 RX ORDER — BUDESONIDE AND FORMOTEROL FUMARATE DIHYDRATE 160; 4.5 UG/1; UG/1
2 AEROSOL RESPIRATORY (INHALATION) 2 TIMES DAILY
Status: DISCONTINUED | OUTPATIENT
Start: 2020-12-20 | End: 2020-12-27 | Stop reason: HOSPADM

## 2020-12-20 RX ORDER — CLOPIDOGREL BISULFATE 75 MG/1
75 TABLET ORAL DAILY
Status: DISCONTINUED | OUTPATIENT
Start: 2020-12-20 | End: 2020-12-27 | Stop reason: HOSPADM

## 2020-12-20 RX ORDER — ASCORBIC ACID 500 MG
500 TABLET ORAL 2 TIMES DAILY
Status: DISCONTINUED | OUTPATIENT
Start: 2020-12-20 | End: 2020-12-27 | Stop reason: HOSPADM

## 2020-12-20 RX ORDER — POTASSIUM CHLORIDE 20 MEQ/1
40 TABLET, EXTENDED RELEASE ORAL PRN
Status: DISCONTINUED | OUTPATIENT
Start: 2020-12-20 | End: 2020-12-27 | Stop reason: HOSPADM

## 2020-12-20 RX ORDER — PANTOPRAZOLE SODIUM 20 MG/1
20 TABLET, DELAYED RELEASE ORAL DAILY
Status: DISCONTINUED | OUTPATIENT
Start: 2020-12-21 | End: 2020-12-27 | Stop reason: HOSPADM

## 2020-12-20 RX ORDER — TAMSULOSIN HYDROCHLORIDE 0.4 MG/1
0.4 CAPSULE ORAL 2 TIMES DAILY
Status: DISCONTINUED | OUTPATIENT
Start: 2020-12-20 | End: 2020-12-27 | Stop reason: HOSPADM

## 2020-12-20 RX ADMIN — SODIUM CHLORIDE: 9 INJECTION, SOLUTION INTRAVENOUS at 23:54

## 2020-12-20 RX ADMIN — DEXAMETHASONE SODIUM PHOSPHATE 4 MG: 10 INJECTION, SOLUTION INTRAMUSCULAR; INTRAVENOUS at 18:41

## 2020-12-20 RX ADMIN — Medication 6000 UNITS: at 23:52

## 2020-12-20 RX ADMIN — SODIUM CHLORIDE 1000 ML: 9 INJECTION, SOLUTION INTRAVENOUS at 18:40

## 2020-12-20 RX ADMIN — SODIUM CHLORIDE, PRESERVATIVE FREE 1 G: 5 INJECTION INTRAVENOUS at 23:50

## 2020-12-20 RX ADMIN — BUDESONIDE AND FORMOTEROL FUMARATE DIHYDRATE 2 PUFF: 160; 4.5 AEROSOL RESPIRATORY (INHALATION) at 23:51

## 2020-12-20 RX ADMIN — DOXYCYCLINE 100 MG: 100 INJECTION, POWDER, LYOPHILIZED, FOR SOLUTION INTRAVENOUS at 23:50

## 2020-12-20 RX ADMIN — CLOPIDOGREL 75 MG: 75 TABLET, FILM COATED ORAL at 23:54

## 2020-12-20 RX ADMIN — OXYCODONE HYDROCHLORIDE AND ACETAMINOPHEN 500 MG: 500 TABLET ORAL at 23:49

## 2020-12-20 RX ADMIN — TAMSULOSIN HYDROCHLORIDE 0.4 MG: 0.4 CAPSULE ORAL at 23:49

## 2020-12-20 RX ADMIN — HEPARIN SODIUM 5000 UNITS: 5000 INJECTION INTRAVENOUS; SUBCUTANEOUS at 23:51

## 2020-12-20 RX ADMIN — PRAVASTATIN SODIUM 20 MG: 20 TABLET ORAL at 23:53

## 2020-12-20 RX ADMIN — ALBUTEROL SULFATE 2 PUFF: 90 AEROSOL, METERED RESPIRATORY (INHALATION) at 21:24

## 2020-12-20 ASSESSMENT — ENCOUNTER SYMPTOMS
RHINORRHEA: 0
ABDOMINAL PAIN: 0
COUGH: 0
DIARRHEA: 0
SORE THROAT: 0
SHORTNESS OF BREATH: 0
VOMITING: 0
NAUSEA: 0

## 2020-12-20 NOTE — ED PROVIDER NOTES
[facility] 4 ONCOLOGY UNIT  eMERGENCY dEPARTMENT eNCOUnter      Pt Name: Lorrie Cogan  MRN: [id number]  [place] 7/20/1925  Date of evaluation: 12/20/2020  Provider: Scott Roldan MD    CHIEF COMPLAINT       Chief Complaint   Patient presents with    Fatigue     arrived via EMS from [address] [place] [address] with reports of generalize pain and weakness, roommate has Covid, Pt tested neg x2         HISTORY OF PRESENT ILLNESS   (Location/Symptom, Timing/Onset,Context/Setting, Quality, Duration, Modifying Factors, Severity)  Note limiting factors. Lorrie Cogan is a 80 y.o. male who presents to the emergency department      The history is provided by the patient. History limited by: presbycusis. Generalized Body Aches  Location:  \"all over\"  Quality:  \"ache\"  Severity:  Unable to specify  Onset quality:  Sudden  Duration:  1 day  Timing:  Constant  Progression:  Unchanged  Chronicity:  New  Context:  Covid contact at NH  Relieved by:  Nothing tried  Worsened by:  Nothing  Associated symptoms: fatigue and myalgias    Associated symptoms: no abdominal pain, no chest pain, no cough, no diarrhea, no fever, no loss of consciousness, no nausea, no rash, no rhinorrhea, no shortness of breath, no sore throat and no vomiting        NursingNotes were reviewed. REVIEW OF SYSTEMS    (2-9 systems for level 4, 10 or more for level 5)     Review of Systems   Constitutional: Positive for fatigue. Negative for fever. HENT: Negative for rhinorrhea and sore throat. Respiratory: Negative for cough and shortness of breath. Cardiovascular: Negative for chest pain. Gastrointestinal: Negative for abdominal pain, diarrhea, nausea and vomiting. Musculoskeletal: Positive for myalgias. Skin: Negative for rash. Neurological: Negative for loss of consciousness. All other systems reviewed and are negative. Except as noted above the remainder of the review of systems was reviewed and negative.        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 (HCC)     Hyperlipidemia     Hypertension     Palliative care patient 02/24/2020    TIA (transient ischemic attack)     Vitamin D deficiency          SURGICALHISTORY       Past Surgical History:   Procedure Laterality Date    ANKLE FUSION      APPENDECTOMY      CHOLECYSTECTOMY      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    KNEE ARTHROPLASTY      bilateral         CURRENT MEDICATIONS       Current Discharge Medication List      CONTINUE these medications which have NOT CHANGED    Details   acetaminophen (TYLENOL) 325 MG tablet Take 650 mg by mouth every 6 hours as needed for Pain      EMOLLIENT EX Apply topically 2 times daily Apply to arms and legs      nitroGLYCERIN (NITROSTAT) 0.4 MG SL tablet Place 0.4 mg under the tongue every 5 minutes as needed for Chest pain up to max of 3 total doses.  If no relief after 1 dose, call 911.      albuterol sulfate HFA (VENTOLIN HFA) 108 (90 Base) MCG/ACT inhaler Inhale 2 puffs into the lungs every 6 hours as needed for Wheezing      ondansetron (ZOFRAN) 4 MG tablet Take 4 mg by mouth every 8 hours as needed for Nausea or Vomiting      aspirin 81 MG chewable tablet Take 1 tablet by mouth daily  Qty: 30 tablet, Refills: 3      sertraline (ZOLOFT) 50 MG tablet Take 50 mg by mouth daily      bumetanide (BUMEX) 0.5 MG tablet Take 0.5 mg by mouth daily      finasteride (PROSCAR) 5 MG tablet Take 1 tablet by mouth daily  Qty: 30 tablet, Refills: 3      tamsulosin (FLOMAX) 0.4 MG capsule Take 1 capsule by mouth 2 times daily  Qty: 30 capsule, Refills: 3 fluticasone (FLONASE) 50 MCG/ACT nasal spray 2 sprays by Each Nostril route nightly as needed for Rhinitis      loperamide (IMODIUM) 2 MG capsule Take 2 mg by mouth 4 times daily as needed for Diarrhea      Melatonin 10 MG TABS Take 10 mg by mouth nightly as needed      polyethylene glycol (MIRALAX) powder Take 17 g by mouth daily as needed (constipation)      pantoprazole (PROTONIX) 20 MG tablet Take 20 mg by mouth daily      carboxymethylcellulose 1 % ophthalmic solution 1 drop 2 times daily      tiotropium (SPIRIVA RESPIMAT) 1.25 MCG/ACT AERS inhaler Inhale 1 puff into the lungs daily      clopidogrel (PLAVIX) 75 MG tablet Take 75 mg by mouth daily      vitamin B-12 (CYANOCOBALAMIN) 1000 MCG tablet Take 1,000 mcg by mouth daily      Cholecalciferol (VITAMIN D3) 2000 units CAPS Take 2,000 Units by mouth daily      Magnesium Chloride-Calcium  MG Take 1 tablet by mouth nightly      pravastatin (PRAVACHOL) 20 MG tablet 20 mg nightly       Ascorbic Acid (VITAMIN C) 500 MG tablet Take 500 mg by mouth 2 times daily       acyclovir (ZOVIRAX) 5 % ointment Apply topically 2 times daily To lip.      isosorbide mononitrate (IMDUR) 30 MG extended release tablet Take 1 tablet by mouth daily  Qty: 30 tablet, Refills: 0      metoprolol succinate (TOPROL XL) 50 MG extended release tablet Take 1 tablet by mouth daily  Qty: 30 tablet, Refills: 0      calcium carbonate (TUMS) 500 MG chewable tablet Take 1 tablet by mouth daily as needed for Heartburn             ALLERGIES     Ketek [telithromycin]    FAMILY HISTORY       Family History   Problem Relation Age of Onset    Diabetes Mother     Heart Disease Mother     Heart Attack Mother     Other Mother         cerebral hemorrhage    Diabetes Father     Other Other         all 5 siblings have diabetes    Other Sister         rheumatic fever          SOCIAL HISTORY       Social History     Socioeconomic History    Marital status:      Spouse name: None  Number of children: None    Years of education: None    Highest education level: None   Occupational History    None   Social Needs    Financial resource strain: None    Food insecurity     Worry: None     Inability: None    Transportation needs     Medical: None     Non-medical: None   Tobacco Use    Smoking status: Never Smoker    Smokeless tobacco: Never Used   Substance and Sexual Activity    Alcohol use: No    Drug use: No    Sexual activity: None   Lifestyle    Physical activity     Days per week: None     Minutes per session: None    Stress: None   Relationships    Social connections     Talks on phone: None     Gets together: None     Attends Cheondo[employer] service: None     Active member of club or organization: None     Attends meetings of clubs or organizations: None     Relationship status: None    Intimate partner violence     Fear of current or ex partner: None     Emotionally abused: None     Physically abused: None     Forced sexual activity: None   Other Topics Concern    None   Social History Narrative    Retired farmer    Previously  wife passed away in 2006    He has 3 sons    Never in the [employer] not specified    [address]    Presently not driving    Unable to ambulate    Denies alcohol or tobacco consumption or substance usage       SCREENINGS    Alexia Coma Scale  Eye Opening: Spontaneous  Best Verbal Response: Oriented  Best Motor Response: Obeys commands  Alexia Coma Scale Score: 15 @FLOW([id number])@      PHYSICAL EXAM    (up to 7 for level 4, 8 or more for level 5)     ED Triage Vitals [12/20/20 1556]   BP Temp Temp Source Pulse Resp SpO2 Height Weight   (!) 108/49 98.1 °F (36.7 °C) Oral 97 20 94 % 5' 9\" (1.753 m) 225 lb (102.1 kg)       Physical Exam  Vitals signs and nursing note reviewed. Constitutional:       General: He is not in acute distress. Appearance: He is obese. He is not ill-appearing, toxic-appearing or diaphoretic. Comments: On O2 - is at NH too   HENT:      Nose: Nose normal.      Mouth/Throat:      Mouth: Mucous membranes are moist.      Pharynx: Oropharynx is clear. Eyes:      Conjunctiva/sclera: Conjunctivae normal.   Neck:      Musculoskeletal: Normal range of motion and neck supple. Cardiovascular:      Rate and Rhythm: Normal rate and regular rhythm. Heart sounds: Normal heart sounds. Pulmonary:      Effort: Pulmonary effort is normal.      Breath sounds: Normal breath sounds. Abdominal:      Tenderness: There is no abdominal tenderness. Musculoskeletal:      Right lower leg: No edema. Left lower leg: No edema. Skin:     General: Skin is warm and dry. Neurological:      General: No focal deficit present. Mental Status: He is alert. Cranial Nerves: No cranial nerve deficit. Psychiatric:         Mood and Affect: Mood normal.         DIAGNOSTIC RESULTS     EKG: All EKG's are interpreted by the Emergency Department Physician who either signs or Co-signsthis chart in the absence of a cardiologist.    EKG:  Regular rate and rhythm. Normal P waves and KS interval. Normal QRS. Normal QT interval. No ST elevation or depression. This EKG was interpreted by me. RADIOLOGY:   Non-plain filmimages such as CT, Ultrasound and MRI are read by the radiologist. Plain radiographic images are visualized and preliminarily interpreted by the emergency physician with the below findings:        Interpretation per the Radiologist below, if available at the time ofthis note:    [address]   Final Result   A limited diagnostic study in the absence of a ventilation   scan. An intermediate probability of pulmonary embolism of the right lower   lobe. Further correlation with CT angiography of the chest may be   obtained. Signed by Dr Lolly Moe on 12/23/2020 3:45 PM      XR CHEST PORTABLE   Final Result   1.  Patchy left lower lobe interstitial infiltrate compatible with pneumonia. Signed by Dr Shanell Laura on 12/20/2020 6:25 PM            ED BEDSIDE ULTRASOUND:   Performed by ED Physician - none    LABS:  Labs Reviewed   RESPIRATORY PANEL, MOLECULAR, WITH COVID-19 - Abnormal; Notable for the following components:       Result Value    SARS-CoV-2, PCR DETECTED (*)     All other components within normal limits    Narrative:     Tomasa Sheets tel. ,  Results callled to Conrado Lee RN ER, 12/20/2020 17:33, by [facility]  Results callled to Conrado Lee RN ER, 12/20/2020 17:33, by [facility]   CULTURE, BLOOD 1 - Abnormal; Notable for the following components:    Blood Culture, Routine   (*)     Value:  Bottle volume = <5 ml  Quality of results might be  affected with low volume.       Organism Staphylococcus capitis (*)     All other components within normal limits    Narrative:     ORDER#: [id number]                          ORDERED BY: EMMA Mckeon  SOURCE: Blood lac                          COLLECTED:  12/21/20 11:00  ANTIBIOTICS AT [facility].:                      RECEIVED :  12/21/20 12:06  CALL  [facility] [phone], 12  Microbiology results called to and read back by Veronica Proctor RN, 4th  floor, 12/22/2020 09:16, by [facility]   CBC WITH AUTO DIFFERENTIAL - Abnormal; Notable for the following components:    WBC 16.4 (*)     RBC 4.04 (*)     Hemoglobin 12.8 (*)     Hematocrit 38.9 (*)     MCV 96.3 (*)     MCH 31.7 (*)     MCHC 32.9 (*)     RDW 14.9 (*)     Neutrophils % 91.4 (*)     Lymphocytes % 3.5 (*)     Neutrophils Absolute 15.0 (*)     Lymphocytes Absolute 0.6 (*)     All other components within normal limits   COMPREHENSIVE METABOLIC PANEL W/ REFLEX TO MG FOR LOW K - Abnormal; Notable for the following components:    Sodium 134 (*)     Chloride 96 (*)     BUN 42 (*)     CREATININE 2.9 (*)     GFR Non- 20 (*)     GFR  25 (*)     Total Protein 6.3 (*)     All other components within normal limits URINE RT REFLEX TO CULTURE - Abnormal; Notable for the following components:    Leukocyte Esterase, Urine SMALL (*)     All other components within normal limits   LACTIC ACID, PLASMA - Abnormal; Notable for the following components:    Lactic Acid 4.2 (*)     All other components within normal limits    Narrative:     CALL  Andres  ROCIO tel. ,  Chemistry results called to and read back by [facility] RN in ED, 12/20/2020  16:57, by [address] - Abnormal; Notable for the following components:    Bacteria, UA None Seen (*)     All other components within normal limits   PROCALCITONIN - Abnormal; Notable for the following components:    Procalcitonin 17.99 (*)     All other components within normal limits   C-REACTIVE PROTEIN - Abnormal; Notable for the following components:    CRP 22.39 (*)     All other components within normal limits   FERRITIN - Abnormal; Notable for the following components:    Ferritin 589.2 (*)     All other components within normal limits   APTT - Abnormal; Notable for the following components:    aPTT 40.9 (*)     All other components within normal limits   CBC WITH AUTO DIFFERENTIAL - Abnormal; Notable for the following components:    WBC 16.4 (*)     RBC 3.87 (*)     Hemoglobin 12.3 (*)     Hematocrit 37.9 (*)     MCV 97.9 (*)     MCH 31.8 (*)     MCHC 32.5 (*)     RDW 15.3 (*)     Neutrophils % 93.5 (*)     Lymphocytes % 3.5 (*)     Neutrophils Absolute 15.3 (*)     Lymphocytes Absolute 0.6 (*)     All other components within normal limits   D-DIMER, QUANTITATIVE - Abnormal; Notable for the following components:    D-Dimer, Quant 2.78 (*)     All other components within normal limits   FIBRINOGEN - Abnormal; Notable for the following components:    Fibrinogen 529 (*)     All other components within normal limits   LACTIC ACID, PLASMA - Abnormal; Notable for the following components:    Lactic Acid 4.4 (*)     All other components within normal limits    Narrative: CALL  Iris Heading tel. [phone],  Chemistry results called to and read back by Shanita Del Rio RN on 4th, 12/21/2020  05:29, by Paul Bass - Abnormal; Notable for the following components:    Protime 15.0 (*)     All other components within normal limits   APTT - Abnormal; Notable for the following components:    aPTT 41.4 (*)     All other components within normal limits   CBC WITH AUTO DIFFERENTIAL - Abnormal; Notable for the following components:    RBC 3.71 (*)     Hemoglobin 11.7 (*)     Hematocrit 35.9 (*)     MCV 96.8 (*)     MCH 31.5 (*)     MCHC 32.6 (*)     RDW 15.1 (*)     Neutrophils % 89.1 (*)     Lymphocytes % 5.5 (*)     Neutrophils Absolute 9.2 (*)     Lymphocytes Absolute 0.6 (*)     All other components within normal limits   COMPREHENSIVE METABOLIC PANEL - Abnormal; Notable for the following components:    Sodium 134 (*)     CO2 21 (*)     Glucose 115 (*)     BUN 50 (*)     CREATININE 2.3 (*)     GFR Non- 27 (*)     GFR  32 (*)     Total Protein 5.9 (*)     Alb 3.3 (*)     All other components within normal limits   D-DIMER, QUANTITATIVE - Abnormal; Notable for the following components:    D-Dimer, Quant 2.25 (*)     All other components within normal limits   LACTIC ACID, PLASMA - Abnormal; Notable for the following components:    Lactic Acid 2.0 (*)     All other components within normal limits    Narrative:     Ferguson Query tel. [phone],  Chemistry results called to and read back by Jana Hutchinson on 4th floor,  12/22/2020 12:00, by Mariam Zapata   BLOOD ID PANEL, MOLECULAR - Abnormal; Notable for the following components:    Staphylococcus species by PCR DETECTED (*)     All other components within normal limits    Narrative:     Ferguson Query tel. [phone], 348  Microbiology results called to and read back by Gust Collet, RN, 4th  floor, 12/22/2020 09:16, by Marc Leiva tel. [phone], 295 Microbiology results called to and read back by Sejal Joshua RN,  12/22/2020 11:13, by [facility]   APTT - Abnormal; Notable for the following components:    aPTT 38.2 (*)     All other components within normal limits   CBC WITH AUTO DIFFERENTIAL - Abnormal; Notable for the following components:    RBC 3.59 (*)     Hemoglobin 11.2 (*)     Hematocrit 35.4 (*)     MCV 98.6 (*)     MCH 31.2 (*)     MCHC 31.6 (*)     RDW 15.0 (*)     Neutrophils % 82.9 (*)     Lymphocytes % 10.0 (*)     Lymphocytes Absolute 0.6 (*)     All other components within normal limits   COMPREHENSIVE METABOLIC PANEL - Abnormal; Notable for the following components:    Sodium 135 (*)     Potassium 5.2 (*)     Glucose 110 (*)     BUN 47 (*)     CREATININE 1.9 (*)     GFR Non- 33 (*)     GFR  40 (*)     Total Protein 5.5 (*)     Alb 3.1 (*)     All other components within normal limits   D-DIMER, QUANTITATIVE - Abnormal; Notable for the following components:    D-Dimer, Quant 2.23 (*)     All other components within normal limits   CBC WITH AUTO DIFFERENTIAL - Abnormal; Notable for the following components:    RBC 3.69 (*)     Hemoglobin 11.7 (*)     Hematocrit 35.6 (*)     MCV 96.5 (*)     MCH 31.7 (*)     MCHC 32.9 (*)     Neutrophils % 76.4 (*)     Lymphocytes % 14.0 (*)     Lymphocytes Absolute 0.7 (*)     All other components within normal limits   COMPREHENSIVE METABOLIC PANEL - Abnormal; Notable for the following components:    Sodium 133 (*)     Potassium 5.2 (*)     CO2 19 (*)     Glucose 117 (*)     BUN 46 (*)     CREATININE 1.6 (*)     GFR Non- 40 (*)     GFR  49 (*)     Total Protein 5.5 (*)     Alb 3.1 (*)     All other components within normal limits   D-DIMER, QUANTITATIVE - Abnormal; Notable for the following components:    D-Dimer, Quant 2.00 (*)     All other components within normal limits   PROTIME-INR - Abnormal; Notable for the following components: Protime 15.4 (*)     INR 1.22 (*)     All other components within normal limits   CBC WITH AUTO DIFFERENTIAL - Abnormal; Notable for the following components:    RBC 3.65 (*)     Hemoglobin 11.3 (*)     Hematocrit 35.2 (*)     MCV 96.4 (*)     MCHC 32.1 (*)     MPV 9.3 (*)     Neutrophils % 80.4 (*)     Lymphocytes % 9.2 (*)     Lymphocytes Absolute 0.7 (*)     All other components within normal limits   COMPREHENSIVE METABOLIC PANEL - Abnormal; Notable for the following components:    Sodium 132 (*)     CO2 18 (*)     Glucose 111 (*)     BUN 45 (*)     CREATININE 1.3 (*)     GFR Non- 51 (*)     Total Protein 5.1 (*)     Alb 2.8 (*)     All other components within normal limits   D-DIMER, QUANTITATIVE - Abnormal; Notable for the following components:    D-Dimer, Quant 2.10 (*)     All other components within normal limits   PROTIME-INR - Abnormal; Notable for the following components:    Protime 18.6 (*)     INR 1.54 (*)     All other components within normal limits   STREP PNEUMONIAE ANTIGEN    Narrative:     ORDER#: [id number]                          ORDERED BY: EMMA [place]  SOURCE: Urine Clean Catch                  COLLECTED:  12/21/20 04:15  ANTIBIOTICS AT [facility].:                      RECEIVED :  12/21/20 04:28   CULTURE, BLOOD 1    Narrative:     ORDER#: [id number]                          ORDERED BY: EMMA [place]  SOURCE: Blood r arm                        COLLECTED:  12/24/20 03:48  ANTIBIOTICS AT [facility].:                      RECEIVED :  12/24/20 05:14   CULTURE, BLOOD 2    Narrative:     ORDER#: [id number]                          ORDERED BY: Yasemin Nettles  SOURCE: Blood l hand                       COLLECTED:  12/24/20 04:00  ANTIBIOTICS AT [facility].:                      RECEIVED :  12/24/20 05:15   CULTURE, RESPIRATORY   LEGIONELLA ANTIGEN, URINE   CULTURE, BLOOD 2   CULTURE, BLOOD 1   CULTURE, BLOOD 2   HEMOGLOBIN A1C   LACTATE DEHYDROGENASE   VITAMIN D 25 HYDROXY   SPECIMEN REJECTION FIBRINOGEN   MAGNESIUM   PROTIME-INR   FIBRINOGEN   MAGNESIUM   PROTIME-INR   VANCOMYCIN, RANDOM   APTT   FIBRINOGEN   MAGNESIUM   LACTIC ACID, PLASMA   APTT   FIBRINOGEN   MAGNESIUM   COMPREHENSIVE METABOLIC PANEL   MAGNESIUM   LIPID PANEL   APTT   CBC WITH AUTO DIFFERENTIAL   COMPREHENSIVE METABOLIC PANEL   D-DIMER, QUANTITATIVE   FIBRINOGEN   MAGNESIUM   PROTIME-INR   APTT   CBC WITH AUTO DIFFERENTIAL   COMPREHENSIVE METABOLIC PANEL   D-DIMER, QUANTITATIVE   FIBRINOGEN   MAGNESIUM   PROTIME-INR       All other labs were within normal range or not returned as of this dictation. EMERGENCY DEPARTMENT COURSE and DIFFERENTIAL DIAGNOSIS/MDM:   Vitals:    Vitals:    12/24/20 1857 12/25/20 0039 12/25/20 0716 12/26/20 2048   BP: 132/60 134/72 128/68 (!) 121/58   Pulse: 68 93 96 85   Resp: 16 17 18 20   Temp: 98.1 °F (36.7 °C) 96.9 °F (36.1 °C) 97.1 °F (36.2 °C) 96.5 °F (35.8 °C)   TempSrc: Temporal Temporal     SpO2: 95% 95% 95% 95%   Weight:       Height:               MDM  Number of Diagnoses or Management Options  COVID-19 virus infection  Pneumonitis  Diagnosis management comments: Discussed with Dr. Lurdes Haskins - elba. CRITICAL CARE TIME   Total Critical Care time was 0 minutes, excluding separately reportable procedures. There was a high probability of clinically significant/lifethreatening deterioration in the patient's condition which required my urgent intervention. CONSULTS:  PALLIATIVE CARE EVAL  IP CONSULT TO INFECTIOUS DISEASES    PROCEDURES:  Unless otherwise noted below, none     Procedures    FINAL IMPRESSION      1. Pneumonitis    2. COVID-19 virus infection          DISPOSITION/PLAN   DISPOSITION        PATIENT REFERRED TO:  Hermilo Miller MD  [facility]  [address] [place] [address]  [phone]      He will follow-up with infectious diseases as needed.   CBL      DISCHARGE MEDICATIONS:  Current Discharge Medication List

## 2020-12-21 LAB
APTT: 40.9 SEC (ref 26–36.2)
BASOPHILS ABSOLUTE: 0 K/UL (ref 0–0.2)
BASOPHILS RELATIVE PERCENT: 0.1 % (ref 0–1)
D DIMER: 2.78 UG/ML FEU (ref 0–0.48)
EKG P AXIS: -3 DEGREES
EKG P-R INTERVAL: 182 MS
EKG Q-T INTERVAL: 364 MS
EKG QRS DURATION: 94 MS
EKG QTC CALCULATION (BAZETT): 420 MS
EKG T AXIS: 24 DEGREES
EOSINOPHILS ABSOLUTE: 0 K/UL (ref 0–0.6)
EOSINOPHILS RELATIVE PERCENT: 0 % (ref 0–5)
FIBRINOGEN: 529 MG/DL (ref 238–505)
HCT VFR BLD CALC: 37.9 % (ref 42–52)
HEMOGLOBIN: 12.3 G/DL (ref 14–18)
IMMATURE GRANULOCYTES #: 0.1 K/UL
INR BLD: 1.18 (ref 0.88–1.18)
LACTIC ACID: 4.4 MMOL/L (ref 0.5–1.9)
LYMPHOCYTES ABSOLUTE: 0.6 K/UL (ref 1.1–4.5)
LYMPHOCYTES RELATIVE PERCENT: 3.5 % (ref 20–40)
MCH RBC QN AUTO: 31.8 PG (ref 27–31)
MCHC RBC AUTO-ENTMCNC: 32.5 G/DL (ref 33–37)
MCV RBC AUTO: 97.9 FL (ref 80–94)
MONOCYTES ABSOLUTE: 0.4 K/UL (ref 0–0.9)
MONOCYTES RELATIVE PERCENT: 2.2 % (ref 0–10)
NEUTROPHILS ABSOLUTE: 15.3 K/UL (ref 1.5–7.5)
NEUTROPHILS RELATIVE PERCENT: 93.5 % (ref 50–65)
PDW BLD-RTO: 15.3 % (ref 11.5–14.5)
PLATELET # BLD: 153 K/UL (ref 130–400)
PMV BLD AUTO: 10.2 FL (ref 9.4–12.4)
PROTHROMBIN TIME: 15 SEC (ref 12–14.6)
RBC # BLD: 3.87 M/UL (ref 4.7–6.1)
REASON FOR REJECTION: NORMAL
REJECTED TEST: NORMAL
STREP PNEUMONIAE ANTIGEN, URINE: NORMAL
WBC # BLD: 16.4 K/UL (ref 4.8–10.8)

## 2020-12-21 PROCEDURE — 6360000002 HC RX W HCPCS: Performed by: INTERNAL MEDICINE

## 2020-12-21 PROCEDURE — 1210000000 HC MED SURG R&B

## 2020-12-21 PROCEDURE — 87186 SC STD MICRODIL/AGAR DIL: CPT

## 2020-12-21 PROCEDURE — 87150 DNA/RNA AMPLIFIED PROBE: CPT

## 2020-12-21 PROCEDURE — 6370000000 HC RX 637 (ALT 250 FOR IP): Performed by: INTERNAL MEDICINE

## 2020-12-21 PROCEDURE — 85025 COMPLETE CBC W/AUTO DIFF WBC: CPT

## 2020-12-21 PROCEDURE — 87040 BLOOD CULTURE FOR BACTERIA: CPT

## 2020-12-21 PROCEDURE — 2500000003 HC RX 250 WO HCPCS: Performed by: INTERNAL MEDICINE

## 2020-12-21 PROCEDURE — 83605 ASSAY OF LACTIC ACID: CPT

## 2020-12-21 PROCEDURE — 2580000003 HC RX 258: Performed by: INTERNAL MEDICINE

## 2020-12-21 PROCEDURE — 85384 FIBRINOGEN ACTIVITY: CPT

## 2020-12-21 PROCEDURE — 93010 ELECTROCARDIOGRAM REPORT: CPT | Performed by: INTERNAL MEDICINE

## 2020-12-21 PROCEDURE — 85610 PROTHROMBIN TIME: CPT

## 2020-12-21 PROCEDURE — 2700000000 HC OXYGEN THERAPY PER DAY

## 2020-12-21 PROCEDURE — 87449 NOS EACH ORGANISM AG IA: CPT

## 2020-12-21 PROCEDURE — 85730 THROMBOPLASTIN TIME PARTIAL: CPT

## 2020-12-21 PROCEDURE — 85379 FIBRIN DEGRADATION QUANT: CPT

## 2020-12-21 RX ORDER — DEXAMETHASONE SODIUM PHOSPHATE 4 MG/ML
6 INJECTION, SOLUTION INTRA-ARTICULAR; INTRALESIONAL; INTRAMUSCULAR; INTRAVENOUS; SOFT TISSUE DAILY
Status: DISCONTINUED | OUTPATIENT
Start: 2020-12-21 | End: 2020-12-27 | Stop reason: HOSPADM

## 2020-12-21 RX ADMIN — DOXYCYCLINE 100 MG: 100 INJECTION, POWDER, LYOPHILIZED, FOR SOLUTION INTRAVENOUS at 10:56

## 2020-12-21 RX ADMIN — FINASTERIDE 5 MG: 5 TABLET, FILM COATED ORAL at 11:15

## 2020-12-21 RX ADMIN — HEPARIN SODIUM 5000 UNITS: 5000 INJECTION INTRAVENOUS; SUBCUTANEOUS at 09:35

## 2020-12-21 RX ADMIN — Medication 6000 UNITS: at 11:15

## 2020-12-21 RX ADMIN — SERTRALINE HYDROCHLORIDE 50 MG: 50 TABLET ORAL at 11:15

## 2020-12-21 RX ADMIN — OXYCODONE HYDROCHLORIDE AND ACETAMINOPHEN 500 MG: 500 TABLET ORAL at 11:14

## 2020-12-21 RX ADMIN — BUDESONIDE AND FORMOTEROL FUMARATE DIHYDRATE 2 PUFF: 160; 4.5 AEROSOL RESPIRATORY (INHALATION) at 09:14

## 2020-12-21 RX ADMIN — HEPARIN SODIUM 5000 UNITS: 5000 INJECTION INTRAVENOUS; SUBCUTANEOUS at 21:47

## 2020-12-21 RX ADMIN — DEXAMETHASONE SODIUM PHOSPHATE 6 MG: 4 INJECTION, SOLUTION INTRAMUSCULAR; INTRAVENOUS at 09:12

## 2020-12-21 RX ADMIN — SODIUM CHLORIDE, PRESERVATIVE FREE 1 G: 5 INJECTION INTRAVENOUS at 21:46

## 2020-12-21 RX ADMIN — TAMSULOSIN HYDROCHLORIDE 0.4 MG: 0.4 CAPSULE ORAL at 20:26

## 2020-12-21 RX ADMIN — Medication 10 MG: at 21:47

## 2020-12-21 RX ADMIN — ISOSORBIDE MONONITRATE 30 MG: 30 TABLET, EXTENDED RELEASE ORAL at 07:53

## 2020-12-21 RX ADMIN — PANTOPRAZOLE SODIUM 20 MG: 20 TABLET, DELAYED RELEASE ORAL at 11:15

## 2020-12-21 RX ADMIN — OXYCODONE HYDROCHLORIDE AND ACETAMINOPHEN 500 MG: 500 TABLET ORAL at 20:27

## 2020-12-21 RX ADMIN — CLOPIDOGREL 75 MG: 75 TABLET, FILM COATED ORAL at 11:15

## 2020-12-21 RX ADMIN — HEPARIN SODIUM 5000 UNITS: 5000 INJECTION INTRAVENOUS; SUBCUTANEOUS at 14:11

## 2020-12-21 RX ADMIN — CYANOCOBALAMIN TAB 500 MCG 1000 MCG: 500 TAB at 11:15

## 2020-12-21 RX ADMIN — SODIUM CHLORIDE: 9 INJECTION, SOLUTION INTRAVENOUS at 16:38

## 2020-12-21 RX ADMIN — ASPIRIN 81 MG: 81 TABLET, CHEWABLE ORAL at 11:15

## 2020-12-21 RX ADMIN — DOXYCYCLINE 100 MG: 100 INJECTION, POWDER, LYOPHILIZED, FOR SOLUTION INTRAVENOUS at 21:48

## 2020-12-21 RX ADMIN — PRAVASTATIN SODIUM 20 MG: 20 TABLET ORAL at 20:26

## 2020-12-21 RX ADMIN — TAMSULOSIN HYDROCHLORIDE 0.4 MG: 0.4 CAPSULE ORAL at 11:15

## 2020-12-21 NOTE — PROGRESS NOTES
Patient's hearing aids are in his room with him. Spoke with son earlier who was not sure if he had brought them with him. Pt has a labeled red container on bedside table to place them in while sleeping.      Electronically signed by Delphine Luna RN on 12/21/2020 at 5:31 PM

## 2020-12-21 NOTE — H&P
Bessy Maldonado - History & Physical    11/11  PCP: No primary care provider on file. Date of Admission: 12/20/2020   Date of Service: Pt seen/examined on12/20/2020 and Admitted to Inpatient with expected LOS greater than two midnights due to medical therapy. Chief Complaint:  Dyspnea    History Of Present Illness: The patient is a 80 y.o. male with a past medical history of CAD, CKD 3, hypertension who presented to the ED complaining of dyspnea from skilled nursing facility found to have acute respiratory failure with hypoxia requiring oxygen therapy. Patient noted to have COVID-19 and possible concomitant bacterial pneumonia on chest x-ray. Patient is DNR/DNI. Upon my evaluation, patient is a poor historian but states he thinks he was brought in for difficulty breathing. Appears comfortable on 4 L nasal cannula. Denies pain at this time. 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  Authorizing Provider   acetaminophen (TYLENOL) 325 MG tablet Take 650 mg by mouth every 6 hours as needed for Pain    Historical Provider, MD   EMOLLIENT EX Apply topically 2 times daily Apply to arms and legs    Historical MD Froy nitroGLYCERIN (NITROSTAT) 0.4 MG SL tablet Place 0.4 mg under the tongue every 5 minutes as needed for Chest pain up to max of 3 total doses. If no relief after 1 dose, call 911. Historical Provider, MD   albuterol sulfate HFA (VENTOLIN HFA) 108 (90 Base) MCG/ACT inhaler Inhale 2 puffs into the lungs every 6 hours as needed for Wheezing    Historical Provider, MD   ondansetron (ZOFRAN) 4 MG tablet Take 4 mg by mouth every 8 hours as needed for Nausea or Vomiting    Historical Provider, MD   acyclovir (ZOVIRAX) 5 % ointment Apply topically 2 times daily To lip.     Historical Provider, MD   aspirin 81 MG chewable tablet Take 1 tablet by mouth daily 7/23/20   Helen Robertson MD   isosorbide mononitrate (IMDUR) 30 MG extended release tablet Take 1 tablet by mouth daily 7/23/20 8/22/20  Helen Robertson MD   metoprolol succinate (TOPROL XL) 50 MG extended release tablet Take 1 tablet by mouth daily 7/23/20 8/22/20  Helen Robertson MD   sertraline (ZOLOFT) 50 MG tablet Take 50 mg by mouth daily    Historical Provider, MD   bumetanide (BUMEX) 0.5 MG tablet Take 0.5 mg by mouth daily    Historical Provider, MD   finasteride (PROSCAR) 5 MG tablet Take 1 tablet by mouth daily 2/27/20   Gurjit Fisher MD   tamsulosin [facility]) 0.4 MG capsule Take 1 capsule by mouth 2 times daily 2/26/20   Gurjit Fisher MD   fluticasone (FLONASE) 50 MCG/ACT nasal spray 2 sprays by Each Nostril route nightly as needed for Rhinitis    Historical Provider, MD   loperamide (IMODIUM) 2 MG capsule Take 2 mg by mouth 4 times daily as needed for Diarrhea    Historical Provider, MD   Melatonin 10 MG TABS Take 10 mg by mouth nightly as needed    Historical Provider, MD   polyethylene glycol (MIRALAX) powder Take 17 g by mouth daily as needed (constipation)    Historical Provider, MD   pantoprazole (PROTONIX) 20 MG tablet Take 20 mg by mouth daily    Historical Provider, MD carboxymethylcellulose 1 % ophthalmic solution 1 drop 2 times daily    Historical Provider, MD   tiotropium (SPIRIVA RESPIMAT) 1.25 MCG/ACT AERS inhaler Inhale 1 puff into the lungs daily    Historical Provider, MD   calcium carbonate (TUMS) 500 MG chewable tablet Take 1 tablet by mouth daily as needed for Heartburn    Historical Provider, MD   clopidogrel (PLAVIX) 75 MG tablet Take 75 mg by mouth daily    Historical Provider, MD   vitamin B-12 (CYANOCOBALAMIN) 1000 MCG tablet Take 1,000 mcg by mouth daily    Historical Provider, MD   Cholecalciferol (VITAMIN D3) 2000 units CAPS Take 2,000 Units by mouth daily    Historical Provider, MD   Magnesium Chloride-Calcium  MG Take 1 tablet by mouth nightly    Historical Provider, MD   pravastatin (PRAVACHOL) 20 MG tablet 20 mg nightly  10/14/11   Historical Provider, MD   Ascorbic Acid (VITAMIN C) 500 MG tablet Take 500 mg by mouth 2 times daily     Historical Provider, MD       Allergies:    Ketek [telithromycin]    Social History:    Tobacco:   reports that he has never smoked. He has never used smokeless tobacco.  Alcohol:   reports no history of alcohol use. Illicit Drugs: denies    Family History:      Problem Relation Age of Onset    Diabetes Mother     Heart Disease Mother     Heart Attack Mother     Other Mother         cerebral hemorrhage    Diabetes Father     Other Other         all 5 siblings have diabetes    Other Sister         rheumatic fever       Review of Systems:   Negative except as noted above.         Physical Examination:  /63   Pulse 80   Temp 98.1 °F (36.7 °C) (Oral)   Resp 19   Ht 5' 9\" (1.753 m)   Wt 225 lb (102.1 kg)   SpO2 94%   BMI 33.23 kg/m²      General appearance: Alert  Head: NC/AT  Eyes: conjunctivae/corneas clear  Ears: normal external ears  Neck: supple  Lungs: BLAE   Heart: RRR   Abdomen: BS+  Extremities: no edema  Skin: warm  Neurologic: Alert, gross motor function intact  Psychiatric:  mood appropriate Diagnostic Data:     CBC:  Recent Labs     12/20/20  1607   WBC 16.4*   HGB 12.8*   HCT 38.9*        BMP:  Recent Labs     12/20/20  1607   *   K 4.8   CL 96*   CO2 24   BUN 42*   CREATININE 2.9*   CALCIUM 9.0     Recent Labs     12/20/20  1607   AST 33   ALT 26   BILITOT 0.7   ALKPHOS 74     Coag Panel: No results for input(s): INR, PROTIME, APTT in the last 72 hours. Cardiac Enzymes: No results for input(s): Kennedy Oiler in the last 72 hours. ABGs:  Lab Results   Component Value Date    PHART 7.510 03/07/2020    PO2ART 49.0 03/07/2020    WAD0TTP 34.0 03/07/2020     Urinalysis:  Lab Results   Component Value Date    NITRU Negative 12/20/2020    WBCUA 5 12/20/2020    BACTERIA None Seen 12/20/2020    RBCUA 4 12/20/2020    BLOODU Negative 12/20/2020    SPECGRAV 1.013 12/20/2020    GLUCOSEU Negative 12/20/2020     RAD:     XR CHEST PORTABLE [[id number]] Resulted: 12/20/20 1825      Order Status: Completed Updated: 12/20/20 1827     Narrative:       XR CHEST PORTABLE   12/20/2020 6:24 PM   History: Malaise and fatigue. Cough and exposure. Magnified heart size. Aortic arch calcification. Chronic interstitial lung disease with patchy acute appearing   interstitial infiltrate at the left lower lobe. Upper lobes are clear. No pneumothorax or heart failure.     Impression:       1. Patchy left lower lobe interstitial infiltrate compatible with   pneumonia.    Signed by Dr Izzy Perez on 12/20/2020 6:25 PM           Active Hospital Problems    Diagnosis Date Noted    COVID-19 [U07.1] 12/20/2020    CKD (chronic kidney disease), stage III [N18.30] 12/20/2020    Pneumonia [J18.9] 03/08/2020    [facility] [N17.9] 02/12/2019    Essential hypertension [I10] 02/12/2019    GERD (gastroesophageal reflux disease) [K21.9] 02/12/2019       MPRESSION / PLAN:  Principal Problem:    COVID-19  Active Problems:    LI[facility] ([facility]) [facility] GERD (gastroesophageal reflux disease)    Essential hypertension    Pneumonia    CKD (chronic kidney disease), stage III  Resolved Problems:    * No resolved hospital problems. *    COVID-19: Decadron. Hold remdesivir for renal function. IV fluids. Supportive management. O2 as needed. Likely overlying bacterial pneumonia: Rocephin/azithromycin. Sputum culture. LIU on CKD 3: IV fluids. Monitor BMP. Lactic acidosis: IVF. Trend. CAD: Recent admission for chest pain. Placed on dual antiplatelet therapy. Continue statin. Poor candidate for invasive diagnostic/therapeutic intervention based on previous cardiology note. Currently denies chest pain. Hypertension: Monitor BP and adjust medications as needed. Supportive management.   DVT ppx: Heparin  DNR/DNI    Carey Johnson MD  12/20/2020

## 2020-12-21 NOTE — PROGRESS NOTES
Palliative Care/Spiritual Care: Spoke with pt's son Ana Luis who says pt  Had a stroke around six years ago and was cared for at home until about two and a half years ago and went to [address] [place] [address] at that time and has been there since then. Pt is Covid positive. Pt is known to palliative care. Advance Directives: Pt has a LW with his son Ana Luis listed as primary decision maker. Pt is a DNR; SEE ACP NOTE. Pain/other symptoms: Unable to assess pain at this time. Social/Spiritual: Pt attended [facility] all his life according to his son Kaylee Gonzalez. Pt/family discussion r/t goals: Pt has three sons, grandchildren, and great-grandchildren. Pt lives at [facility] and son says pt sat in his chair and is paralyzed on the left side from the stroke. Pt ambulated very little but now is total care. Goal is for pt to return to [facility] if he is able. Provided spiritual care with support, and prayer. Pt's son expressed gratitude for spiritual care.     Electronically signed by Rm Smith on 12/21/2020 at 11:29 AM

## 2020-12-21 NOTE — PROGRESS NOTES
[facility]        Hospitalist Progress Note  12/21/2020 10:22 AM  Subjective:   Admit Date: 12/20/2020  PCP: No primary care provider on file. Chief Complaint: Dyspnea    Subjective: Patient seen at bedside. Appears comfortable. More talkative than yesterday. Thinks his breathing is improving. Denies chest pain. Upset that someone lost his hearing aid. [facility]: The patient is a 80 y.o. male with a past medical history of CAD, CKD 3, hypertension who presented to the ED complaining of dyspnea from skilled nursing facility found to have acute respiratory failure with hypoxia requiring oxygen therapy. Patient noted to have COVID-19 and possible concomitant bacterial pneumonia on chest x-ray. Patient is DNR/DNI. ROS: 14 point review of systems is negative except as specifically addressed above. DIET RENAL;     Intake/Output Summary (Last 24 hours) at 12/21/2020 1022  Last data filed at 12/21/2020 0802  Gross per 24 hour   Intake 546 ml   Output 1250 ml   Net -704 ml     Medications:   sodium chloride 100 mL/hr at 12/20/20 2354     Current Facility-Administered Medications   Medication Dose Route Frequency Provider Last Rate Last Admin    dexamethasone (DECADRON) injection 6 mg  6 mg Intravenous Daily Kassandra De La Rosa MD   6 mg at 12/21/20 0912    0.9 % sodium chloride infusion   Intravenous Continuous Kassandra De La Rosa  mL/hr at 12/20/20 2354 New Bag at 12/20/20 2354    albuterol sulfate  (90 Base) MCG/ACT inhaler 2 puff  2 puff Inhalation Q6H PRN Kassandra De La Rosa MD        budesonide-formoterol [facility]) 160-4.5 MCG/ACT inhaler 2 puff  2 puff Inhalation BID Kassandra De La Rosa MD   2 puff at 12/21/20 0914    cefTRIAXone (ROCEPHIN) 1 g in sodium chloride (PF) 10 mL IV syringe  1 g Intravenous Q24H Kassandra De La Rosa MD   1 g at 12/20/20 2350  doxycycline (VIBRAMYCIN) 100 mg in dextrose 5 % 100 mL IVPB  100 mg Intravenous Q12H Katey Mariee MD   Stopped at 12/21/20 0204    ascorbic acid (VITAMIN C) tablet 500 mg  500 mg Oral BID Katey Mariee MD   500 mg at 12/20/20 2349    calcium carbonate (TUMS) chewable tablet 500 mg  500 mg Oral Daily PRN Katey Mariee MD        clopidogrel (PLAVIX) tablet 75 mg  75 mg Oral Daily Katey Mariee MD   75 mg at 12/20/20 2354    aspirin chewable tablet 81 mg  81 mg Oral Daily Katey Mariee MD        finasteride (PROSCAR) tablet 5 mg  5 mg Oral Daily Katey Mariee MD        fluticasone [facility]) 50 MCG/ACT nasal spray 2 spray  2 spray Each Nostril Nightly PRN Katey Mariee MD        melatonin disintegrating tablet 10 mg  10 mg Oral Nightly PRN Katey Mariee MD        pantoprazole (PROTONIX) tablet 20 mg  20 mg Oral Daily Katey Mariee MD        pravastatin (PRAVACHOL) tablet 20 mg  20 mg Oral Nightly Katey Mariee MD   20 mg at 12/20/20 2353    sertraline (ZOLOFT) tablet 50 mg  50 mg Oral Daily Katey Mariee MD        tamsulosin [facility]) capsule 0.4 mg  0.4 mg Oral BID Katey Mariee MD   0.4 mg at 12/20/20 2349    vitamin B-12 (CYANOCOBALAMIN) tablet 1,000 mcg  1,000 mcg Oral Daily Katey Mariee MD        isosorbide mononitrate (IMDUR) extended release tablet 30 mg  30 mg Oral Daily Katey Mariee MD        acetaminophen (TYLENOL) tablet 650 mg  650 mg Oral Q6H PRN Katey Mariee MD        Or    acetaminophen (TYLENOL) suppository 650 mg  650 mg Rectal Q6H PRN Katey Mairee MD        heparin (porcine) injection 5,000 Units  5,000 Units Subcutaneous 3 times per day Katey Mariee MD   5,000 Units at 12/21/20 0935    guaiFENesin-dextromethorphan (ROBITUSSIN DM) 100-10 MG/5ML syrup 5 mL  5 mL Oral Q4H PRN Katey Mariee MD        Vitamin D (CHOLECALCIFEROL) tablet 6,000 Units  6,000 Units Oral Daily Katey Mariee MD   6,000 Units at 12/20/20 2352  potassium chloride (KLOR-CON M) extended release tablet 40 mEq  40 mEq Oral PRN Nasra Fall MD        Or    potassium bicarb-citric acid (EFFER-K) effervescent tablet 40 mEq  40 mEq Oral PRN Nasra Fall MD        Or    potassium chloride 10 mEq/100 mL IVPB (Peripheral Line)  10 mEq Intravenous PRN Nasra Fall MD        magnesium sulfate 1 g in dextrose 5% 100 mL IVPB  1 g Intravenous PRN Nasra Fall MD            Labs:     Recent Labs     12/20/20  1607 12/21/20  0430   WBC 16.4* 16.4*   RBC 4.04* 3.87*   HGB 12.8* 12.3*   HCT 38.9* 37.9*   MCV 96.3* 97.9*   MCH 31.7* 31.8*   MCHC 32.9* 32.5*    153     Recent Labs     12/20/20  1607   *   K 4.8   ANIONGAP 14   CL 96*   CO2 24   BUN 42*   CREATININE 2.9*   GLUCOSE 96   CALCIUM 9.0     No results for input(s): MG, PHOS in the last 72 hours. Recent Labs     12/20/20  1607   AST 33   ALT 26   BILITOT 0.7   ALKPHOS 74     ABGs:No results for input(s): PH, PO2, PCO2, HCO3, BE, O2SAT in the last 72 hours. Troponin T: No results for input(s): TROPONINI in the last 72 hours.   INR:   Recent Labs     12/21/20  0430   INR 1.18     Lactic Acid:   Recent Labs     12/21/20  0430   LACTA 4.4*       Objective:   Vitals: BP (!) 123/57   Pulse 89   Temp 97.7 °F (36.5 °C) (Temporal)   Resp 22   Ht 5' 9\" (1.753 m)   Wt 228 lb 6.4 oz (103.6 kg)   SpO2 93%   BMI 33.73 kg/m²   24HR INTAKE/OUTPUT:      Intake/Output Summary (Last 24 hours) at 12/21/2020 1022  Last data filed at 12/21/2020 1288  Gross per 24 hour   Intake 546 ml   Output 1250 ml   Net -704 ml     General appearance: Alert  Head: NC/AT  Eyes: conjunctivae/corneas clear  Ears: normal external ears  Neck: supple  Lungs: BLAE   Heart: RRR   Abdomen: BS+  Extremities: no edema  Skin: warm  Neurologic: Alert, gross motor function intact  Psychiatric:  mood appropriate    Assessment and Plan:   Principal Problem:    COVID-19  Active Problems:    [facility] GERD (gastroesophageal reflux disease)    Essential hypertension    Pneumonia    CKD (chronic kidney disease), stage III  Resolved Problems:    * No resolved hospital problems. *    COVID-19: Decadron. Hold remdesivir for renal function - repeat labs pending today. IV fluids. Supportive management. O2 as needed.     Likely overlying bacterial pneumonia: Rocephin/doxycycline. Sputum culture.     LIU on CKD 3: IV fluids. Monitor BMP. No labs today.     Lactic acidosis: IVF. Trend.     CAD: Recent admission for chest pain. Placed on dual antiplatelet therapy. Continue statin. Poor candidate for invasive diagnostic/therapeutic intervention based on previous cardiology note. Denies chest pain.     Hypertension: Monitor BP and adjust medications as needed.      Supportive management.     Advance Directive: DNR-CC    DVT prophylaxis: Heparin    Discharge planning: TBD      Signed:  Wilmar Smith MD 12/21/2020 10:22 AM  Rounding Hospitalist

## 2020-12-21 NOTE — ED NOTES
Attempted to call fourth floor. Nurse states that it is not a good time. ED charge nurse aware. Pt in no distress at this time. Pt is resting comfortably.  Blankets applied      [facility]  12/20/20 2708

## 2020-12-21 NOTE — CARE COORDINATION
Pt has a bedhold at [facility] and can return at [place].    [facility]  [phone]  [phone]  Electronically signed by Chavez Menjivar on 12/21/2020 at 11:24 AM

## 2020-12-21 NOTE — PLAN OF CARE
Problem: Airway Clearance - Ineffective  Goal: Achieve or maintain patent airway  Outcome: Ongoing     Problem: Gas Exchange - Impaired  Goal: Absence of hypoxia  Outcome: Ongoing  Goal: Promote optimal lung function  Outcome: Ongoing     Problem: Breathing Pattern - Ineffective  Goal: Ability to achieve and maintain a regular respiratory rate  Outcome: Ongoing     Problem:  Body Temperature -  Risk of, Imbalanced  Goal: Ability to maintain a body temperature within defined limits  Outcome: Ongoing  Goal: Will regain or maintain usual level of consciousness  Outcome: Ongoing  Goal: Complications related to the disease process, condition or treatment will be avoided or minimized  Outcome: Ongoing     Problem: Isolation Precautions - Risk of Spread of Infection  Goal: Prevent transmission of infection  Outcome: Ongoing     Problem: Nutrition Deficits  Goal: Optimize nutrtional status  Outcome: Ongoing     Problem: Risk for Fluid Volume Deficit  Goal: Maintain normal heart rhythm  Outcome: Ongoing  Goal: Maintain absence of muscle cramping  Outcome: Ongoing  Goal: Maintain normal serum potassium, sodium, calcium, phosphorus, and pH  Outcome: Ongoing     Problem: Loneliness or Risk for Loneliness  Goal: Demonstrate positive use of time alone when socialization is not possible  Outcome: Ongoing     Problem: Fatigue  Goal: Verbalize increase energy and improved vitality  Outcome: Ongoing     Problem: Patient Education: Go to Patient Education Activity  Goal: Patient/Family Education  Outcome: Ongoing     Problem: Skin Integrity:  Goal: Will show no infection signs and symptoms  Description: Will show no infection signs and symptoms  Outcome: Ongoing  Goal: Absence of new skin breakdown  Description: Absence of new skin breakdown  Outcome: Ongoing     Problem: Falls - Risk of:  Goal: Will remain free from falls  Description: Will remain free from falls  Outcome: Ongoing  Goal: Absence of physical injury Description: Absence of physical injury  Outcome: Ongoing

## 2020-12-21 NOTE — PROGRESS NOTES
Attempted to obtain labs as ordered. Was able to obtain first set of blood cultures but unable to get second set or CMP. Dr. Gene Sevilla notified.      Electronically signed by Faustino Sevilla RN on 12/21/2020 at 5:27 PM

## 2020-12-21 NOTE — ACP (ADVANCE CARE PLANNING)
Advance Care Planning     Advance Care Planning Activator (Inpatient)  Conversation Note      Date of ACP Conversation: 12/21/2020    Conversation Conducted with: Pt's son Evan Muller    ACP Activator: Fito Toledo       [facility] Decision Maker:   Primary Decision Maker: Jack Gautam - Child - [phone]    Secondary Decision Maker: ANNA Ruffin [phone]      Care Preferences    Ventilation: \"If you were in your present state of health and suddenly became very ill and were unable to breathe on your own, what would your preference be about the use of a ventilator (breathing machine) if it were available to you? \"      Would the patient desire the use of ventilator (breathing machine)?: No    \"If your health worsens and it becomes clear that your chance of recovery is unlikely, what would your preference be about the use of a ventilator (breathing machine) if it were available to you? \"     Would the patient desire the use of ventilator (breathing machine)?: No      Resuscitation  \"CPR works best to restart the heart when there is a sudden event, like a heart attack, in someone who is otherwise healthy. Unfortunately, CPR does not typically restart the heart for people who have serious health conditions or who are very sick. \"    \"In the event your heart stopped as a result of an underlying serious health condition, would you want attempts to be made to restart your heart (answer \"yes\" for attempt to resuscitate) or would you prefer a natural death (answer \"no\" for do not attempt to resuscitate)? \" No       [] Yes   [x] No   Educated Patient / Get Márquez regarding differences between Advance Directives and portable DNR orders.       Conversation Outcomes:  [x] ACP discussion completed    Electronically signed by Fito Toledo on 12/21/2020 at 11:31 AM

## 2020-12-22 LAB
ACINETOBACTER BAUMANNII BY PCR: NOT DETECTED
ALBUMIN SERPL-MCNC: 3.3 G/DL (ref 3.5–5.2)
ALP BLD-CCNC: 61 U/L (ref 40–130)
ALT SERPL-CCNC: 21 U/L (ref 5–41)
ANION GAP SERPL CALCULATED.3IONS-SCNC: 9 MMOL/L (ref 7–19)
APTT: 41.4 SEC (ref 26–36.2)
AST SERPL-CCNC: 28 U/L (ref 5–40)
BASOPHILS ABSOLUTE: 0 K/UL (ref 0–0.2)
BASOPHILS RELATIVE PERCENT: 0.1 % (ref 0–1)
BILIRUB SERPL-MCNC: 0.4 MG/DL (ref 0.2–1.2)
BUN BLDV-MCNC: 50 MG/DL (ref 8–23)
CALCIUM SERPL-MCNC: 8.9 MG/DL (ref 8.2–9.6)
CANDIDA ALBICANS BY PCR: NOT DETECTED
CANDIDA GLABRATA BY PCR: NOT DETECTED
CANDIDA KRUSEI BY PCR: NOT DETECTED
CANDIDA PARAPSILOSIS BY PCR: NOT DETECTED
CANDIDA TROPICALIS BY PCR: NOT DETECTED
CHLORIDE BLD-SCNC: 104 MMOL/L (ref 98–111)
CO2: 21 MMOL/L (ref 22–29)
CREAT SERPL-MCNC: 2.3 MG/DL (ref 0.5–1.2)
D DIMER: 2.25 UG/ML FEU (ref 0–0.48)
ENTEROBACTER CLOACAE COMPLEX BY PCR: NOT DETECTED
ENTEROBACTERALES BY PCR: NOT DETECTED
ENTEROCOCCUS BY PCR: NOT DETECTED
EOSINOPHILS ABSOLUTE: 0 K/UL (ref 0–0.6)
EOSINOPHILS RELATIVE PERCENT: 0 % (ref 0–5)
ESCHERICHIA COLI BY PCR: NOT DETECTED
FIBRINOGEN: 426 MG/DL (ref 238–505)
GFR AFRICAN AMERICAN: 32
GFR NON-AFRICAN AMERICAN: 27
GLUCOSE BLD-MCNC: 115 MG/DL (ref 74–109)
HAEMOPHILUS INFLUENZAE BY PCR: NOT DETECTED
HCT VFR BLD CALC: 35.9 % (ref 42–52)
HEMOGLOBIN: 11.7 G/DL (ref 14–18)
IMMATURE GRANULOCYTES #: 0.2 K/UL
INR BLD: 1.07 (ref 0.88–1.18)
KLEBSIELLA OXYTOCA BY PCR: NOT DETECTED
KLEBSIELLA PNEUMONIAE GROUP BY PCR: NOT DETECTED
LACTIC ACID: 2 MMOL/L (ref 0.5–1.9)
LISTERIA MONOCYTOGENES BY PCR: NOT DETECTED
LYMPHOCYTES ABSOLUTE: 0.6 K/UL (ref 1.1–4.5)
LYMPHOCYTES RELATIVE PERCENT: 5.5 % (ref 20–40)
MAGNESIUM: 2.1 MG/DL (ref 1.7–2.3)
MCH RBC QN AUTO: 31.5 PG (ref 27–31)
MCHC RBC AUTO-ENTMCNC: 32.6 G/DL (ref 33–37)
MCV RBC AUTO: 96.8 FL (ref 80–94)
METHICILLIN RESISTANCE MECA/C  BY PCR: NOT DETECTED
MONOCYTES ABSOLUTE: 0.4 K/UL (ref 0–0.9)
MONOCYTES RELATIVE PERCENT: 3.9 % (ref 0–10)
NEISSERIA MENINGITIDIS BY PCR: NOT DETECTED
NEUTROPHILS ABSOLUTE: 9.2 K/UL (ref 1.5–7.5)
NEUTROPHILS RELATIVE PERCENT: 89.1 % (ref 50–65)
PDW BLD-RTO: 15.1 % (ref 11.5–14.5)
PLATELET # BLD: 155 K/UL (ref 130–400)
PMV BLD AUTO: 9.5 FL (ref 9.4–12.4)
POTASSIUM SERPL-SCNC: 4.9 MMOL/L (ref 3.5–5)
PROTEUS SPECIES BY PCR: NOT DETECTED
PROTHROMBIN TIME: 13.8 SEC (ref 12–14.6)
PSEUDOMONAS AERUGINOSA BY PCR: NOT DETECTED
RBC # BLD: 3.71 M/UL (ref 4.7–6.1)
SERRATIA MARCESCENS BY PCR: NOT DETECTED
SODIUM BLD-SCNC: 134 MMOL/L (ref 136–145)
STAPHYLOCOCCUS AUREUS BY PCR: NOT DETECTED
STAPHYLOCOCCUS SPECIES BY PCR: DETECTED
STREPTOCOCCUS AGALACTIAE BY PCR: NOT DETECTED
STREPTOCOCCUS PNEUMONIAE BY PCR: NOT DETECTED
STREPTOCOCCUS PYOGENES  BY PCR: NOT DETECTED
STREPTOCOCCUS SPECIES BY PCR: NOT DETECTED
TOTAL PROTEIN: 5.9 G/DL (ref 6.6–8.7)
WBC # BLD: 10.4 K/UL (ref 4.8–10.8)

## 2020-12-22 PROCEDURE — 85025 COMPLETE CBC W/AUTO DIFF WBC: CPT

## 2020-12-22 PROCEDURE — 2580000003 HC RX 258: Performed by: INTERNAL MEDICINE

## 2020-12-22 PROCEDURE — 6360000002 HC RX W HCPCS: Performed by: INTERNAL MEDICINE

## 2020-12-22 PROCEDURE — 85730 THROMBOPLASTIN TIME PARTIAL: CPT

## 2020-12-22 PROCEDURE — 80053 COMPREHEN METABOLIC PANEL: CPT

## 2020-12-22 PROCEDURE — 83605 ASSAY OF LACTIC ACID: CPT

## 2020-12-22 PROCEDURE — 6370000000 HC RX 637 (ALT 250 FOR IP): Performed by: INTERNAL MEDICINE

## 2020-12-22 PROCEDURE — 85379 FIBRIN DEGRADATION QUANT: CPT

## 2020-12-22 PROCEDURE — 2700000000 HC OXYGEN THERAPY PER DAY

## 2020-12-22 PROCEDURE — 85610 PROTHROMBIN TIME: CPT

## 2020-12-22 PROCEDURE — 83735 ASSAY OF MAGNESIUM: CPT

## 2020-12-22 PROCEDURE — 1210000000 HC MED SURG R&B

## 2020-12-22 PROCEDURE — 85384 FIBRINOGEN ACTIVITY: CPT

## 2020-12-22 RX ADMIN — TAMSULOSIN HYDROCHLORIDE 0.4 MG: 0.4 CAPSULE ORAL at 21:15

## 2020-12-22 RX ADMIN — DEXAMETHASONE SODIUM PHOSPHATE 6 MG: 4 INJECTION, SOLUTION INTRAMUSCULAR; INTRAVENOUS at 09:40

## 2020-12-22 RX ADMIN — VANCOMYCIN HYDROCHLORIDE 1500 MG: 10 INJECTION, POWDER, LYOPHILIZED, FOR SOLUTION INTRAVENOUS at 12:16

## 2020-12-22 RX ADMIN — HEPARIN SODIUM 5000 UNITS: 5000 INJECTION INTRAVENOUS; SUBCUTANEOUS at 14:12

## 2020-12-22 RX ADMIN — CLOPIDOGREL 75 MG: 75 TABLET, FILM COATED ORAL at 09:41

## 2020-12-22 RX ADMIN — TAMSULOSIN HYDROCHLORIDE 0.4 MG: 0.4 CAPSULE ORAL at 09:41

## 2020-12-22 RX ADMIN — ISOSORBIDE MONONITRATE 30 MG: 30 TABLET, EXTENDED RELEASE ORAL at 09:41

## 2020-12-22 RX ADMIN — ASPIRIN 81 MG: 81 TABLET, CHEWABLE ORAL at 09:41

## 2020-12-22 RX ADMIN — Medication 10 MG: at 21:16

## 2020-12-22 RX ADMIN — SODIUM CHLORIDE, PRESERVATIVE FREE 1 G: 5 INJECTION INTRAVENOUS at 23:30

## 2020-12-22 RX ADMIN — OXYCODONE HYDROCHLORIDE AND ACETAMINOPHEN 500 MG: 500 TABLET ORAL at 21:16

## 2020-12-22 RX ADMIN — PRAVASTATIN SODIUM 20 MG: 20 TABLET ORAL at 21:16

## 2020-12-22 RX ADMIN — HEPARIN SODIUM 5000 UNITS: 5000 INJECTION INTRAVENOUS; SUBCUTANEOUS at 21:15

## 2020-12-22 RX ADMIN — Medication 6000 UNITS: at 09:41

## 2020-12-22 RX ADMIN — PANTOPRAZOLE SODIUM 20 MG: 20 TABLET, DELAYED RELEASE ORAL at 09:40

## 2020-12-22 RX ADMIN — BUDESONIDE AND FORMOTEROL FUMARATE DIHYDRATE 2 PUFF: 160; 4.5 AEROSOL RESPIRATORY (INHALATION) at 09:39

## 2020-12-22 RX ADMIN — CYANOCOBALAMIN TAB 500 MCG 1000 MCG: 500 TAB at 09:40

## 2020-12-22 RX ADMIN — SODIUM CHLORIDE, PRESERVATIVE FREE 1 G: 5 INJECTION INTRAVENOUS at 12:16

## 2020-12-22 RX ADMIN — FINASTERIDE 5 MG: 5 TABLET, FILM COATED ORAL at 09:40

## 2020-12-22 RX ADMIN — HEPARIN SODIUM 5000 UNITS: 5000 INJECTION INTRAVENOUS; SUBCUTANEOUS at 06:19

## 2020-12-22 RX ADMIN — FLUTICASONE PROPIONATE 2 SPRAY: 50 SPRAY, METERED NASAL at 09:39

## 2020-12-22 RX ADMIN — SERTRALINE HYDROCHLORIDE 50 MG: 50 TABLET ORAL at 09:40

## 2020-12-22 RX ADMIN — OXYCODONE HYDROCHLORIDE AND ACETAMINOPHEN 500 MG: 500 TABLET ORAL at 09:40

## 2020-12-22 RX ADMIN — BUDESONIDE AND FORMOTEROL FUMARATE DIHYDRATE 2 PUFF: 160; 4.5 AEROSOL RESPIRATORY (INHALATION) at 21:00

## 2020-12-22 NOTE — PLAN OF CARE
Problem: Airway Clearance - Ineffective  Goal: Achieve or maintain patent airway  12/22/2020 0430 by Bobbi Amador RN  Outcome: Ongoing  12/21/2020 1526 by Gerard Jaimes RN  Outcome: Ongoing     Problem: Gas Exchange - Impaired  Goal: Absence of hypoxia  12/22/2020 0430 by Bobbi Amador RN  Outcome: Ongoing  12/21/2020 1526 by Gerard Jaimes RN  Outcome: Ongoing  Goal: Promote optimal lung function  12/22/2020 0430 by Bobbi Amador RN  Outcome: Ongoing  12/21/2020 1526 by Gerard Jaimes RN  Outcome: Ongoing     Problem: Breathing Pattern - Ineffective  Goal: Ability to achieve and maintain a regular respiratory rate  12/22/2020 0430 by Bobbi Amador RN  Outcome: Ongoing  12/21/2020 1526 by Gerard Jaimes RN  Outcome: Ongoing     Problem:  Body Temperature -  Risk of, Imbalanced  Goal: Ability to maintain a body temperature within defined limits  12/22/2020 0430 by Bobbi Amador RN  Outcome: Ongoing  12/21/2020 1526 by Gerard Jaimes RN  Outcome: Ongoing  Goal: Will regain or maintain usual level of consciousness  12/22/2020 0430 by Bobbi Amador RN  Outcome: Ongoing  12/21/2020 1526 by Gerard Jaimes RN  Outcome: Ongoing  Goal: Complications related to the disease process, condition or treatment will be avoided or minimized  12/22/2020 0430 by Bobbi Amador RN  Outcome: Ongoing  12/21/2020 1526 by Gerard Jaimes RN  Outcome: Ongoing     Problem: Isolation Precautions - Risk of Spread of Infection  Goal: Prevent transmission of infection  12/22/2020 0430 by Bobbi Amador RN  Outcome: Ongoing  12/21/2020 1526 by Gerard Jaimes RN  Outcome: Ongoing     Problem: Nutrition Deficits  Goal: Optimize nutrtional status  12/22/2020 0430 by Bobbi Amador RN  Outcome: Ongoing  12/21/2020 1526 by Gerard Jaimes RN  Outcome: Ongoing     Problem: Risk for Fluid Volume Deficit  Goal: Maintain normal heart rhythm  12/22/2020 0430 by Bobbi Amador RN  Outcome: Ongoing

## 2020-12-22 NOTE — PROGRESS NOTES
Pharmacy Note  Vancomycin Consult    Siddhartha Hernandez is a 80 y.o. male started on Vancomycin for PNA/positive blood culture; consult received from Dr. Jan Smith to manage therapy. Also receiving the following antibiotics: cefepime. Principal Problem:    COVID-19  Active Problems:    LIU (acute kidney injury) ([address]    GERD (gastroesophageal reflux disease)    Essential hypertension    Pneumonia    CKD (chronic kidney disease), stage III  Resolved Problems:    * No resolved hospital problems. *      Allergies:  Ketek [telithromycin]     Temp max: 97.3    Recent Labs     12/20/20  1607   BUN 42*       Recent Labs     12/20/20  1607   CREATININE 2.9*       Recent Labs     12/20/20  1607 12/21/20  0430   WBC 16.4* 16.4*         Intake/Output Summary (Last 24 hours) at 12/22/2020 1056  Last data filed at 12/22/2020 9578  Gross per 24 hour   Intake 2613 ml   Output 550 ml   Net 2063 ml       Culture Date Source Results   12/21/20 Blood Gram positive cocci in chains and/or pairs   resembling Streptococcus        Ht Readings from Last 1 Encounters:   12/20/20 5' 9\" (1.753 m)        Wt Readings from Last 1 Encounters:   12/21/20 230 lb 14.4 oz (104.7 kg)         Body mass index is 34.1 kg/m². Estimated Creatinine Clearance: 18 mL/min (A) (based on SCr of 2.9 mg/dL (H)). Assessment/Plan:  Will initiate vancomycin pulse dosing. Will give vancomycin 1500 mg IV once. Timing of trough level will be determined based on culture results, renal function, and clinical response. Thank you for the consult. Will continue to follow.     Electronically signed by Kiarra Rosario [address] [place] [address] on 12/22/2020 at 10:56 AM

## 2020-12-22 NOTE — PROGRESS NOTES
[facility]        Hospitalist Progress Note  12/22/2020 9:44 AM  Subjective:   Admit Date: 12/20/2020  PCP: No primary care provider on file. Chief Complaint: Dyspnea    Subjective: Patient seen at bedside. Symptomatically improving. Wants to know when he will be able to leave the hospital - telling me about the cold front that supposed to be coming through. Cumulative Hospital History: The patient is a 80 y.o. male with a past medical history of CAD, CKD 3, hypertension who presented to the ED complaining of dyspnea from skilled nursing facility found to have acute respiratory failure with hypoxia requiring oxygen therapy. Patient noted to have COVID-19 and possible concomitant bacterial pneumonia on chest x-ray. Patient is DNR/DNI. ROS: 14 point review of systems is negative except as specifically addressed above. DIET RENAL;     Intake/Output Summary (Last 24 hours) at 12/22/2020 0944  Last data filed at 12/22/2020 0425  Gross per 24 hour   Intake    Output 550 ml   Net -550 ml     Medications:   sodium chloride 100 mL/hr at 12/21/20 1638     Current Facility-Administered Medications   Medication Dose Route Frequency Provider Last Rate Last Admin    dexamethasone (DECADRON) injection 6 mg  6 mg Intravenous Daily Kym Cintron MD   6 mg at 12/22/20 0940    0.9 % sodium chloride infusion   Intravenous Continuous Kym Cintron  mL/hr at 12/21/20 1638 New Bag at 12/21/20 1638    albuterol sulfate  (90 Base) MCG/ACT inhaler 2 puff  2 puff Inhalation Q6H PRN Kym Cintron MD        budesonide-formoterol [facility]) 160-4.5 MCG/ACT inhaler 2 puff  2 puff Inhalation BID Kym Cintron MD   2 puff at 12/22/20 0939    ascorbic acid (VITAMIN C) tablet 500 mg  500 mg Oral BID Kym Cintron MD   500 mg at 12/22/20 0940    calcium carbonate (TUMS) chewable tablet 500 mg  500 mg Oral Daily PRN Kym Cintron MD  clopidogrel (PLAVIX) tablet 75 mg  75 mg Oral Daily Chris Roberts MD   75 mg at 12/22/20 0941    aspirin chewable tablet 81 mg  81 mg Oral Daily Chris Roberts MD   81 mg at 12/22/20 0941    finasteride (PROSCAR) tablet 5 mg  5 mg Oral Daily Chris Roberts MD   5 mg at 12/22/20 0940    fluticasone (FLONASE) 50 MCG/ACT nasal spray 2 spray  2 spray Each Nostril Nightly PRN Chris Roberts MD   2 spray at 12/22/20 1280    melatonin disintegrating tablet 10 mg  10 mg Oral Nightly PRN Chris Roberts MD   10 mg at 12/21/20 2147    pantoprazole (PROTONIX) tablet 20 mg  20 mg Oral Daily Chris Roberts MD   20 mg at 12/22/20 0940    pravastatin (PRAVACHOL) tablet 20 mg  20 mg Oral Nightly Chris Roberts MD   20 mg at 12/21/20 2026    sertraline (ZOLOFT) tablet 50 mg  50 mg Oral Daily Chris Roberts MD   50 mg at 12/22/20 0940    tamsulosin (FLOMAX) capsule 0.4 mg  0.4 mg Oral BID Chris Roberts MD   0.4 mg at 12/22/20 0714    vitamin B-12 (CYANOCOBALAMIN) tablet 1,000 mcg  1,000 mcg Oral Daily Chris Roberts MD   1,000 mcg at 12/22/20 0940    isosorbide mononitrate (IMDUR) extended release tablet 30 mg  30 mg Oral Daily Chris Roberts MD   30 mg at 12/22/20 0941    acetaminophen (TYLENOL) tablet 650 mg  650 mg Oral Q6H PRN Chris Roberts MD        Or    acetaminophen (TYLENOL) suppository 650 mg  650 mg Rectal Q6H PRN Chris Roberts MD        heparin (porcine) injection 5,000 Units  5,000 Units Subcutaneous 3 times per day Chris Roberts MD   5,000 Units at 12/22/20 0619    guaiFENesin-dextromethorphan (ROBITUSSIN DM) 100-10 MG/5ML syrup 5 mL  5 mL Oral Q4H PRN Chris Roberts MD        Vitamin D (CHOLECALCIFEROL) tablet 6,000 Units  6,000 Units Oral Daily Chris Roberts MD   6,000 Units at 12/22/20 0941    potassium chloride (KLOR-CON M) extended release tablet 40 mEq  40 mEq Oral PRN Chris Roberts MD        Or    potassium bicarb-citric acid (EFFER-K) effervescent tablet 40 mEq  40 mEq Oral PRN Chris Roberts MD Or    potassium chloride 10 mEq/100 mL IVPB (Peripheral Line)  10 mEq Intravenous PRN Jacques Gatica MD        magnesium sulfate 1 g in dextrose 5% 100 mL IVPB  1 g Intravenous PRN Jacques Gatica MD            Labs:     Recent Labs     12/20/20  1607 12/21/20  0430   WBC 16.4* 16.4*   RBC 4.04* 3.87*   HGB 12.8* 12.3*   HCT 38.9* 37.9*   MCV 96.3* 97.9*   MCH 31.7* 31.8*   MCHC 32.9* 32.5*    153     Recent Labs     12/20/20  1607   *   K 4.8   ANIONGAP 14   CL 96*   CO2 24   BUN 42*   CREATININE 2.9*   GLUCOSE 96   CALCIUM 9.0     No results for input(s): MG, PHOS in the last 72 hours. Recent Labs     12/20/20  1607   AST 33   ALT 26   BILITOT 0.7   ALKPHOS 74     ABGs:No results for input(s): PH, PO2, PCO2, HCO3, BE, O2SAT in the last 72 hours. Troponin T: No results for input(s): TROPONINI in the last 72 hours. INR:   Recent Labs     12/21/20  0430   INR 1.18     Lactic Acid:   Recent Labs     12/21/20  0430   LACTA 4.4*       Objective:   Vitals: /61   Pulse 98   Temp 97.3 °F (36.3 °C)   Resp 18   Ht 5' 9\" (1.753 m)   Wt 230 lb 14.4 oz (104.7 kg)   SpO2 95%   BMI 34.10 kg/m²   24HR INTAKE/OUTPUT:      Intake/Output Summary (Last 24 hours) at 12/22/2020 0944  Last data filed at 12/22/2020 0425  Gross per 24 hour   Intake    Output 550 ml   Net -550 ml     General appearance: Alert  Head: NC/AT  Eyes: conjunctivae/corneas clear  Ears: normal external ears  Neck: supple  Lungs: BLAE   Heart: RRR   Abdomen: BS+  Extremities: no edema  Skin: warm  Neurologic: Alert, gross motor function intact  Psychiatric:  mood appropriate    Assessment and Plan:   Principal Problem:    COVID-19  Active Problems:    LIU (acute kidney injury) ([address]    GERD (gastroesophageal reflux disease)    Essential hypertension    Pneumonia    CKD (chronic kidney disease), stage III  Resolved Problems:    * No resolved hospital problems.  *

## 2020-12-22 NOTE — PROGRESS NOTES
Pharmacy Consult      Dwain Magana is a 80 y.o. male for whom pharmacy has been consulted to dose cefepime. Patient Active Problem List   Diagnosis    Leg pain, bilateral    Intractable vomiting with nausea    AGE (acute gastroenteritis)    [facility] (acute kidney [facility]) [address]    Hyperkalemia    GERD (gastroesophageal reflux disease)    Essential hypertension    Hypovolemic shock ([facility]    Palliative care patient    Phimosis of penis    Severe sepsis (HCC)    Pneumonia    CVA (cerebral vascular accident) ([facility]    NSTEMI (non-ST elevated myocardial infarction) ([facility]    Elevated serum creatinine    COVID-19    CKD (chronic kidney disease), stage III       Allergies:  Ketek [telithromycin]     Recent Labs     12/20/20  1607   CREATININE 2.9*       Ht/Wt:   Ht Readings from Last 1 Encounters:   12/20/20 5' 9\" (1.753 m)        Wt Readings from Last 1 Encounters:   12/21/20 230 lb 14.4 oz (104.7 kg)         Estimated Creatinine Clearance: 18 mL/min (A) (based on SCr of 2.9 mg/dL (H)). Assessment/Plan:    Will begin cefepime 1 g IV every 12 hours. Thank you for the consult. Will continue to follow.     Electronically signed by Xiomara Veras [facility] on 12/22/2020 at 10:51 AM

## 2020-12-23 ENCOUNTER — APPOINTMENT (OUTPATIENT)
Dept: NUCLEAR MEDICINE | Age: 85
DRG: 177 | End: 2020-12-23
Payer: MEDICARE

## 2020-12-23 LAB
ALBUMIN SERPL-MCNC: 3.1 G/DL (ref 3.5–5.2)
ALP BLD-CCNC: 57 U/L (ref 40–130)
ALT SERPL-CCNC: 21 U/L (ref 5–41)
ANION GAP SERPL CALCULATED.3IONS-SCNC: 9 MMOL/L (ref 7–19)
APTT: 38.2 SEC (ref 26–36.2)
AST SERPL-CCNC: 24 U/L (ref 5–40)
BASOPHILS ABSOLUTE: 0 K/UL (ref 0–0.2)
BASOPHILS RELATIVE PERCENT: 0 % (ref 0–1)
BILIRUB SERPL-MCNC: 0.5 MG/DL (ref 0.2–1.2)
BUN BLDV-MCNC: 47 MG/DL (ref 8–23)
CALCIUM SERPL-MCNC: 8.6 MG/DL (ref 8.2–9.6)
CHLORIDE BLD-SCNC: 104 MMOL/L (ref 98–111)
CO2: 22 MMOL/L (ref 22–29)
CREAT SERPL-MCNC: 1.9 MG/DL (ref 0.5–1.2)
D DIMER: 2.23 UG/ML FEU (ref 0–0.48)
EOSINOPHILS ABSOLUTE: 0 K/UL (ref 0–0.6)
EOSINOPHILS RELATIVE PERCENT: 0 % (ref 0–5)
FIBRINOGEN: 372 MG/DL (ref 238–505)
GFR AFRICAN AMERICAN: 40
GFR NON-AFRICAN AMERICAN: 33
GLUCOSE BLD-MCNC: 110 MG/DL (ref 74–109)
HCT VFR BLD CALC: 35.4 % (ref 42–52)
HEMOGLOBIN: 11.2 G/DL (ref 14–18)
IMMATURE GRANULOCYTES #: 0.1 K/UL
INR BLD: 1.09 (ref 0.88–1.18)
LYMPHOCYTES ABSOLUTE: 0.6 K/UL (ref 1.1–4.5)
LYMPHOCYTES RELATIVE PERCENT: 10 % (ref 20–40)
MAGNESIUM: 2.2 MG/DL (ref 1.7–2.3)
MCH RBC QN AUTO: 31.2 PG (ref 27–31)
MCHC RBC AUTO-ENTMCNC: 31.6 G/DL (ref 33–37)
MCV RBC AUTO: 98.6 FL (ref 80–94)
MONOCYTES ABSOLUTE: 0.4 K/UL (ref 0–0.9)
MONOCYTES RELATIVE PERCENT: 5.9 % (ref 0–10)
NEUTROPHILS ABSOLUTE: 4.9 K/UL (ref 1.5–7.5)
NEUTROPHILS RELATIVE PERCENT: 82.9 % (ref 50–65)
PDW BLD-RTO: 15 % (ref 11.5–14.5)
PLATELET # BLD: 152 K/UL (ref 130–400)
PMV BLD AUTO: 9.4 FL (ref 9.4–12.4)
POTASSIUM SERPL-SCNC: 5.2 MMOL/L (ref 3.5–5)
PROTHROMBIN TIME: 14 SEC (ref 12–14.6)
RBC # BLD: 3.59 M/UL (ref 4.7–6.1)
SODIUM BLD-SCNC: 135 MMOL/L (ref 136–145)
TOTAL PROTEIN: 5.5 G/DL (ref 6.6–8.7)
VANCOMYCIN RANDOM: 7.7 UG/ML
WBC # BLD: 5.9 K/UL (ref 4.8–10.8)

## 2020-12-23 PROCEDURE — 3430000000 HC RX DIAGNOSTIC RADIOPHARMACEUTICAL: Performed by: INTERNAL MEDICINE

## 2020-12-23 PROCEDURE — 85379 FIBRIN DEGRADATION QUANT: CPT

## 2020-12-23 PROCEDURE — 78580 LUNG PERFUSION IMAGING: CPT

## 2020-12-23 PROCEDURE — 6370000000 HC RX 637 (ALT 250 FOR IP): Performed by: INTERNAL MEDICINE

## 2020-12-23 PROCEDURE — 6360000002 HC RX W HCPCS: Performed by: INTERNAL MEDICINE

## 2020-12-23 PROCEDURE — 2700000000 HC OXYGEN THERAPY PER DAY

## 2020-12-23 PROCEDURE — 85025 COMPLETE CBC W/AUTO DIFF WBC: CPT

## 2020-12-23 PROCEDURE — 80202 ASSAY OF VANCOMYCIN: CPT

## 2020-12-23 PROCEDURE — 80053 COMPREHEN METABOLIC PANEL: CPT

## 2020-12-23 PROCEDURE — 83735 ASSAY OF MAGNESIUM: CPT

## 2020-12-23 PROCEDURE — 85610 PROTHROMBIN TIME: CPT

## 2020-12-23 PROCEDURE — 85384 FIBRINOGEN ACTIVITY: CPT

## 2020-12-23 PROCEDURE — 1210000000 HC MED SURG R&B

## 2020-12-23 PROCEDURE — A9540 TC99M MAA: HCPCS | Performed by: INTERNAL MEDICINE

## 2020-12-23 PROCEDURE — 2580000003 HC RX 258: Performed by: INTERNAL MEDICINE

## 2020-12-23 PROCEDURE — 85730 THROMBOPLASTIN TIME PARTIAL: CPT

## 2020-12-23 RX ORDER — SENNA PLUS 8.6 MG/1
1 TABLET ORAL NIGHTLY
Status: DISCONTINUED | OUTPATIENT
Start: 2020-12-23 | End: 2020-12-27 | Stop reason: HOSPADM

## 2020-12-23 RX ORDER — LACTULOSE 10 G/15ML
20 SOLUTION ORAL 3 TIMES DAILY PRN
Status: DISCONTINUED | OUTPATIENT
Start: 2020-12-23 | End: 2020-12-27 | Stop reason: HOSPADM

## 2020-12-23 RX ORDER — SODIUM POLYSTYRENE SULFONATE 15 G/60ML
15 SUSPENSION ORAL; RECTAL ONCE
Status: COMPLETED | OUTPATIENT
Start: 2020-12-23 | End: 2020-12-23

## 2020-12-23 RX ORDER — BISACODYL 10 MG
10 SUPPOSITORY, RECTAL RECTAL DAILY PRN
Status: DISCONTINUED | OUTPATIENT
Start: 2020-12-23 | End: 2020-12-27 | Stop reason: HOSPADM

## 2020-12-23 RX ORDER — DOCUSATE SODIUM 100 MG/1
100 CAPSULE, LIQUID FILLED ORAL DAILY
Status: DISCONTINUED | OUTPATIENT
Start: 2020-12-23 | End: 2020-12-27 | Stop reason: HOSPADM

## 2020-12-23 RX ADMIN — SODIUM CHLORIDE: 9 INJECTION, SOLUTION INTRAVENOUS at 05:53

## 2020-12-23 RX ADMIN — FINASTERIDE 5 MG: 5 TABLET, FILM COATED ORAL at 09:41

## 2020-12-23 RX ADMIN — STANDARDIZED SENNA CONCENTRATE 8.6 MG: 8.6 TABLET ORAL at 20:32

## 2020-12-23 RX ADMIN — Medication 6000 UNITS: at 09:43

## 2020-12-23 RX ADMIN — ASPIRIN 81 MG: 81 TABLET, CHEWABLE ORAL at 09:39

## 2020-12-23 RX ADMIN — APIXABAN 10 MG: 5 TABLET, FILM COATED ORAL at 20:32

## 2020-12-23 RX ADMIN — HEPARIN SODIUM 5000 UNITS: 5000 INJECTION INTRAVENOUS; SUBCUTANEOUS at 14:29

## 2020-12-23 RX ADMIN — TAMSULOSIN HYDROCHLORIDE 0.4 MG: 0.4 CAPSULE ORAL at 09:43

## 2020-12-23 RX ADMIN — DEXAMETHASONE SODIUM PHOSPHATE 6 MG: 4 INJECTION, SOLUTION INTRAMUSCULAR; INTRAVENOUS at 09:41

## 2020-12-23 RX ADMIN — PANTOPRAZOLE SODIUM 20 MG: 20 TABLET, DELAYED RELEASE ORAL at 09:42

## 2020-12-23 RX ADMIN — HEPARIN SODIUM 5000 UNITS: 5000 INJECTION INTRAVENOUS; SUBCUTANEOUS at 05:53

## 2020-12-23 RX ADMIN — GUAIFENESIN AND DEXTROMETHORPHAN 5 ML: 100; 10 SYRUP ORAL at 20:51

## 2020-12-23 RX ADMIN — PRAVASTATIN SODIUM 20 MG: 20 TABLET ORAL at 20:31

## 2020-12-23 RX ADMIN — SODIUM CHLORIDE, PRESERVATIVE FREE 1 G: 5 INJECTION INTRAVENOUS at 11:54

## 2020-12-23 RX ADMIN — OXYCODONE HYDROCHLORIDE AND ACETAMINOPHEN 500 MG: 500 TABLET ORAL at 09:39

## 2020-12-23 RX ADMIN — DOCUSATE SODIUM 100 MG: 100 CAPSULE, LIQUID FILLED ORAL at 11:55

## 2020-12-23 RX ADMIN — Medication 15 G: at 12:02

## 2020-12-23 RX ADMIN — BUDESONIDE AND FORMOTEROL FUMARATE DIHYDRATE 2 PUFF: 160; 4.5 AEROSOL RESPIRATORY (INHALATION) at 09:00

## 2020-12-23 RX ADMIN — ACETAMINOPHEN 650 MG: 325 TABLET ORAL at 20:30

## 2020-12-23 RX ADMIN — ISOSORBIDE MONONITRATE 30 MG: 30 TABLET, EXTENDED RELEASE ORAL at 09:41

## 2020-12-23 RX ADMIN — Medication 10 MG: at 20:51

## 2020-12-23 RX ADMIN — BUDESONIDE AND FORMOTEROL FUMARATE DIHYDRATE 2 PUFF: 160; 4.5 AEROSOL RESPIRATORY (INHALATION) at 20:33

## 2020-12-23 RX ADMIN — Medication 5 MILLICURIE: at 15:15

## 2020-12-23 RX ADMIN — CLOPIDOGREL 75 MG: 75 TABLET, FILM COATED ORAL at 09:40

## 2020-12-23 RX ADMIN — OXYCODONE HYDROCHLORIDE AND ACETAMINOPHEN 500 MG: 500 TABLET ORAL at 20:31

## 2020-12-23 RX ADMIN — CYANOCOBALAMIN TAB 500 MCG 1000 MCG: 500 TAB at 09:43

## 2020-12-23 RX ADMIN — SODIUM CHLORIDE, PRESERVATIVE FREE 1 G: 5 INJECTION INTRAVENOUS at 22:30

## 2020-12-23 RX ADMIN — TAMSULOSIN HYDROCHLORIDE 0.4 MG: 0.4 CAPSULE ORAL at 20:32

## 2020-12-23 RX ADMIN — VANCOMYCIN HYDROCHLORIDE 1500 MG: 10 INJECTION, POWDER, LYOPHILIZED, FOR SOLUTION INTRAVENOUS at 15:30

## 2020-12-23 RX ADMIN — SERTRALINE HYDROCHLORIDE 50 MG: 50 TABLET ORAL at 09:42

## 2020-12-23 ASSESSMENT — PAIN SCALES - GENERAL
PAINLEVEL_OUTOF10: 0
PAINLEVEL_OUTOF10: 1
PAINLEVEL_OUTOF10: 0

## 2020-12-23 NOTE — CONSULTS
INFECTIOUS DISEASES CONSULT NOTE    Patient:  Musa Voss 80 y.o. male  ROOM # [unfilled]  YOB: 1925  MRN: [id number]  CSN:  [id number]  Admit date: 12/20/2020   Admitting Physician: Katey Mariee MD  Primary Care Physician: No primary care provider on file. REFERRING PROVIDER: No ref. provider found    Reason for Consultation: Staph capitis bacteremia. History of Present Illness/Chief Complaint: Pleasant 80-year-old man. History is obtained primarily from chart review. He will answer some questions. Hard to get definite time course from the patient. Per review of his admit H&P he resides today skilled nursing facility. He was brought because of increased dyspnea and hypoxia. He was diagnosed with COVID-19. There was concern for the possibility of secondary bacterial pneumonia as well. He has been on 4 L of oxygen via nasal cannula. He appears to been stable. He had a blood culture that grew Staphylococcus capitis. Infectious disease asked to evaluate and offer recommendations.     Current Scheduled Medications:    docusate sodium  100 mg Oral Daily    senna  1 tablet Oral Nightly    vancomycin  1,500 mg Intravenous Once    cefepime  1 g Intravenous Q12H    vancomycin (VANCOCIN) intermittent dosing (placeholder)   Other RX Placeholder    dexamethasone  6 mg Intravenous Daily    budesonide-formoterol  2 puff Inhalation BID    vitamin C  500 mg Oral BID    clopidogrel  75 mg Oral Daily    aspirin  81 mg Oral Daily    finasteride  5 mg Oral Daily    pantoprazole  20 mg Oral Daily    pravastatin  20 mg Oral Nightly    sertraline  50 mg Oral Daily    tamsulosin  0.4 mg Oral BID    vitamin B-12  1,000 mcg Oral Daily    isosorbide mononitrate  30 mg Oral Daily    heparin (porcine)  5,000 Units Subcutaneous 3 times per day    Vitamin D  6,000 Units Oral Daily     Current PRN Medications: bisacodyl, lactulose, albuterol sulfate HFA, calcium carbonate, fluticasone, melatonin, acetaminophen **OR** acetaminophen, guaiFENesin-dextromethorphan, potassium chloride **OR** potassium alternative oral replacement **OR** potassium chloride, magnesium sulfate    Allergies: Allergies   Allergen Reactions    Ketek [Telithromycin]      Past Medical History: Prostate cancer. Depression. Gastroesophageal reflux disease. Hemiplegia/hemiparesis. Hypertension. Hyperlipidemia. Gastroesophageal reflux disease. Improving acute renal injury. Past Surgical History: Ankle fusion. Appendectomy. Cholecystectomy. Finger amputation. Knee arthroplasty. Social History:  No tobacco, alcohol, or illicit drug use. Per chart review he had been living in a nursing home prior to admission. Family History: Heart disease. Diabetes. Stroke. Rheumatic fever. Review of Systems:   No chest pain or chest pressure  No nausea or vomiting  Minimal cough  No nausea vomiting  No abdominal pain or diarrhea    Vital Signs:  /64   Pulse 66   Temp 97 °F (36.1 °C)   Resp 16   Ht 5' 9\" (1.753 m)   Wt 235 lb 4.8 oz (106.7 kg)   SpO2 96%   BMI 34.75 kg/m²  Temp (24hrs), Av.2 °F (36.2 °C), Min:97 °F (36.1 °C), Max:97.4 °F (36.3 °C)    Physical Exam:   Vital signs reviewed. General alert, pleasant, comfortable appearing, no respiratory distress. I observed him and talked with him from his doorway.   Discussed bedside exam with nursing:  No crackles or wheezes  IV site without erythema  Abdomen soft and nondistended  No significant lower extremity edema    Lab Results:  CBC:   Recent Labs     20  0430 20  1100 20  0555   WBC 16.4* 10.4 5.9   HGB 12.3* 11.7* 11.2*   HCT 37.9* 35.9* 35.4*    155 152   LYMPHOPCT 3.5* 5.5* 10.0*   MONOPCT 2.2 3.9 5.9     CMP:   Recent Labs     20  1607 20  1100 20  0555   * 134* 135*   K 4.8 4.9 5.2*   CL 96* 104 104   CO2 24 * 22 BUN 42* 50* 47*   CREATININE 2.9* 2.3* 1.9*   CALCIUM 9.0 8.9 8.6   BILITOT 0.7 0.4 0.5   ALKPHOS 74 61 57   ALT 26 21 21   AST 33 28 24   GLUCOSE 96 115* 110*     Laboratory work on December 20, 2020:  Procalcitonin 17.9  Hemoglobin A1c 5.4  C-reactive protein 22.3  Ferritin 589    Culture: Blood culture 1 set December 21, 2020-Staphylococcus capitis    Radiology:   VQ scan done today:  Impression   A limited diagnostic study in the absence of a ventilation   scan. An intermediate probability of pulmonary embolism of the right lower   lobe. Further correlation with CT angiography of the chest may be   obtained. Signed by Dr Ese Dawson on 12/23/2020 3:45 PM     CXR 12/20/2020: Impression   1. Patchy left lower lobe interstitial infiltrate compatible with   pneumonia.    Signed by Dr Thom Avila on 12/20/2020 6:25 PM         Impression:   COVID-19 infection  Positive blood culture for Staphylococcus capitis-suspect contaminant  VQ scan with intermediate probability for pulmonary embolism  Coronary artery disease  Hypertension    Recommendations:    Feel secondary bacterial infection less likely at present  Suspect his positive blood culture represents contaminant  Repeat blood cultures ordered  Okay with me to discontinue antibiotic treatment  For management of his COVID-19 would continue course of dexamethasone treatment  Agree with anticoagulation given intermediate probability VQ scan  Would follow temperature curve and clinical course off antibiotic treatment    Judy Rinaldi MD  12/23/20  3:42 PM

## 2020-12-23 NOTE — PROGRESS NOTES
[facility]        Hospitalist Progress Note  12/23/2020 11:16 AM  Subjective:   Admit Date: 12/20/2020  PCP: No primary care provider on file. Chief Complaint: Dyspnea    Subjective: Patient seen at bedside. Symptomatically improving. Constipated. Cumulative Hospital History: The patient is a 80 y.o. male with a past medical history of CAD, CKD 3, hypertension who presented to the ED complaining of dyspnea from skilled nursing facility found to have acute respiratory failure with hypoxia requiring oxygen therapy. Patient noted to have COVID-19 and possible concomitant bacterial pneumonia on chest x-ray. Patient is DNR/DNI. Blood culture positive for staph capitis. ID consulted. Elevated d-dimer - V/Q scan pending. ROS: 14 point review of systems is negative except as specifically addressed above. DIET RENAL;     Intake/Output Summary (Last 24 hours) at 12/23/2020 1116  Last data filed at 12/22/2020 1903  Gross per 24 hour   Intake 951 ml   Output 1000 ml   Net -49 ml     Medications:   sodium chloride 100 mL/hr at 12/23/20 2697     Current Facility-Administered Medications   Medication Dose Route Frequency Provider Last Rate Last Admin    sodium polystyrene (KAYEXALATE) 15 GM/60ML suspension 15 g  15 g Oral Once Sy Lopez MD        docusate sodium (COLACE) capsule 100 mg  100 mg Oral Daily Sy Lopez MD        [facility]) tablet 8.6 mg  1 tablet Oral Nightly Sy Lopez MD        bisacodyl (DULCOLAX) suppository 10 mg  10 mg Rectal Daily PRN Sy Lopez MD        lactulose (CHRONULAC) 10 GM/15ML solution 20 g  20 g Oral TID PRN Sy Lopez MD        cefepime (MAXIPIME) 1 g in sodium chloride (PF) 10 mL IV syringe  1 g Intravenous Q12H Sy Lopez MD   1 g at 12/22/20 2330    vancomycin (VANCOCIN) intermittent dosing (placeholder)   Other RX Placeholder Sy Lopez MD  dexamethasone (DECADRON) injection 6 mg  6 mg Intravenous Daily Yan Coyle MD   6 mg at 12/23/20 0941    0.9 % sodium chloride infusion   Intravenous Continuous Yan Coyle  mL/hr at 12/23/20 0553 New Bag at 12/23/20 0553    albuterol sulfate  (90 Base) MCG/ACT inhaler 2 puff  2 puff Inhalation Q6H PRN Yan Coyle MD        budesonide-formoterol [facility]) 160-4.5 MCG/ACT inhaler 2 puff  2 puff Inhalation BID Yan Coyle MD   2 puff at 12/23/20 0900    ascorbic acid (VITAMIN C) tablet 500 mg  500 mg Oral BID Yan Coyle MD   500 mg at 12/23/20 2001    calcium carbonate (TUMS) chewable tablet 500 mg  500 mg Oral Daily PRN Yan Coyle MD        clopidogrel (PLAVIX) tablet 75 mg  75 mg Oral Daily Yan Coyle MD   75 mg at 12/23/20 0940    aspirin chewable tablet 81 mg  81 mg Oral Daily Yan Coyle MD   81 mg at 12/23/20 1636    finasteride (PROSCAR) tablet 5 mg  5 mg Oral Daily Yan Coyle MD   5 mg at 12/23/20 0941    fluticasone (FLONASE) 50 MCG/ACT nasal spray 2 spray  2 spray Each Nostril Nightly PRN Yan Coyle MD   2 spray at 12/22/20 0824    melatonin disintegrating tablet 10 mg  10 mg Oral Nightly PRN Yan Coyle MD   10 mg at 12/22/20 2116    pantoprazole (PROTONIX) tablet 20 mg  20 mg Oral Daily Yan Coyle MD   20 mg at 12/23/20 0156    pravastatin (PRAVACHOL) tablet 20 mg  20 mg Oral Nightly Yan Coyle MD   20 mg at 12/22/20 2116    sertraline (ZOLOFT) tablet 50 mg  50 mg Oral Daily Yan Coyle MD   50 mg at 12/23/20 7763    tamsulosin (FLOMAX) capsule 0.4 mg  0.4 mg Oral BID Yan Coyle MD   0.4 mg at 12/23/20 9247    vitamin B-12 (CYANOCOBALAMIN) tablet 1,000 mcg  1,000 mcg Oral Daily Yan Coyle MD   1,000 mcg at 12/23/20 7723    isosorbide mononitrate (IMDUR) extended release tablet 30 mg  30 mg Oral Daily Yan Coyle MD   30 mg at 12/23/20 0941    acetaminophen (TYLENOL) tablet 650 mg  650 mg Oral Q6H PRN Yan Coyle MD        Or  acetaminophen (TYLENOL) suppository 650 mg  650 mg Rectal Q6H PRN Chris Roberts MD        heparin (porcine) injection 5,000 Units  5,000 Units Subcutaneous 3 times per day Chris Roberts MD   5,000 Units at 12/23/20 0553    guaiFENesin-dextromethorphan (ROBITUSSIN DM) 100-10 MG/5ML syrup 5 mL  5 mL Oral Q4H PRN Chris Roberts MD        Vitamin D (CHOLECALCIFEROL) tablet 6,000 Units  6,000 Units Oral Daily Chris Roberts MD   6,000 Units at 12/23/20 0943    potassium chloride (KLOR-CON M) extended release tablet 40 mEq  40 mEq Oral PRN Chris Roberts MD        Or    potassium bicarb-citric acid (EFFER-K) effervescent tablet 40 mEq  40 mEq Oral PRN Chris Roberts MD        Or    potassium chloride 10 mEq/100 mL IVPB (Peripheral Line)  10 mEq Intravenous PRN Chris Roberts MD        magnesium sulfate 1 g in dextrose 5% 100 mL IVPB  1 g Intravenous PRN Chris Roberts MD            Labs:     Recent Labs     12/21/20  0430 12/22/20  1100 12/23/20  0555   WBC 16.4* 10.4 5.9   RBC 3.87* 3.71* 3.59*   HGB 12.3* 11.7* 11.2*   HCT 37.9* 35.9* 35.4*   MCV 97.9* 96.8* 98.6*   MCH 31.8* 31.5* 31.2*   MCHC 32.5* 32.6* 31.6*    155 152     Recent Labs     12/20/20  1607 12/22/20  1100 12/23/20  0555   * 134* 135*   K 4.8 4.9 5.2*   ANIONGAP 14 9 9   CL 96* 104 104   CO2 24 21* 22   BUN 42* 50* 47*   CREATININE 2.9* 2.3* 1.9*   GLUCOSE 96 115* 110*   CALCIUM 9.0 8.9 8.6     Recent Labs     12/22/20  1100 12/23/20  0555   MG 2.1 2.2     Recent Labs     12/20/20  1607 12/22/20  1100 12/23/20  0555   AST 33 28 24   ALT 26 21 21   BILITOT 0.7 0.4 0.5   ALKPHOS 74 61 57     ABGs:No results for input(s): PH, PO2, PCO2, HCO3, BE, O2SAT in the last 72 hours. Troponin T: No results for input(s): TROPONINI in the last 72 hours.   INR:   Recent Labs     12/21/20  0430 12/22/20  1100 12/23/20  0555   INR 1.18 1.07 1.09     Lactic Acid:   Recent Labs     12/22/20  1100   LACTA 2.0*       Objective: Vitals: BP (!) 143/69   Pulse 70   Temp 97.4 °F (36.3 °C) (Temporal)   Resp 14   Ht 5' 9\" (1.753 m)   Wt 235 lb 4.8 oz (106.7 kg)   SpO2 97%   BMI 34.75 kg/m²   24HR INTAKE/OUTPUT:      Intake/Output Summary (Last 24 hours) at 12/23/2020 1116  Last data filed at 12/22/2020 1903  Gross per 24 hour   Intake 951 ml   Output 1000 ml   Net -49 ml     General appearance: Alert  Head: NC/AT  Eyes: conjunctivae/corneas clear  Ears: normal external ears  Neck: supple  Lungs: BLAE   Heart: RRR   Abdomen: BS+  Extremities: no edema  Skin: warm  Neurologic: Alert, gross motor function intact  Psychiatric:  mood appropriate    Assessment and Plan:   Principal Problem:    COVID-19  Active Problems:    LIU (acute kidney injury) (HCC)    GERD (gastroesophageal reflux disease)    Essential hypertension    Pneumonia    CKD (chronic kidney disease), stage III  Resolved Problems:    * No resolved hospital problems. *    COVID-19: Stable/mild improvement. Requiring 4L NC. Decadron. Hold remdesivir for renal function. IV fluids. Supportive management. O2 as needed. Elevated d-dimer - V/Q scan pending.     PNA/? Bacteremia: Staph capitis - ID consulted. Vanco/Cefepime. Follow-up repeat cultures.     LIU on CKD 3: IV fluids. Monitor BMP - improving.     Lactic acidosis: IVF. Improving - trend.     CAD: Recent admission for chest pain. Placed on dual antiplatelet therapy. Continue statin. Poor candidate for invasive diagnostic/therapeutic intervention based on previous cardiology note. Denies chest pain.     Hypertension: Monitor BP and adjust medications as needed.      Supportive management.     Advance Directive: DNR-CC    DVT prophylaxis: Heparin    Discharge planning: TBD      Signed:  Heaven Griggs MD 12/23/2020 11:16 AM  Rounding Hospitalist

## 2020-12-23 NOTE — PROGRESS NOTES
Pharmacy Vancomycin Consult     Vancomycin Day: 2  Current Dosing: Pulse dosing    Temp max:  97.4    Recent Labs     12/22/20  1100 12/23/20  0555   BUN 50* 47*       Recent Labs     12/22/20  1100 12/23/20  0555   CREATININE 2.3* 1.9*       Recent Labs     12/22/20  1100 12/23/20  0555   WBC 10.4 5.9         Intake/Output Summary (Last 24 hours) at 12/23/2020 1439  Last data filed at 12/22/2020 1903  Gross per 24 hour   Intake 951 ml   Output 1000 ml   Net -49 ml       Culture Date Source Results   12/21/20 Blood Staph capitis                 Ht Readings from Last 1 Encounters:   12/20/20 5' 9\" (1.753 m)        Wt Readings from Last 1 Encounters:   12/22/20 235 lb 4.8 oz (106.7 kg)         Body mass index is 34.75 kg/m². Estimated Creatinine Clearance: 28 mL/min (A) (based on SCr of 1.9 mg/dL (H)).     Random: 7.7 (24 hours)     Assessment/Plan: Give vancomycin 1500 mg x 1 dose today; pharmacy will continue to follow and make adjustments    Electronically signed by JAYDON Mathias [facility] [place] on 12/23/2020 at 2:39 PM

## 2020-12-24 LAB
ALBUMIN SERPL-MCNC: 3.1 G/DL (ref 3.5–5.2)
ALP BLD-CCNC: 58 U/L (ref 40–130)
ALT SERPL-CCNC: 24 U/L (ref 5–41)
ANION GAP SERPL CALCULATED.3IONS-SCNC: 11 MMOL/L (ref 7–19)
APTT: 35.9 SEC (ref 26–36.2)
AST SERPL-CCNC: 28 U/L (ref 5–40)
BASOPHILS ABSOLUTE: 0 K/UL (ref 0–0.2)
BASOPHILS RELATIVE PERCENT: 0.2 % (ref 0–1)
BILIRUB SERPL-MCNC: 0.6 MG/DL (ref 0.2–1.2)
BLOOD CULTURE, ROUTINE: ABNORMAL
BLOOD CULTURE, ROUTINE: ABNORMAL
BUN BLDV-MCNC: 46 MG/DL (ref 8–23)
CALCIUM SERPL-MCNC: 8.3 MG/DL (ref 8.2–9.6)
CHLORIDE BLD-SCNC: 103 MMOL/L (ref 98–111)
CO2: 19 MMOL/L (ref 22–29)
CREAT SERPL-MCNC: 1.6 MG/DL (ref 0.5–1.2)
D DIMER: 2 UG/ML FEU (ref 0–0.48)
EOSINOPHILS ABSOLUTE: 0 K/UL (ref 0–0.6)
EOSINOPHILS RELATIVE PERCENT: 0 % (ref 0–5)
FIBRINOGEN: 389 MG/DL (ref 238–505)
GFR AFRICAN AMERICAN: 49
GFR NON-AFRICAN AMERICAN: 40
GLUCOSE BLD-MCNC: 117 MG/DL (ref 74–109)
HCT VFR BLD CALC: 35.6 % (ref 42–52)
HEMOGLOBIN: 11.7 G/DL (ref 14–18)
IMMATURE GRANULOCYTES #: 0.1 K/UL
INR BLD: 1.22 (ref 0.88–1.18)
LACTIC ACID: 1.4 MMOL/L (ref 0.5–1.9)
LYMPHOCYTES ABSOLUTE: 0.7 K/UL (ref 1.1–4.5)
LYMPHOCYTES RELATIVE PERCENT: 14 % (ref 20–40)
MAGNESIUM: 2.3 MG/DL (ref 1.7–2.3)
MCH RBC QN AUTO: 31.7 PG (ref 27–31)
MCHC RBC AUTO-ENTMCNC: 32.9 G/DL (ref 33–37)
MCV RBC AUTO: 96.5 FL (ref 80–94)
MONOCYTES ABSOLUTE: 0.4 K/UL (ref 0–0.9)
MONOCYTES RELATIVE PERCENT: 7.5 % (ref 0–10)
NEUTROPHILS ABSOLUTE: 4 K/UL (ref 1.5–7.5)
NEUTROPHILS RELATIVE PERCENT: 76.4 % (ref 50–65)
ORGANISM: ABNORMAL
PDW BLD-RTO: 14.5 % (ref 11.5–14.5)
PLATELET # BLD: 141 K/UL (ref 130–400)
PMV BLD AUTO: 9.4 FL (ref 9.4–12.4)
POTASSIUM SERPL-SCNC: 5.2 MMOL/L (ref 3.5–5)
PROTHROMBIN TIME: 15.4 SEC (ref 12–14.6)
RBC # BLD: 3.69 M/UL (ref 4.7–6.1)
SODIUM BLD-SCNC: 133 MMOL/L (ref 136–145)
TOTAL PROTEIN: 5.5 G/DL (ref 6.6–8.7)
WBC # BLD: 5.2 K/UL (ref 4.8–10.8)

## 2020-12-24 PROCEDURE — 1210000000 HC MED SURG R&B

## 2020-12-24 PROCEDURE — 80053 COMPREHEN METABOLIC PANEL: CPT

## 2020-12-24 PROCEDURE — 83735 ASSAY OF MAGNESIUM: CPT

## 2020-12-24 PROCEDURE — 83605 ASSAY OF LACTIC ACID: CPT

## 2020-12-24 PROCEDURE — 2580000003 HC RX 258: Performed by: INTERNAL MEDICINE

## 2020-12-24 PROCEDURE — 87040 BLOOD CULTURE FOR BACTERIA: CPT

## 2020-12-24 PROCEDURE — 2700000000 HC OXYGEN THERAPY PER DAY

## 2020-12-24 PROCEDURE — 85610 PROTHROMBIN TIME: CPT

## 2020-12-24 PROCEDURE — 85025 COMPLETE CBC W/AUTO DIFF WBC: CPT

## 2020-12-24 PROCEDURE — 6360000002 HC RX W HCPCS: Performed by: INTERNAL MEDICINE

## 2020-12-24 PROCEDURE — 85379 FIBRIN DEGRADATION QUANT: CPT

## 2020-12-24 PROCEDURE — 6370000000 HC RX 637 (ALT 250 FOR IP): Performed by: INTERNAL MEDICINE

## 2020-12-24 PROCEDURE — 85730 THROMBOPLASTIN TIME PARTIAL: CPT

## 2020-12-24 PROCEDURE — 85384 FIBRINOGEN ACTIVITY: CPT

## 2020-12-24 RX ADMIN — ACETAMINOPHEN 650 MG: 325 TABLET ORAL at 20:14

## 2020-12-24 RX ADMIN — TAMSULOSIN HYDROCHLORIDE 0.4 MG: 0.4 CAPSULE ORAL at 20:07

## 2020-12-24 RX ADMIN — FINASTERIDE 5 MG: 5 TABLET, FILM COATED ORAL at 09:36

## 2020-12-24 RX ADMIN — DOCUSATE SODIUM 100 MG: 100 CAPSULE, LIQUID FILLED ORAL at 09:36

## 2020-12-24 RX ADMIN — SERTRALINE HYDROCHLORIDE 50 MG: 50 TABLET ORAL at 09:36

## 2020-12-24 RX ADMIN — TAMSULOSIN HYDROCHLORIDE 0.4 MG: 0.4 CAPSULE ORAL at 09:36

## 2020-12-24 RX ADMIN — BUDESONIDE AND FORMOTEROL FUMARATE DIHYDRATE 2 PUFF: 160; 4.5 AEROSOL RESPIRATORY (INHALATION) at 09:35

## 2020-12-24 RX ADMIN — STANDARDIZED SENNA CONCENTRATE 8.6 MG: 8.6 TABLET ORAL at 20:07

## 2020-12-24 RX ADMIN — SODIUM CHLORIDE: 9 INJECTION, SOLUTION INTRAVENOUS at 02:52

## 2020-12-24 RX ADMIN — DEXAMETHASONE SODIUM PHOSPHATE 6 MG: 4 INJECTION, SOLUTION INTRAMUSCULAR; INTRAVENOUS at 09:36

## 2020-12-24 RX ADMIN — APIXABAN 10 MG: 5 TABLET, FILM COATED ORAL at 09:35

## 2020-12-24 RX ADMIN — APIXABAN 10 MG: 5 TABLET, FILM COATED ORAL at 20:08

## 2020-12-24 RX ADMIN — PANTOPRAZOLE SODIUM 20 MG: 20 TABLET, DELAYED RELEASE ORAL at 09:36

## 2020-12-24 RX ADMIN — ASPIRIN 81 MG: 81 TABLET, CHEWABLE ORAL at 09:36

## 2020-12-24 RX ADMIN — Medication 6000 UNITS: at 09:36

## 2020-12-24 RX ADMIN — BUDESONIDE AND FORMOTEROL FUMARATE DIHYDRATE 2 PUFF: 160; 4.5 AEROSOL RESPIRATORY (INHALATION) at 20:08

## 2020-12-24 RX ADMIN — Medication 10 MG: at 20:13

## 2020-12-24 RX ADMIN — CYANOCOBALAMIN TAB 500 MCG 1000 MCG: 500 TAB at 09:36

## 2020-12-24 RX ADMIN — OXYCODONE HYDROCHLORIDE AND ACETAMINOPHEN 500 MG: 500 TABLET ORAL at 20:08

## 2020-12-24 RX ADMIN — ISOSORBIDE MONONITRATE 30 MG: 30 TABLET, EXTENDED RELEASE ORAL at 09:36

## 2020-12-24 RX ADMIN — PRAVASTATIN SODIUM 20 MG: 20 TABLET ORAL at 20:08

## 2020-12-24 RX ADMIN — OXYCODONE HYDROCHLORIDE AND ACETAMINOPHEN 500 MG: 500 TABLET ORAL at 09:36

## 2020-12-24 RX ADMIN — CLOPIDOGREL 75 MG: 75 TABLET, FILM COATED ORAL at 09:36

## 2020-12-24 ASSESSMENT — PAIN SCALES - GENERAL: PAINLEVEL_OUTOF10: 1

## 2020-12-24 NOTE — PROGRESS NOTES
Infectious Diseases Progress Note    Patient:  Cash Nieves  YOB: 1925  MRN: [id number]   Admit date: 12/20/2020   Admitting Physician: Avani Hu MD  Primary Care Physician: No primary care provider on file. Chief Complaint/Interval History:   He is without fever. Hemodynamically stable. Minimal cough. Currently on 3 to 4 L of oxygen via nasal cannula. Oxygen saturation 96 to 98%. No nausea or diarrhea. In/Out    Intake/Output Summary (Last 24 hours) at 12/24/2020 1459  Last data filed at 12/24/2020 0513  Gross per 24 hour   Intake 1903.34 ml   Output 950 ml   Net 953.34 ml     Allergies:    Allergies   Allergen Reactions    Ketek [Telithromycin]      Current Meds:     docusate sodium (COLACE) capsule 100 mg, Daily      senna (SENOKOT) tablet 8.6 mg, Nightly      bisacodyl (DULCOLAX) suppository 10 mg, Daily PRN      lactulose (CHRONULAC) 10 GM/15ML solution 20 g, TID PRN      apixaban (ELIQUIS) tablet 10 mg, BID    Followed by    Ghazal Bagley ON 12/30/2020] apixaban (ELIQUIS) tablet 5 mg, BID      dexamethasone (DECADRON) injection 6 mg, Daily      0.9 % sodium chloride infusion, Continuous      albuterol sulfate  (90 Base) MCG/ACT inhaler 2 puff, Q6H PRN      budesonide-formoterol (SYMBICORT) 160-4.5 MCG/ACT inhaler 2 puff, BID      ascorbic acid (VITAMIN C) tablet 500 mg, BID      calcium carbonate (TUMS) chewable tablet 500 mg, Daily PRN      clopidogrel (PLAVIX) tablet 75 mg, Daily      aspirin chewable tablet 81 mg, Daily      finasteride (PROSCAR) tablet 5 mg, Daily      fluticasone (FLONASE) 50 MCG/ACT nasal spray 2 spray, Nightly PRN      melatonin disintegrating tablet 10 mg, Nightly PRN      pantoprazole (PROTONIX) tablet 20 mg, Daily      pravastatin (PRAVACHOL) tablet 20 mg, Nightly      sertraline (ZOLOFT) tablet 50 mg, Daily      tamsulosin (FLOMAX) capsule 0.4 mg, BID      vitamin B-12 (CYANOCOBALAMIN) tablet 1,000 mcg, Daily   isosorbide mononitrate (IMDUR) extended release tablet 30 mg, Daily      acetaminophen (TYLENOL) tablet 650 mg, Q6H PRN    Or      acetaminophen (TYLENOL) suppository 650 mg, Q6H PRN      guaiFENesin-dextromethorphan (ROBITUSSIN DM) 100-10 MG/5ML syrup 5 mL, Q4H PRN      Vitamin D (CHOLECALCIFEROL) tablet 6,000 Units, Daily      potassium chloride (KLOR-CON M) extended release tablet 40 mEq, PRN    Or      potassium bicarb-citric acid (EFFER-K) effervescent tablet 40 mEq, PRN    Or      potassium chloride 10 mEq/100 mL IVPB (Peripheral Line), PRN      magnesium sulfate 1 g in dextrose 5% 100 mL IVPB, PRN      Review of Systems see HPI    VitalSigns:  BP (!) 142/55   Pulse 66   Temp 96.4 °F (35.8 °C) (Temporal)   Resp 22   Ht 5' 9\" (1.753 m)   Wt 232 lb 12.8 oz (105.6 kg)   SpO2 96%   BMI 34.38 kg/m²      Physical Exam  Lungs clear to auscultation without crackles  Abdomen soft nontender  Extremities without significant edema    Lab Results:  CBC:   Recent Labs     12/22/20  1100 12/23/20  0555 12/24/20  0330   WBC 10.4 5.9 5.2   HGB 11.7* 11.2* 11.7*    152 141     BMP:  Recent Labs     12/22/20  1100 12/23/20  0555 12/24/20  0330   * 135* 133*   K 4.9 5.2* 5.2*    104 103   CO2 21* 22 19*   BUN 50* 47* 46*   CREATININE 2.3* 1.9* 1.6*   GLUCOSE 115* 110* 117*     CultureResults: Follow-up blood cultures no growth to date    Radiology: None    Additional Studies Reviewed:  None    Impression:  1. COVID-19 infection-stable to improving  2. Positive blood culture for Staphylococcus capitis-contaminant  3.  VQ scan intermediate probability for PE  4. Coronary artery disease  5.   Hypertension    Recommendations:  Continue off antibiotic treatment  Would complete 7 to 10-day course of dexamethasone  Continue anticoagulation per general medicine  Will sign off for now Would be happy to reassess if recurrent fever, no ongoing improvement, or if follow-up blood cultures were to turn positive  Otherwise follow-up with infectious diseases as needed     dEward Clarke MD

## 2020-12-24 NOTE — PROGRESS NOTES
Spoke with pt's son to provide support and spiritual care. Pt's son Claudia Fish says pt was able to provide his cell phone number and he called him today and it was the best Jas gift he's ever had. Provided support and spiritual care with prayer.  Pt's son expressed gratitude for spiritual.    Electronically signed by Nelida David on 12/24/2020 at 12:02 PM

## 2020-12-24 NOTE — PROGRESS NOTES
Progress Note  Date:2020       Room:0412/412-02  Patient Erich Alvarado     YOB: 1925     Age:89 y.o. Subjective    Subjective: 89 y. o. male with a past medical history of CAD, CKD 3, hypertension who presented to the ED complaining of dyspnea from skilled nursing facility found to have acute respiratory failure with hypoxia requiring oxygen therapy.  Patient noted to have COVID-19, Patient is DNR/DNI. Blood culture positive for staph capitis, initial antibiotics were discontinued as likely secondary to contamination. Patient with elevated d-dimer - V/Q scan was indeterminate probability. Continue Eliquis as ordered. Seen today in his room, denied any acute overnight event, denied any chest pain or shortness of breath. Patient will be hard of hearing however answers questions adequately. Review of Systems: 12 point system reviewed and negative except as stated above. Objective         Vitals Last 24 Hours:  TEMPERATURE:  Temp  Av.9 °F (36.1 °C)  Min: 96.4 °F (35.8 °C)  Max: 97.7 °F (36.5 °C)  RESPIRATIONS RANGE: Resp  Av  Min: 18  Max: 22  PULSE OXIMETRY RANGE: SpO2  Av.8 %  Min: 93 %  Max: 97 %  PULSE RANGE: Pulse  Av.3  Min: 62  Max: 68  BLOOD PRESSURE RANGE: Systolic (16TOY), MOR:612 , Min:119 , MARY ALICE:551   ; Diastolic (38ILT), AVX:44, Min:55, Max:68    I/O (24Hr): Intake/Output Summary (Last 24 hours) at 2020 1717  Last data filed at 2020 0513  Gross per 24 hour   Intake 1903.34 ml   Output 950 ml   Net 953.34 ml       Physical examination:  Patient was monitored today in his room, appeared to be in no acute distress, answers questions appropriately, did not sound to be in any respiratory distress. Respiration seems unlabored, no evidence of dyspnea on conversation. Patient is alert with clear speech.       Labs/Imaging/Diagnostics    Labs:  CBC:  Recent Labs     20  1100 20  0555 20  0330   WBC 10.4 5.9 5.2 RBC 3.71* 3.59* 3.69*   HGB 11.7* 11.2* 11.7*   HCT 35.9* 35.4* 35.6*   MCV 96.8* 98.6* 96.5*   RDW 15.1* 15.0* 14.5    152 141     CHEMISTRIES:  Recent Labs     12/22/20  1100 12/23/20  0555 12/24/20  0330   * 135* 133*   K 4.9 5.2* 5.2*    104 103   CO2 21* 22 19*   BUN 50* 47* 46*   CREATININE 2.3* 1.9* 1.6*   GLUCOSE 115* 110* 117*   MG 2.1 2.2 2.3     PT/INR:  Recent Labs     12/22/20  1100 12/23/20  0555 12/24/20  0330   PROTIME 13.8 14.0 15.4*   INR 1.07 1.09 1.22*     APTT:  Recent Labs     12/22/20  1100 12/23/20  0555 12/24/20  0330   APTT 41.4* 38.2* 35.9     LIVER PROFILE:  Recent Labs     12/22/20  1100 12/23/20  0555 12/24/20  0330   AST 28 24 28   ALT 21 21 24   BILITOT 0.4 0.5 0.6   ALKPHOS 61 57 58       Imaging Last 24 Hours:  Nm Lung Scan Perfusion Only    Result Date: 12/23/2020  Examination. NM LUNG SCAN PERFUSION ONLY 12/23/2020 2:01 PM History: Hypoxia and elevated d-dimer. After the intravenous injection of 5.1 mCi of technetium 99m macroaggregated albumin, images of the lungs are obtained in anterior, posterior, both oblique and lateral projections with help of scintillation camera. There is no previous study for comparison. The ventilation scans are obtained. There are a few scattered nonsegmental defects in both lungs. There is a subsegmental defect involving the right lower lobe, anterior segment. A limited diagnostic study in the absence of a ventilation scan. An intermediate probability of pulmonary embolism of the right lower lobe. Further correlation with CT angiography of the chest may be obtained.  Signed by Dr Marcie Andersen on 12/23/2020 3:45 PM    Assessment//Plan           Hospital Problems           Last Modified POA    * (Principal) COVID-19 12/20/2020 Yes    LIU (acute kidney injury) ([facility] [address] 12/20/2020 Yes    GERD (gastroesophageal reflux disease) 12/20/2020 Yes    Essential hypertension 12/20/2020 Yes    Pneumonia 12/20/2020 Yes CKD (chronic kidney disease), stage III 12/20/2020 Yes        Assessment & Plan    COVID-19: Stable/mild improvement. Requiring 3-4L NC. Continue Decadron.    Hold remdesivir for renal function and some appreciable improvement.    Supportive management.    O2 as needed. Elevated d-dimer - V/Q scan indeterminant   Discontinue antibiotics and monitor fever curve.     LIU on CKD 3: IV fluids.  Monitor BMP - improving.     Lactic acidosis: IVF. Improving - trend.     CAD: Recent admission for chest pain.    Placed on dual antiplatelet therapy.    Continue statin and Imdur  Poor candidate for invasive diagnostic/therapeutic intervention based on previous cardiology note.  Denies chest pain. Bacteremia; noted to be contaminant  Discontinue antibiotics and monitor fever curve  Repeat blood culture ordered. Hypertension: Continue Imdur    BPH: Finasteride and Flomax. GERD: Continue pantoprazole       Advance Directive: DNR-CC  DVT prophylaxis: Heparin  Discharge planning: TBD      Electronically signed by   Sully Naranjo   Internal Medicine Hospitalist  On 12/24/2020  At 5:25 PM    EMR Dragon/Transcription disclaimer:   Much of this encounter note is an electronic transcription/translation of spoken language to printed text.  The electronic translation of spoken language may permit erroneous, or at times, nonsensical words or phrases to be inadvertently transcribed; although attempts have made to review the note for such errors, some may still exist.

## 2020-12-25 LAB
ALBUMIN SERPL-MCNC: 2.8 G/DL (ref 3.5–5.2)
ALP BLD-CCNC: 57 U/L (ref 40–130)
ALT SERPL-CCNC: 25 U/L (ref 5–41)
ANION GAP SERPL CALCULATED.3IONS-SCNC: 11 MMOL/L (ref 7–19)
APTT: 31.5 SEC (ref 26–36.2)
AST SERPL-CCNC: 22 U/L (ref 5–40)
BASOPHILS ABSOLUTE: 0 K/UL (ref 0–0.2)
BASOPHILS RELATIVE PERCENT: 0.1 % (ref 0–1)
BILIRUB SERPL-MCNC: 0.6 MG/DL (ref 0.2–1.2)
BUN BLDV-MCNC: 45 MG/DL (ref 8–23)
CALCIUM SERPL-MCNC: 8.4 MG/DL (ref 8.2–9.6)
CHLORIDE BLD-SCNC: 103 MMOL/L (ref 98–111)
CO2: 18 MMOL/L (ref 22–29)
CREAT SERPL-MCNC: 1.3 MG/DL (ref 0.5–1.2)
D DIMER: 2.1 UG/ML FEU (ref 0–0.48)
EOSINOPHILS ABSOLUTE: 0 K/UL (ref 0–0.6)
EOSINOPHILS RELATIVE PERCENT: 0 % (ref 0–5)
FIBRINOGEN: 296 MG/DL (ref 238–505)
GFR AFRICAN AMERICAN: >59
GFR NON-AFRICAN AMERICAN: 51
GLUCOSE BLD-MCNC: 111 MG/DL (ref 74–109)
HCT VFR BLD CALC: 35.2 % (ref 42–52)
HEMOGLOBIN: 11.3 G/DL (ref 14–18)
IMMATURE GRANULOCYTES #: 0.2 K/UL
INR BLD: 1.54 (ref 0.88–1.18)
LYMPHOCYTES ABSOLUTE: 0.7 K/UL (ref 1.1–4.5)
LYMPHOCYTES RELATIVE PERCENT: 9.2 % (ref 20–40)
MAGNESIUM: 2.3 MG/DL (ref 1.7–2.3)
MCH RBC QN AUTO: 31 PG (ref 27–31)
MCHC RBC AUTO-ENTMCNC: 32.1 G/DL (ref 33–37)
MCV RBC AUTO: 96.4 FL (ref 80–94)
MONOCYTES ABSOLUTE: 0.5 K/UL (ref 0–0.9)
MONOCYTES RELATIVE PERCENT: 7.5 % (ref 0–10)
NEUTROPHILS ABSOLUTE: 5.7 K/UL (ref 1.5–7.5)
NEUTROPHILS RELATIVE PERCENT: 80.4 % (ref 50–65)
PDW BLD-RTO: 14 % (ref 11.5–14.5)
PLATELET # BLD: 143 K/UL (ref 130–400)
PMV BLD AUTO: 9.3 FL (ref 9.4–12.4)
POTASSIUM SERPL-SCNC: 4.7 MMOL/L (ref 3.5–5)
PROTHROMBIN TIME: 18.6 SEC (ref 12–14.6)
RBC # BLD: 3.65 M/UL (ref 4.7–6.1)
SODIUM BLD-SCNC: 132 MMOL/L (ref 136–145)
TOTAL PROTEIN: 5.1 G/DL (ref 6.6–8.7)
WBC # BLD: 7.1 K/UL (ref 4.8–10.8)

## 2020-12-25 PROCEDURE — 83735 ASSAY OF MAGNESIUM: CPT

## 2020-12-25 PROCEDURE — 85379 FIBRIN DEGRADATION QUANT: CPT

## 2020-12-25 PROCEDURE — 85025 COMPLETE CBC W/AUTO DIFF WBC: CPT

## 2020-12-25 PROCEDURE — 2700000000 HC OXYGEN THERAPY PER DAY

## 2020-12-25 PROCEDURE — 80053 COMPREHEN METABOLIC PANEL: CPT

## 2020-12-25 PROCEDURE — 1210000000 HC MED SURG R&B

## 2020-12-25 PROCEDURE — 85384 FIBRINOGEN ACTIVITY: CPT

## 2020-12-25 PROCEDURE — 85610 PROTHROMBIN TIME: CPT

## 2020-12-25 PROCEDURE — 6370000000 HC RX 637 (ALT 250 FOR IP): Performed by: INTERNAL MEDICINE

## 2020-12-25 PROCEDURE — 6360000002 HC RX W HCPCS: Performed by: INTERNAL MEDICINE

## 2020-12-25 PROCEDURE — 85730 THROMBOPLASTIN TIME PARTIAL: CPT

## 2020-12-25 RX ORDER — SODIUM BICARBONATE 650 MG/1
650 TABLET ORAL 4 TIMES DAILY
Status: DISCONTINUED | OUTPATIENT
Start: 2020-12-25 | End: 2020-12-27 | Stop reason: HOSPADM

## 2020-12-25 RX ADMIN — Medication 6000 UNITS: at 09:19

## 2020-12-25 RX ADMIN — ASPIRIN 81 MG: 81 TABLET, CHEWABLE ORAL at 09:19

## 2020-12-25 RX ADMIN — APIXABAN 10 MG: 5 TABLET, FILM COATED ORAL at 09:18

## 2020-12-25 RX ADMIN — FINASTERIDE 5 MG: 5 TABLET, FILM COATED ORAL at 09:19

## 2020-12-25 RX ADMIN — DEXAMETHASONE SODIUM PHOSPHATE 6 MG: 4 INJECTION, SOLUTION INTRAMUSCULAR; INTRAVENOUS at 09:18

## 2020-12-25 RX ADMIN — CLOPIDOGREL 75 MG: 75 TABLET, FILM COATED ORAL at 09:18

## 2020-12-25 RX ADMIN — BUDESONIDE AND FORMOTEROL FUMARATE DIHYDRATE 2 PUFF: 160; 4.5 AEROSOL RESPIRATORY (INHALATION) at 09:17

## 2020-12-25 RX ADMIN — DOCUSATE SODIUM 100 MG: 100 CAPSULE, LIQUID FILLED ORAL at 09:18

## 2020-12-25 RX ADMIN — TAMSULOSIN HYDROCHLORIDE 0.4 MG: 0.4 CAPSULE ORAL at 09:18

## 2020-12-25 RX ADMIN — CYANOCOBALAMIN TAB 500 MCG 1000 MCG: 500 TAB at 09:19

## 2020-12-25 RX ADMIN — SERTRALINE HYDROCHLORIDE 50 MG: 50 TABLET ORAL at 09:19

## 2020-12-25 RX ADMIN — ISOSORBIDE MONONITRATE 30 MG: 30 TABLET, EXTENDED RELEASE ORAL at 09:18

## 2020-12-25 RX ADMIN — PANTOPRAZOLE SODIUM 20 MG: 20 TABLET, DELAYED RELEASE ORAL at 09:19

## 2020-12-25 RX ADMIN — OXYCODONE HYDROCHLORIDE AND ACETAMINOPHEN 500 MG: 500 TABLET ORAL at 09:19

## 2020-12-25 NOTE — PROGRESS NOTES
Progress Note  Date:2020       Room:0412/412-02  Patient Isaac Rogers     YOB: 1925     Age:89 y.o. Subjective    Subjective: 89 y. o. male with a past medical history of CAD, CKD 3, hypertension who presented to the ED complaining of dyspnea from skilled nursing facility found to have acute respiratory failure with hypoxia requiring oxygen therapy.  Patient noted to have COVID-19, Patient is DNR/DNI. Blood culture positive for staph capitis, initial antibiotics were discontinued as likely secondary to contamination. Patient with elevated d-dimer - V/Q scan was indeterminate probability. Continue Eliquis as ordered. Seen today in his room, denied any acute overnight event, denied any chest pain or shortness of breath. Patient will be hard of hearing however answers questions adequately. Review of Systems: 12 point system reviewed and negative except as stated above. Objective         Vitals Last 24 Hours:  TEMPERATURE:  Temp  Av.4 °F (36.3 °C)  Min: 96.9 °F (36.1 °C)  Max: 98.1 °F (36.7 °C)  RESPIRATIONS RANGE: Resp  Av  Min: 16  Max: 18  PULSE OXIMETRY RANGE: SpO2  Av %  Min: 95 %  Max: 95 %  PULSE RANGE: Pulse  Av.7  Min: 68  Max: 96  BLOOD PRESSURE RANGE: Systolic (74RLT), HINA:581 , Min:128 , MOF:387   ; Diastolic (30LEC), FQP:82, Min:60, Max:72    I/O (24Hr): Intake/Output Summary (Last 24 hours) at 2020 1742  Last data filed at 2020 0056  Gross per 24 hour   Intake 240 ml   Output 1650 ml   Net -1410 ml       Physical examination:  Patient was monitored today in his room, appeared to be in no acute distress, answers questions appropriately, did not sound to be in any respiratory distress. Respiration seems unlabored, no evidence of dyspnea on conversation. Patient is alert with clear speech.       Labs/Imaging/Diagnostics    Labs:  CBC:  Recent Labs     20  0555 20  0330 20  0445   WBC 5.9 5.2 7.1 RBC 3.59* 3.69* 3.65*   HGB 11.2* 11.7* 11.3*   HCT 35.4* 35.6* 35.2*   MCV 98.6* 96.5* 96.4*   RDW 15.0* 14.5 14.0    141 143     CHEMISTRIES:  Recent Labs     12/23/20  0555 12/24/20  0330 12/25/20  0445   * 133* 132*   K 5.2* 5.2* 4.7    103 103   CO2 22 19* 18*   BUN 47* 46* 45*   CREATININE 1.9* 1.6* 1.3*   GLUCOSE 110* 117* 111*   MG 2.2 2.3 2.3     PT/INR:  Recent Labs     12/23/20  0555 12/24/20  0330 12/25/20  0445   PROTIME 14.0 15.4* 18.6*   INR 1.09 1.22* 1.54*     APTT:  Recent Labs     12/23/20  0555 12/24/20  0330 12/25/20  0445   APTT 38.2* 35.9 31.5     LIVER PROFILE:  Recent Labs     12/23/20  0555 12/24/20  0330 12/25/20  0445   AST 24 28 22   ALT 21 24 25   BILITOT 0.5 0.6 0.6   ALKPHOS 57 58 57       Imaging Last 24 Hours:  Nm Lung Scan Perfusion Only    Result Date: 12/23/2020  Examination. NM LUNG SCAN PERFUSION ONLY 12/23/2020 2:01 PM History: Hypoxia and elevated d-dimer. After the intravenous injection of 5.1 mCi of technetium 99m macroaggregated albumin, images of the lungs are obtained in anterior, posterior, both oblique and lateral projections with help of scintillation camera. There is no previous study for comparison. The ventilation scans are obtained. There are a few scattered nonsegmental defects in both lungs. There is a subsegmental defect involving the right lower lobe, anterior segment. A limited diagnostic study in the absence of a ventilation scan. An intermediate probability of pulmonary embolism of the right lower lobe. Further correlation with CT angiography of the chest may be obtained.  Signed by Dr Diane Mcghee on 12/23/2020 3:45 PM    Assessment//Plan           Hospital Problems           Last Modified POA    * (Principal) COVID-19 12/20/2020 Yes    LIU (acute kidney injury) ([facility] [address] 12/20/2020 Yes    GERD (gastroesophageal reflux disease) 12/20/2020 Yes    Essential hypertension 12/20/2020 Yes    Pneumonia 12/20/2020 Yes CKD (chronic kidney disease), stage III 12/20/2020 Yes        Assessment & Plan    COVID-19: Stable/mild improvement. Requiring 3-4L NC. Continue Decadron.    Hold remdesivir for renal function and some appreciable improvement.    Supportive management.    O2 as needed. Elevated d-dimer - V/Q scan indeterminant   Discontinue antibiotics and monitor fever curve. Wean off oxygen as tolerated     LIU on CKD 3:  Monitor BMP - improving.     Lactic acidosis: Improved     CAD: Recent admission for chest pain.    Placed on dual antiplatelet therapy.    Continue statin and Imdur  Poor candidate for invasive diagnostic/therapeutic intervention based on previous cardiology note.  Denies chest pain. Bacteremia; noted to be contaminant  Discontinue antibiotics and monitor fever curve  Repeat blood culture 12/24 and NGTD    Hypertension: Continue Imdur    BPH: Finasteride and Flomax. GERD: Continue pantoprazole       Advance Directive: DNR-CC  DVT prophylaxis: Heparin  Discharge planning: TBD      Electronically signed by   Hyacinth Murphy   Internal Medicine Hospitalist  On 12/25/2020  At 5:42 PM    EMR Dragon/Transcription disclaimer:   Much of this encounter note is an electronic transcription/translation of spoken language to printed text.  The electronic translation of spoken language may permit erroneous, or at times, nonsensical words or phrases to be inadvertently transcribed; although attempts have made to review the note for such errors, some may still exist.

## 2020-12-26 PROCEDURE — 2700000000 HC OXYGEN THERAPY PER DAY

## 2020-12-26 PROCEDURE — 1210000000 HC MED SURG R&B

## 2020-12-26 PROCEDURE — 85025 COMPLETE CBC W/AUTO DIFF WBC: CPT

## 2020-12-26 NOTE — PROGRESS NOTES
Progress Note  Date:12/26/2020       Room:0412/412-02  Patient Tanya Real     YOB: 1925     Age:89 y.o. Subjective    Subjective: 89 y. o. male with a past medical history of CAD, CKD 3, hypertension who presented to the ED complaining of dyspnea from skilled nursing facility found to have acute respiratory failure with hypoxia requiring oxygen therapy.  Patient noted to have COVID-19, Patient is DNR/DNI. Blood culture positive for staph capitis, initial antibiotics were discontinued as likely secondary to contamination. Patient with elevated d-dimer - V/Q scan was indeterminate probability. Continue Eliquis as ordered. Seen today in his room, denied any acute overnight event, denied any chest pain or shortness of breath. Patient will be hard of hearing however answers questions adequately. Review of Systems: 12 point system reviewed and negative except as stated above. Objective         Vitals Last 24 Hours:  TEMPERATURE:  No data recorded  RESPIRATIONS RANGE: No data recorded  PULSE OXIMETRY RANGE: No data recorded  PULSE RANGE: No data recorded  BLOOD PRESSURE RANGE: No data recorded.  ; No data recorded. I/O (24Hr): No intake or output data in the 24 hours ending 12/26/20 1143    Physical examination:  Patient was monitored today in his room, appeared to be in no acute distress, answers questions appropriately, did not sound to be in any respiratory distress. Respiration seems unlabored, no evidence of dyspnea on conversation. Patient is alert with clear speech.       Labs/Imaging/Diagnostics    Labs:  CBC:  Recent Labs     12/24/20  0330 12/25/20  0445   WBC 5.2 7.1   RBC 3.69* 3.65*   HGB 11.7* 11.3*   HCT 35.6* 35.2*   MCV 96.5* 96.4*   RDW 14.5 14.0    143     CHEMISTRIES:  Recent Labs     12/24/20  0330 12/25/20  0445   * 132*   K 5.2* 4.7    103   CO2 19* 18*   BUN 46* 45*   CREATININE 1.6* 1.3*   GLUCOSE 117* 111*   MG 2.3 2.3     PT/INR: Recent Labs     12/24/20 0330 12/25/20  0445   PROTIME 15.4* 18.6*   INR 1.22* 1.54*     APTT:  Recent Labs     12/24/20 0330 12/25/20 0445   APTT 35.9 31.5     LIVER PROFILE:  Recent Labs     12/24/20 0330 12/25/20 0445   AST 28 22   ALT 24 25   BILITOT 0.6 0.6   ALKPHOS 58 57       Imaging Last 24 Hours:  Nm Lung Scan Perfusion Only    Result Date: 12/23/2020  Examination. NM LUNG SCAN PERFUSION ONLY 12/23/2020 2:01 PM History: Hypoxia and elevated d-dimer. After the intravenous injection of 5.1 mCi of technetium 99m macroaggregated albumin, images of the lungs are obtained in anterior, posterior, both oblique and lateral projections with help of scintillation camera. There is no previous study for comparison. The ventilation scans are obtained. There are a few scattered nonsegmental defects in both lungs. There is a subsegmental defect involving the right lower lobe, anterior segment. A limited diagnostic study in the absence of a ventilation scan. An intermediate probability of pulmonary embolism of the right lower lobe. Further correlation with CT angiography of the chest may be obtained. Signed by Dr Ese Dawson on 12/23/2020 3:45 PM    Assessment//Plan           Hospital Problems           Last Modified POA    * (Principal) COVID-19 12/20/2020 Yes    LIU (acute kidney injury) ([facility] [address] 12/20/2020 Yes    GERD (gastroesophageal reflux disease) 12/20/2020 Yes    Essential hypertension 12/20/2020 Yes    Pneumonia 12/20/2020 Yes    CKD (chronic kidney disease), stage III 12/20/2020 Yes        Assessment & Plan    COVID-19: Stable/mild improvement. Requiring 3L NC. Per patient uses oxygen at baseline, need to clarify with facility  Continue Decadron.    Hold remdesivir for renal function and some appreciable improvement. Supportive management.    O2 as needed. Elevated d-dimer - V/Q scan indeterminant   Discontinue antibiotics and monitor fever curve.   Wean off oxygen as tolerated

## 2020-12-27 VITALS
HEART RATE: 78 BPM | WEIGHT: 232.8 LBS | BODY MASS INDEX: 34.48 KG/M2 | RESPIRATION RATE: 14 BRPM | HEIGHT: 69 IN | DIASTOLIC BLOOD PRESSURE: 67 MMHG | TEMPERATURE: 96.3 F | SYSTOLIC BLOOD PRESSURE: 130 MMHG | OXYGEN SATURATION: 92 %

## 2020-12-27 LAB
ALBUMIN SERPL-MCNC: 3 G/DL (ref 3.5–5.2)
ALP BLD-CCNC: 69 U/L (ref 40–130)
ALT SERPL-CCNC: 30 U/L (ref 5–41)
ANION GAP SERPL CALCULATED.3IONS-SCNC: 8 MMOL/L (ref 7–19)
APTT: 36.8 SEC (ref 26–36.2)
AST SERPL-CCNC: 21 U/L (ref 5–40)
BASOPHILS ABSOLUTE: 0 K/UL (ref 0–0.2)
BASOPHILS RELATIVE PERCENT: 0.2 % (ref 0–1)
BILIRUB SERPL-MCNC: 0.9 MG/DL (ref 0.2–1.2)
BUN BLDV-MCNC: 43 MG/DL (ref 8–23)
CALCIUM SERPL-MCNC: 8.5 MG/DL (ref 8.2–9.6)
CHLORIDE BLD-SCNC: 103 MMOL/L (ref 98–111)
CO2: 23 MMOL/L (ref 22–29)
CREAT SERPL-MCNC: 1.3 MG/DL (ref 0.5–1.2)
D DIMER: 1.91 UG/ML FEU (ref 0–0.48)
EOSINOPHILS ABSOLUTE: 0 K/UL (ref 0–0.6)
EOSINOPHILS RELATIVE PERCENT: 0.3 % (ref 0–5)
FIBRINOGEN: 340 MG/DL (ref 238–505)
GFR AFRICAN AMERICAN: >59
GFR NON-AFRICAN AMERICAN: 51
GLUCOSE BLD-MCNC: 87 MG/DL (ref 74–109)
HCT VFR BLD CALC: 36.6 % (ref 42–52)
HEMOGLOBIN: 12.1 G/DL (ref 14–18)
IMMATURE GRANULOCYTES #: 0.5 K/UL
INR BLD: 1.58 (ref 0.88–1.18)
LYMPHOCYTES ABSOLUTE: 0.8 K/UL (ref 1.1–4.5)
LYMPHOCYTES RELATIVE PERCENT: 12.7 % (ref 20–40)
MAGNESIUM: 2.4 MG/DL (ref 1.7–2.3)
MCH RBC QN AUTO: 31.3 PG (ref 27–31)
MCHC RBC AUTO-ENTMCNC: 33.1 G/DL (ref 33–37)
MCV RBC AUTO: 94.6 FL (ref 80–94)
MONOCYTES ABSOLUTE: 0.6 K/UL (ref 0–0.9)
MONOCYTES RELATIVE PERCENT: 9.5 % (ref 0–10)
NEUTROPHILS ABSOLUTE: 4.3 K/UL (ref 1.5–7.5)
NEUTROPHILS RELATIVE PERCENT: 69.8 % (ref 50–65)
PDW BLD-RTO: 14.1 % (ref 11.5–14.5)
PLATELET # BLD: 170 K/UL (ref 130–400)
PMV BLD AUTO: 9.8 FL (ref 9.4–12.4)
POTASSIUM SERPL-SCNC: 4.7 MMOL/L (ref 3.5–5)
PROTHROMBIN TIME: 19 SEC (ref 12–14.6)
RBC # BLD: 3.87 M/UL (ref 4.7–6.1)
SODIUM BLD-SCNC: 134 MMOL/L (ref 136–145)
TOTAL PROTEIN: 5.4 G/DL (ref 6.6–8.7)
WBC # BLD: 6.1 K/UL (ref 4.8–10.8)

## 2020-12-27 PROCEDURE — 80053 COMPREHEN METABOLIC PANEL: CPT

## 2020-12-27 PROCEDURE — 6370000000 HC RX 637 (ALT 250 FOR IP): Performed by: HOSPITALIST

## 2020-12-27 PROCEDURE — 2700000000 HC OXYGEN THERAPY PER DAY

## 2020-12-27 PROCEDURE — 6360000002 HC RX W HCPCS: Performed by: HOSPITALIST

## 2020-12-27 PROCEDURE — 85025 COMPLETE CBC W/AUTO DIFF WBC: CPT

## 2020-12-27 PROCEDURE — 85379 FIBRIN DEGRADATION QUANT: CPT

## 2020-12-27 PROCEDURE — 6370000000 HC RX 637 (ALT 250 FOR IP): Performed by: INTERNAL MEDICINE

## 2020-12-27 PROCEDURE — 6360000002 HC RX W HCPCS: Performed by: INTERNAL MEDICINE

## 2020-12-27 PROCEDURE — 85730 THROMBOPLASTIN TIME PARTIAL: CPT

## 2020-12-27 PROCEDURE — 83735 ASSAY OF MAGNESIUM: CPT

## 2020-12-27 PROCEDURE — 85610 PROTHROMBIN TIME: CPT

## 2020-12-27 PROCEDURE — 85384 FIBRINOGEN ACTIVITY: CPT

## 2020-12-27 RX ORDER — FUROSEMIDE 10 MG/ML
60 INJECTION INTRAMUSCULAR; INTRAVENOUS ONCE
Status: COMPLETED | OUTPATIENT
Start: 2020-12-27 | End: 2020-12-27

## 2020-12-27 RX ORDER — SENNA PLUS 8.6 MG/1
1 TABLET ORAL NIGHTLY
Qty: 30 TABLET | Refills: 0 | Status: SHIPPED | OUTPATIENT
Start: 2020-12-27 | End: 2021-01-26

## 2020-12-27 RX ORDER — DEXAMETHASONE 6 MG/1
6 TABLET ORAL
Qty: 2 TABLET | Refills: 0 | Status: SHIPPED | OUTPATIENT
Start: 2020-12-27 | End: 2020-12-29

## 2020-12-27 RX ADMIN — FUROSEMIDE 60 MG: 10 INJECTION, SOLUTION INTRAMUSCULAR; INTRAVENOUS at 11:47

## 2020-12-27 RX ADMIN — CYANOCOBALAMIN TAB 500 MCG 1000 MCG: 500 TAB at 08:48

## 2020-12-27 RX ADMIN — PANTOPRAZOLE SODIUM 20 MG: 20 TABLET, DELAYED RELEASE ORAL at 08:49

## 2020-12-27 RX ADMIN — BUDESONIDE AND FORMOTEROL FUMARATE DIHYDRATE 2 PUFF: 160; 4.5 AEROSOL RESPIRATORY (INHALATION) at 08:50

## 2020-12-27 RX ADMIN — ASPIRIN 81 MG: 81 TABLET, CHEWABLE ORAL at 08:49

## 2020-12-27 RX ADMIN — DOCUSATE SODIUM 100 MG: 100 CAPSULE, LIQUID FILLED ORAL at 08:49

## 2020-12-27 RX ADMIN — APIXABAN 10 MG: 5 TABLET, FILM COATED ORAL at 08:49

## 2020-12-27 RX ADMIN — LACTULOSE 20 G: 20 SOLUTION ORAL at 09:42

## 2020-12-27 RX ADMIN — FINASTERIDE 5 MG: 5 TABLET, FILM COATED ORAL at 08:47

## 2020-12-27 RX ADMIN — DEXAMETHASONE SODIUM PHOSPHATE 6 MG: 4 INJECTION, SOLUTION INTRAMUSCULAR; INTRAVENOUS at 08:49

## 2020-12-27 RX ADMIN — OXYCODONE HYDROCHLORIDE AND ACETAMINOPHEN 500 MG: 500 TABLET ORAL at 08:48

## 2020-12-27 RX ADMIN — SODIUM BICARBONATE 650 MG: 650 TABLET ORAL at 11:47

## 2020-12-27 RX ADMIN — SODIUM BICARBONATE 650 MG: 650 TABLET ORAL at 08:48

## 2020-12-27 RX ADMIN — Medication 6000 UNITS: at 08:47

## 2020-12-27 RX ADMIN — ISOSORBIDE MONONITRATE 30 MG: 30 TABLET, EXTENDED RELEASE ORAL at 08:48

## 2020-12-27 RX ADMIN — SERTRALINE HYDROCHLORIDE 50 MG: 50 TABLET ORAL at 08:48

## 2020-12-27 RX ADMIN — TAMSULOSIN HYDROCHLORIDE 0.4 MG: 0.4 CAPSULE ORAL at 08:47

## 2020-12-27 RX ADMIN — BISACODYL 10 MG: 10 SUPPOSITORY RECTAL at 09:42

## 2020-12-27 RX ADMIN — CLOPIDOGREL 75 MG: 75 TABLET, FILM COATED ORAL at 08:48

## 2020-12-27 NOTE — PROGRESS NOTES
O2 sat on 3 LPM via NC 93%  O2 sat on RA 84%, recovered to 92% with 3 LPM via NC  Pt has left sided paralysis and does not ambulate

## 2020-12-27 NOTE — DISCHARGE INSTR - COC
Continuity of Care Form    Patient Name: Deepa Conte   :  1925  MRN:  [id number]    Admit date:  2020  Discharge date:  20    Code Status Order: [facility]   Advance Directives:   [facility] Documentation     Date/Time Healthcare Directive Type of Healthcare Directive Copy in [address] Agent's Name Healthcare Agent's Phone Number    20 1202  Yes, patient has an advance directive for healthcare treatment  Living will  No, copy requested from family  Adult [facility]  [phone]    20 1131  Yes, patient has an advance directive for healthcare treatment  Living will  No, copy requested from family  Adult [facility]  [phone]          Admitting Physician:  Tali Cunningham MD  PCP: No primary care provider on file. 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 .    Infection Onset Last Indicated Last Received Source    No mapped external infections found    . Unmapped Infections      ESBL E coli 03/07/20 03/07/20 12/20/20 Gabby            Patient Infection Status     Infection Onset Added Last Indicated Last Indicated By Review Planned Expiration Resolved Resolved By    COVID-19 12/20/20 12/20/20 12/20/20 Respiratory Panel, Molecular, with COVID-19 (Restricted: peds pts or suitable admitted adults) 12/27/20 01/03/21 12/20/2020 COVID-19: SARS-CoV-2:  DETECTED      ESBL (Extended Spectrum Beta Lactamase) 03/09/20 03/12/20 03/09/20 Culture, Respiratory        03/10/2020 Respiratory culture: Escherichia coli ESBL    MRSA 03/08/20 03/09/20 03/08/20 Culture, MRSA, Screening        03/08/2020 MRSA Screening culture (nares):  MRSA Positive      Resolved    COVID-19 Rule Out 12/20/20 12/20/20 12/20/20 Respiratory Panel, Molecular, with COVID-19 (Restricted: peds pts or suitable admitted adults) (Ordered)   12/20/20 Rule-Out Test Resulted    COVID-19 Rule Out 07/22/20 07/22/20 07/22/20 COVID-19 (Ordered)   07/22/20 Rule-Out Test Resulted    COVID-19 Rule Out 07/21/20 07/21/20 07/21/20 COVID-19 (Ordered)   07/22/20 Chan Miranda RN    COVID Test discontinued by provider          Nurse Assessment: Patient's personal belongings (please select all that are sent with patient):  Hearing Aides bilateral    RN SIGNATURE:  Electronically signed by Bobby Clulen RN on 12/27/20 at 10:52 AM CST    CASE MANAGEMENT/SOCIAL WORK SECTION    Inpatient Status Date: ***    Readmission Risk Assessment Score:  Readmission Risk              Risk of Unplanned Readmission:        35           Discharging to Facility/ Agency   · Name:   · Address:  · Phone:  · Fax:    Dialysis Facility (if applicable)   · Name:  · Address:  · Dialysis Schedule:  · Phone:  · Fax:    / signature: {Esignature:[id number]}    PHYSICIAN SECTION    Prognosis: {Prognosis:5261979899}    Condition at Discharge: Jaguar[address] Lizzie Jhaveri Patient Condition:407345544}    Rehab Potential (if transferring to Rehab): {Prognosis:2731588401}    Recommended Labs or Other Treatments After Discharge: ***    Physician Certification: I certify the above information and transfer of Nidia Guerrero  is necessary for the continuing treatment of the diagnosis listed and that he requires {Admit to Appropriate Level of Care:14847} for {GREATER/LESS:320779452} 30 days.      Update Admission H&P: {CHP DME Changes in QYQSD:919237838}    PHYSICIAN SIGNATURE:  {Esignature:[id number]}

## 2020-12-27 NOTE — PROGRESS NOTES
Report called to [facility]. [facility] notified of need for transportation.

## 2020-12-27 NOTE — DISCHARGE SUMMARY
Recent Labs:  CBC:   Lab Results   Component Value Date    WBC 6.1 12/27/2020    RBC 3.87 12/27/2020    HGB 12.1 12/27/2020    HCT 36.6 12/27/2020    MCV 94.6 12/27/2020    MCH 31.3 12/27/2020    MCHC 33.1 12/27/2020    RDW 14.1 12/27/2020     12/27/2020     BMP:    Lab Results   Component Value Date    GLUCOSE 87 12/27/2020     12/27/2020    K 4.7 12/27/2020    K 4.8 12/20/2020     12/27/2020    CO2 23 12/27/2020    ANIONGAP 8 12/27/2020    BUN 43 12/27/2020    CREATININE 1.3 12/27/2020    CALCIUM 8.5 12/27/2020    LABGLOM 51 12/27/2020    GFRAA >59 12/27/2020       Radiology Last 7 Days:  Nm Lung Scan Perfusion Only    Result Date: 12/23/2020  A limited diagnostic study in the absence of a ventilation scan. An intermediate probability of pulmonary embolism of the right lower lobe. Further correlation with CT angiography of the chest may be obtained. Signed by Dr Nidia Mcdowell on 12/23/2020 3:45 PM    Xr Chest Portable    Result Date: 12/20/2020  1. Patchy left lower lobe interstitial infiltrate compatible with pneumonia. Signed by Dr Bryan Tena on 12/20/2020 6:25 PM      Discharge Plan   Disposition: To a non-[facility] facility    Provider Follow-Up:   Frandy Foote MD  [facility]  [address] [place] [address]  [phone]      He will follow-up with infectious diseases as needed.   CBL       Patient Instructions   Diet: cardiac diet and renal diet    Activity: activity as tolerated      Discharge Medications         Medication List      START taking these medications    apixaban 5 MG Tabs tablet  Commonly known as: ELIQUIS  Take 1 tablet by mouth 2 times daily Take 10mg till 12/29 then reduce to 5mg BID 12/30  Start taking on: December 30, 2020     dexamethasone 6 MG tablet  Commonly known as: DECADRON  Take 1 tablet by mouth daily (with breakfast) for 2 days     senna 8.6 MG tablet  Commonly known as: SENOKOT  Take 1 tablet by mouth nightly · dexamethasone 6 MG tablet  · senna 8.6 MG tablet         Electronically signed by Avani Madrigal MD on 12/27/20 at 12:04 PM CST

## 2020-12-28 ENCOUNTER — CARE COORDINATION (OUTPATIENT)
Dept: CASE MANAGEMENT | Age: 85
End: 2020-12-28

## 2020-12-28 LAB — L. PNEUMOPHILA SEROGP 1 UR AG: NORMAL

## 2020-12-29 LAB
BLOOD CULTURE, ROUTINE: NORMAL
CULTURE, BLOOD 2: NORMAL

## 2021-01-10 LAB
BASOPHILS ABSOLUTE: 0 K/UL (ref 0–0.2)
BASOPHILS RELATIVE PERCENT: 0.3 % (ref 0–1)
EOSINOPHILS ABSOLUTE: 0 K/UL (ref 0–0.6)
EOSINOPHILS RELATIVE PERCENT: 0.3 % (ref 0–5)
HCT VFR BLD CALC: 38.3 % (ref 42–52)
HEMOGLOBIN: 12.8 G/DL (ref 14–18)
IMMATURE GRANULOCYTES #: 0.4 K/UL
LYMPHOCYTES ABSOLUTE: 0.7 K/UL (ref 1.1–4.5)
LYMPHOCYTES RELATIVE PERCENT: 11 % (ref 20–40)
MCH RBC QN AUTO: 31.4 PG (ref 27–31)
MCHC RBC AUTO-ENTMCNC: 33.4 G/DL (ref 33–37)
MCV RBC AUTO: 94.1 FL (ref 80–94)
MONOCYTES ABSOLUTE: 0.5 K/UL (ref 0–0.9)
MONOCYTES RELATIVE PERCENT: 8 % (ref 0–10)
NEUTROPHILS ABSOLUTE: 4.9 K/UL (ref 1.5–7.5)
NEUTROPHILS RELATIVE PERCENT: 74.4 % (ref 50–65)
PDW BLD-RTO: 14 % (ref 11.5–14.5)
PLATELET # BLD: 165 K/UL (ref 130–400)
PMV BLD AUTO: 9.6 FL (ref 9.4–12.4)
RBC # BLD: 4.07 M/UL (ref 4.7–6.1)
WBC # BLD: 6.5 K/UL (ref 4.8–10.8)

## 2021-12-06 ENCOUNTER — HOSPITAL ENCOUNTER (INPATIENT)
Age: 86
LOS: 4 days | Discharge: NURSING, REHAB OR LONG-TERM CARE | DRG: 872 | End: 2021-12-10
Attending: EMERGENCY MEDICINE | Admitting: INTERNAL MEDICINE
Payer: MEDICARE

## 2021-12-06 DIAGNOSIS — M25.511 RIGHT SHOULDER PAIN, UNSPECIFIED CHRONICITY: ICD-10-CM

## 2021-12-06 DIAGNOSIS — N17.9 AKI (ACUTE KIDNEY INJURY) (HCC): Primary | ICD-10-CM

## 2021-12-06 PROBLEM — Z99.2 ACUTE RENAL FAILURE SUPERIMPOSED ON CHRONIC KIDNEY DISEASE, ON CHRONIC DIALYSIS (HCC): Status: ACTIVE | Noted: 2021-12-06

## 2021-12-06 PROBLEM — N18.9 ACUTE RENAL FAILURE SUPERIMPOSED ON CHRONIC KIDNEY DISEASE, ON CHRONIC DIALYSIS (HCC): Status: ACTIVE | Noted: 2021-12-06

## 2021-12-06 LAB
ADENOVIRUS BY PCR: NOT DETECTED
ALBUMIN SERPL-MCNC: 3.4 G/DL (ref 3.5–5.2)
ALP BLD-CCNC: 92 U/L (ref 40–130)
ALT SERPL-CCNC: 22 U/L (ref 5–41)
ANION GAP SERPL CALCULATED.3IONS-SCNC: 15 MMOL/L (ref 7–19)
AST SERPL-CCNC: 29 U/L (ref 5–40)
BACTERIA: NEGATIVE /HPF
BASOPHILS ABSOLUTE: 0 K/UL (ref 0–0.2)
BASOPHILS RELATIVE PERCENT: 0.2 % (ref 0–1)
BILIRUB SERPL-MCNC: 1.6 MG/DL (ref 0.2–1.2)
BILIRUBIN URINE: NEGATIVE
BLOOD, URINE: ABNORMAL
BORDETELLA PARAPERTUSSIS BY PCR: NOT DETECTED
BORDETELLA PERTUSSIS BY PCR: NOT DETECTED
BUN BLDV-MCNC: 68 MG/DL (ref 8–23)
CALCIUM SERPL-MCNC: 9 MG/DL (ref 8.2–9.6)
CHLAMYDOPHILIA PNEUMONIAE BY PCR: NOT DETECTED
CHLORIDE BLD-SCNC: 99 MMOL/L (ref 98–111)
CLARITY: ABNORMAL
CO2: 23 MMOL/L (ref 22–29)
COLOR: YELLOW
CORONAVIRUS 229E BY PCR: NOT DETECTED
CORONAVIRUS HKU1 BY PCR: NOT DETECTED
CORONAVIRUS NL63 BY PCR: NOT DETECTED
CORONAVIRUS OC43 BY PCR: NOT DETECTED
CREAT SERPL-MCNC: 3.9 MG/DL (ref 0.5–1.2)
CRYSTALS, UA: ABNORMAL /HPF
EOSINOPHILS ABSOLUTE: 0 K/UL (ref 0–0.6)
EOSINOPHILS RELATIVE PERCENT: 0 % (ref 0–5)
EPITHELIAL CELLS, UA: 0 /HPF (ref 0–5)
GFR AFRICAN AMERICAN: 17
GFR NON-AFRICAN AMERICAN: 14
GLUCOSE BLD-MCNC: 139 MG/DL (ref 74–109)
GLUCOSE URINE: NEGATIVE MG/DL
HCT VFR BLD CALC: 38.9 % (ref 42–52)
HEMOGLOBIN: 12.7 G/DL (ref 14–18)
HUMAN METAPNEUMOVIRUS BY PCR: NOT DETECTED
HUMAN RHINOVIRUS/ENTEROVIRUS BY PCR: NOT DETECTED
HYALINE CASTS: 3 /HPF (ref 0–8)
IMMATURE GRANULOCYTES #: 0.1 K/UL
INFLUENZA A BY PCR: NOT DETECTED
INFLUENZA B BY PCR: NOT DETECTED
KETONES, URINE: NEGATIVE MG/DL
LACTIC ACID: 2.6 MMOL/L (ref 0.5–1.9)
LEUKOCYTE ESTERASE, URINE: ABNORMAL
LYMPHOCYTES ABSOLUTE: 0.6 K/UL (ref 1.1–4.5)
LYMPHOCYTES RELATIVE PERCENT: 5.1 % (ref 20–40)
MCH RBC QN AUTO: 32 PG (ref 27–31)
MCHC RBC AUTO-ENTMCNC: 32.6 G/DL (ref 33–37)
MCV RBC AUTO: 98 FL (ref 80–94)
MONOCYTES ABSOLUTE: 0.7 K/UL (ref 0–0.9)
MONOCYTES RELATIVE PERCENT: 5.2 % (ref 0–10)
MYCOPLASMA PNEUMONIAE BY PCR: NOT DETECTED
NEUTROPHILS ABSOLUTE: 11.1 K/UL (ref 1.5–7.5)
NEUTROPHILS RELATIVE PERCENT: 89 % (ref 50–65)
NITRITE, URINE: NEGATIVE
PARAINFLUENZA VIRUS 1 BY PCR: NOT DETECTED
PARAINFLUENZA VIRUS 2 BY PCR: NOT DETECTED
PARAINFLUENZA VIRUS 3 BY PCR: NOT DETECTED
PARAINFLUENZA VIRUS 4 BY PCR: NOT DETECTED
PDW BLD-RTO: 15.6 % (ref 11.5–14.5)
PH UA: 5 (ref 5–8)
PLATELET # BLD: 137 K/UL (ref 130–400)
PMV BLD AUTO: 10.3 FL (ref 9.4–12.4)
POTASSIUM SERPL-SCNC: 4.4 MMOL/L (ref 3.5–5)
PROTEIN UA: ABNORMAL MG/DL
RBC # BLD: 3.97 M/UL (ref 4.7–6.1)
RBC UA: 6 /HPF (ref 0–4)
RESPIRATORY SYNCYTIAL VIRUS BY PCR: NOT DETECTED
SARS-COV-2, PCR: NOT DETECTED
SODIUM BLD-SCNC: 137 MMOL/L (ref 136–145)
SPECIFIC GRAVITY UA: 1.01 (ref 1–1.03)
TOTAL PROTEIN: 6.2 G/DL (ref 6.6–8.7)
UROBILINOGEN, URINE: 0.2 E.U./DL
WBC # BLD: 12.4 K/UL (ref 4.8–10.8)
WBC UA: 106 /HPF (ref 0–5)

## 2021-12-06 PROCEDURE — 80053 COMPREHEN METABOLIC PANEL: CPT

## 2021-12-06 PROCEDURE — 87186 SC STD MICRODIL/AGAR DIL: CPT

## 2021-12-06 PROCEDURE — 83605 ASSAY OF LACTIC ACID: CPT

## 2021-12-06 PROCEDURE — 87086 URINE CULTURE/COLONY COUNT: CPT

## 2021-12-06 PROCEDURE — 2580000003 HC RX 258: Performed by: EMERGENCY MEDICINE

## 2021-12-06 PROCEDURE — 81001 URINALYSIS AUTO W/SCOPE: CPT

## 2021-12-06 PROCEDURE — 85025 COMPLETE CBC W/AUTO DIFF WBC: CPT

## 2021-12-06 PROCEDURE — 36415 COLL VENOUS BLD VENIPUNCTURE: CPT

## 2021-12-06 PROCEDURE — 87077 CULTURE AEROBIC IDENTIFY: CPT

## 2021-12-06 PROCEDURE — 1210000000 HC MED SURG R&B

## 2021-12-06 PROCEDURE — 99285 EMERGENCY DEPT VISIT HI MDM: CPT

## 2021-12-06 PROCEDURE — 0202U NFCT DS 22 TRGT SARS-COV-2: CPT

## 2021-12-06 RX ORDER — TAMSULOSIN HYDROCHLORIDE 0.4 MG/1
0.4 CAPSULE ORAL 2 TIMES DAILY
Status: DISCONTINUED | OUTPATIENT
Start: 2021-12-07 | End: 2021-12-11 | Stop reason: HOSPADM

## 2021-12-06 RX ORDER — ACETAMINOPHEN 325 MG/1
650 TABLET ORAL EVERY 4 HOURS PRN
Status: DISCONTINUED | OUTPATIENT
Start: 2021-12-06 | End: 2021-12-08

## 2021-12-06 RX ORDER — ISOSORBIDE MONONITRATE 30 MG/1
30 TABLET, EXTENDED RELEASE ORAL DAILY
Status: DISCONTINUED | OUTPATIENT
Start: 2021-12-07 | End: 2021-12-11 | Stop reason: HOSPADM

## 2021-12-06 RX ORDER — METOPROLOL SUCCINATE 25 MG/1
25 TABLET, EXTENDED RELEASE ORAL DAILY
Status: DISCONTINUED | OUTPATIENT
Start: 2021-12-07 | End: 2021-12-11 | Stop reason: HOSPADM

## 2021-12-06 RX ORDER — ACYCLOVIR 50 MG/G
OINTMENT TOPICAL 2 TIMES DAILY
Status: DISCONTINUED | OUTPATIENT
Start: 2021-12-06 | End: 2021-12-07 | Stop reason: SDUPTHER

## 2021-12-06 RX ORDER — FINASTERIDE 5 MG/1
5 TABLET, FILM COATED ORAL DAILY
Status: DISCONTINUED | OUTPATIENT
Start: 2021-12-07 | End: 2021-12-11 | Stop reason: HOSPADM

## 2021-12-06 RX ORDER — 0.9 % SODIUM CHLORIDE 0.9 %
500 INTRAVENOUS SOLUTION INTRAVENOUS ONCE
Status: COMPLETED | OUTPATIENT
Start: 2021-12-06 | End: 2021-12-07

## 2021-12-06 RX ORDER — CLOPIDOGREL BISULFATE 75 MG/1
75 TABLET ORAL DAILY
Status: DISCONTINUED | OUTPATIENT
Start: 2021-12-07 | End: 2021-12-11 | Stop reason: HOSPADM

## 2021-12-06 RX ORDER — ALBUTEROL SULFATE 90 UG/1
2 AEROSOL, METERED RESPIRATORY (INHALATION) EVERY 4 HOURS PRN
Status: DISCONTINUED | OUTPATIENT
Start: 2021-12-06 | End: 2021-12-07 | Stop reason: CLARIF

## 2021-12-06 RX ORDER — SODIUM CHLORIDE 9 MG/ML
INJECTION, SOLUTION INTRAVENOUS CONTINUOUS
Status: DISCONTINUED | OUTPATIENT
Start: 2021-12-06 | End: 2021-12-08

## 2021-12-06 RX ORDER — MELATONIN 10 MG
10 CAPSULE ORAL NIGHTLY
Status: DISCONTINUED | OUTPATIENT
Start: 2021-12-07 | End: 2021-12-11 | Stop reason: HOSPADM

## 2021-12-06 RX ORDER — LOPERAMIDE HYDROCHLORIDE 2 MG/1
2 CAPSULE ORAL 4 TIMES DAILY PRN
Status: DISCONTINUED | OUTPATIENT
Start: 2021-12-06 | End: 2021-12-11 | Stop reason: HOSPADM

## 2021-12-06 RX ADMIN — SODIUM CHLORIDE 500 ML: 9 INJECTION, SOLUTION INTRAVENOUS at 22:45

## 2021-12-06 ASSESSMENT — ENCOUNTER SYMPTOMS
VOMITING: 0
COUGH: 0
SHORTNESS OF BREATH: 0
ABDOMINAL PAIN: 0
DIARRHEA: 0

## 2021-12-07 PROBLEM — N18.9 ACUTE KIDNEY INJURY SUPERIMPOSED ON CHRONIC KIDNEY DISEASE (HCC): Status: ACTIVE | Noted: 2021-12-07

## 2021-12-07 PROBLEM — N39.0 UTI (URINARY TRACT INFECTION): Status: ACTIVE | Noted: 2021-12-07

## 2021-12-07 PROBLEM — N17.9 ACUTE KIDNEY INJURY SUPERIMPOSED ON CHRONIC KIDNEY DISEASE (HCC): Status: ACTIVE | Noted: 2021-12-07

## 2021-12-07 PROBLEM — E86.0 DEHYDRATION: Status: ACTIVE | Noted: 2021-12-07

## 2021-12-07 LAB
ALBUMIN SERPL-MCNC: 3.4 G/DL (ref 3.5–5.2)
ALP BLD-CCNC: 89 U/L (ref 40–130)
ALT SERPL-CCNC: 22 U/L (ref 5–41)
ANION GAP SERPL CALCULATED.3IONS-SCNC: 19 MMOL/L (ref 7–19)
AST SERPL-CCNC: 27 U/L (ref 5–40)
BILIRUB SERPL-MCNC: 1.2 MG/DL (ref 0.2–1.2)
BUN BLDV-MCNC: 70 MG/DL (ref 8–23)
CALCIUM SERPL-MCNC: 8.9 MG/DL (ref 8.2–9.6)
CHLORIDE BLD-SCNC: 102 MMOL/L (ref 98–111)
CO2: 19 MMOL/L (ref 22–29)
CREAT SERPL-MCNC: 4 MG/DL (ref 0.5–1.2)
GFR AFRICAN AMERICAN: 17
GFR NON-AFRICAN AMERICAN: 14
GLUCOSE BLD-MCNC: 108 MG/DL (ref 74–109)
HCT VFR BLD CALC: 40.5 % (ref 42–52)
HEMOGLOBIN: 12.4 G/DL (ref 14–18)
MCH RBC QN AUTO: 31.4 PG (ref 27–31)
MCHC RBC AUTO-ENTMCNC: 30.6 G/DL (ref 33–37)
MCV RBC AUTO: 102.5 FL (ref 80–94)
PDW BLD-RTO: 15.8 % (ref 11.5–14.5)
PLATELET # BLD: 147 K/UL (ref 130–400)
PMV BLD AUTO: 10.1 FL (ref 9.4–12.4)
POTASSIUM SERPL-SCNC: 4.4 MMOL/L (ref 3.5–5)
RBC # BLD: 3.95 M/UL (ref 4.7–6.1)
SODIUM BLD-SCNC: 140 MMOL/L (ref 136–145)
TOTAL PROTEIN: 5.4 G/DL (ref 6.6–8.7)
WBC # BLD: 11.7 K/UL (ref 4.8–10.8)

## 2021-12-07 PROCEDURE — 36415 COLL VENOUS BLD VENIPUNCTURE: CPT

## 2021-12-07 PROCEDURE — 6370000000 HC RX 637 (ALT 250 FOR IP): Performed by: INTERNAL MEDICINE

## 2021-12-07 PROCEDURE — 1210000000 HC MED SURG R&B

## 2021-12-07 PROCEDURE — 94640 AIRWAY INHALATION TREATMENT: CPT

## 2021-12-07 PROCEDURE — 6360000002 HC RX W HCPCS: Performed by: INTERNAL MEDICINE

## 2021-12-07 PROCEDURE — 87040 BLOOD CULTURE FOR BACTERIA: CPT

## 2021-12-07 PROCEDURE — 85027 COMPLETE CBC AUTOMATED: CPT

## 2021-12-07 PROCEDURE — 80053 COMPREHEN METABOLIC PANEL: CPT

## 2021-12-07 PROCEDURE — 2700000000 HC OXYGEN THERAPY PER DAY

## 2021-12-07 PROCEDURE — 2580000003 HC RX 258: Performed by: INTERNAL MEDICINE

## 2021-12-07 PROCEDURE — 6360000002 HC RX W HCPCS: Performed by: HOSPITALIST

## 2021-12-07 RX ORDER — ACYCLOVIR 800 MG/1
800 TABLET ORAL
Status: DISCONTINUED | OUTPATIENT
Start: 2021-12-07 | End: 2021-12-07

## 2021-12-07 RX ORDER — ALBUTEROL SULFATE 2.5 MG/3ML
2.5 SOLUTION RESPIRATORY (INHALATION) EVERY 4 HOURS PRN
Status: DISCONTINUED | OUTPATIENT
Start: 2021-12-07 | End: 2021-12-11 | Stop reason: HOSPADM

## 2021-12-07 RX ADMIN — IPRATROPIUM BROMIDE 0.5 MG: 0.5 SOLUTION RESPIRATORY (INHALATION) at 14:07

## 2021-12-07 RX ADMIN — IPRATROPIUM BROMIDE 0.5 MG: 0.5 SOLUTION RESPIRATORY (INHALATION) at 19:50

## 2021-12-07 RX ADMIN — Medication 10 MG: at 20:45

## 2021-12-07 RX ADMIN — CLOPIDOGREL BISULFATE 75 MG: 75 TABLET ORAL at 08:22

## 2021-12-07 RX ADMIN — ENOXAPARIN SODIUM 30 MG: 100 INJECTION SUBCUTANEOUS at 18:01

## 2021-12-07 RX ADMIN — FINASTERIDE 5 MG: 5 TABLET, FILM COATED ORAL at 08:22

## 2021-12-07 RX ADMIN — TAMSULOSIN HYDROCHLORIDE 0.4 MG: 0.4 CAPSULE ORAL at 20:45

## 2021-12-07 RX ADMIN — SODIUM CHLORIDE, PRESERVATIVE FREE 2000 MG: 5 INJECTION INTRAVENOUS at 02:36

## 2021-12-07 RX ADMIN — SERTRALINE HYDROCHLORIDE 50 MG: 50 TABLET ORAL at 08:22

## 2021-12-07 RX ADMIN — TAMSULOSIN HYDROCHLORIDE 0.4 MG: 0.4 CAPSULE ORAL at 08:22

## 2021-12-07 RX ADMIN — METOPROLOL SUCCINATE 25 MG: 25 TABLET, EXTENDED RELEASE ORAL at 08:22

## 2021-12-07 RX ADMIN — IPRATROPIUM BROMIDE 0.5 MG: 0.5 SOLUTION RESPIRATORY (INHALATION) at 06:40

## 2021-12-07 ASSESSMENT — PAIN SCALES - GENERAL: PAINLEVEL_OUTOF10: 0

## 2021-12-07 ASSESSMENT — ENCOUNTER SYMPTOMS
EYES NEGATIVE: 1
RESPIRATORY NEGATIVE: 1
ABDOMINAL DISTENTION: 1

## 2021-12-07 NOTE — ED PROVIDER NOTES
[facility]  eMERGENCY dEPARTMENT eNCOUnter      Pt Name: Mak Jimenez  MRN: [id number]  [place] 7/20/1925  Date of evaluation: 12/6/2021  Provider: Breanne Hummel MD    [address] [place] [address]       Chief Complaint   Patient presents with    Abnormal Lab         HISTORY OF PRESENT ILLNESS   (Location/Symptom, Timing/Onset,Context/Setting, Quality, Duration, Modifying Factors, Severity)  Note limiting factors. Mak Jimenez is a 80 y.o. male who presents to the emergency department for evaluation regarding abnormal laboratory studies. Patient is a resident at the nursing home and apparently has not felt well for the past couple of days. They did some outpatient lab work and noted that it was abnormal and he was sent to the ED for further evaluation. Upon arrival to the ED patient is alert and speaking. He tells me that he is not really had much energy and does not feel well. He is on chronic oxygen therapy and is nonambulatory secondary to a previous CVA. Patient arrived here to the ED with his son. HPI    NursingNotes were reviewed. REVIEW OF SYSTEMS    (2-9 systems for level 4, 10 or more for level 5)     Review of Systems   Constitutional: Positive for appetite change. Negative for chills and fever. Respiratory: Negative for cough and shortness of breath. Cardiovascular: Negative for chest pain. Gastrointestinal: Negative for abdominal pain, diarrhea and vomiting. All other systems reviewed and are negative.            PAST MEDICALHISTORY     Past Medical History:   Diagnosis Date    Acute kidney failure ([address]     Admitted with pneumonia     Cancer ([address]     prostate    Cerebral artery occlusion with cerebral infarction ([address]     Depression     GERD (gastroesophageal reflux disease)     Hemiplegia and hemiparesis following cerebral infarction affecting left dominant side ([address]     Hyperlipidemia     Hypertension     Palliative care patient 02/24/2020    TIA (transient ischemic attack)     Vitamin D deficiency          SURGICAL HISTORY       Past Surgical History:   Procedure Laterality Date    ANKLE FUSION      APPENDECTOMY      CHOLECYSTECTOMY      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    KNEE ARTHROPLASTY      bilateral         CURRENT MEDICATIONS     Previous Medications    ACETAMINOPHEN (TYLENOL) 325 MG TABLET    Take 650 mg by mouth every 6 hours as needed for Pain    ACYCLOVIR (ZOVIRAX) 5 % OINTMENT    Apply topically 2 times daily To lip.     ALBUTEROL SULFATE HFA (VENTOLIN HFA) 108 (90 BASE) MCG/ACT INHALER    Inhale 2 puffs into the lungs every 6 hours as needed for Wheezing    APIXABAN (ELIQUIS) 5 MG TABS TABLET    Take 1 tablet by mouth 2 times daily Take 10mg till 12/29 then reduce to 5mg BID 12/30    ASCORBIC ACID (VITAMIN C) 500 MG TABLET    Take 500 mg by mouth 2 times daily     ASPIRIN 81 MG CHEWABLE TABLET    Take 1 tablet by mouth daily    BUMETANIDE (BUMEX) 0.5 MG TABLET    Take 0.5 mg by mouth daily    CALCIUM CARBONATE (TUMS) 500 MG CHEWABLE TABLET    Take 1 tablet by mouth daily as needed for Heartburn    CARBOXYMETHYLCELLULOSE 1 % OPHTHALMIC SOLUTION    1 drop 2 times daily    CHOLECALCIFEROL (VITAMIN D3) 2000 UNITS CAPS    Take 2,000 Units by mouth daily    CLOPIDOGREL (PLAVIX) 75 MG TABLET    Take 75 mg by mouth daily    EMOLLIENT EX    Apply topically 2 times daily Apply to arms and legs    FINASTERIDE (PROSCAR) 5 MG TABLET    Take 1 tablet by mouth daily    FLUTICASONE (FLONASE) 50 MCG/ACT NASAL SPRAY    2 sprays by Each Nostril route nightly as needed for Rhinitis    ISOSORBIDE MONONITRATE (IMDUR) 30 MG EXTENDED RELEASE TABLET    Take 1 tablet by mouth daily    LOPERAMIDE (IMODIUM) 2 MG CAPSULE    Take 2 mg by mouth 4 times daily as needed for Diarrhea    MAGNESIUM CHLORIDE-CALCIUM  MG    Take 1 tablet by mouth nightly    MELATONIN 10 MG TABS    Take 10 mg by mouth nightly as needed    METOPROLOL SUCCINATE (TOPROL XL) 50 MG EXTENDED RELEASE TABLET    Take 1 tablet by mouth daily    NITROGLYCERIN (NITROSTAT) 0.4 MG SL TABLET    Place 0.4 mg under the tongue every 5 minutes as needed for Chest pain up to max of 3 total doses. If no relief after 1 dose, call 911.     ONDANSETRON (ZOFRAN) 4 MG TABLET    Take 4 mg by mouth every 8 hours as needed for Nausea or Vomiting    PANTOPRAZOLE (PROTONIX) 20 MG TABLET    Take 20 mg by mouth daily    POLYETHYLENE GLYCOL (MIRALAX) POWDER    Take 17 g by mouth daily as needed (constipation)    PRAVASTATIN (PRAVACHOL) 20 MG TABLET    20 mg nightly     SERTRALINE (ZOLOFT) 50 MG TABLET    Take 50 mg by mouth daily    TAMSULOSIN (FLOMAX) 0.4 MG CAPSULE    Take 1 capsule by mouth 2 times daily    TIOTROPIUM (SPIRIVA RESPIMAT) 1.25 MCG/ACT AERS INHALER    Inhale 1 puff into the lungs daily    VITAMIN B-12 (CYANOCOBALAMIN) 1000 MCG TABLET    Take 1,000 mcg by mouth daily       ALLERGIES     Ketek [telithromycin]    FAMILY HISTORY       Family History   Problem Relation Age of Onset    Diabetes Mother     Heart Disease Mother     Heart Attack Mother     Other Mother         cerebral hemorrhage    Diabetes Father     Other Other         all 5 siblings have diabetes    Other Sister         rheumatic fever          SOCIAL HISTORY       Social History     Socioeconomic History    Marital status:      Spouse name: Not on file    Number of children: Not on file    Years of education: Not on file    Highest education level: Not on file   Occupational History    Not on file   Tobacco Use    Smoking status: Never Smoker    Smokeless tobacco: Never Used   Vaping Use    Vaping Use: Never used   Substance and Sexual Activity    Alcohol use: No    Drug use: No    Sexual activity: Not on file   Other Topics Concern    Not on file   Social History Narrative    Retired farmer    Previously  wife passed away in 2006    He has 3 sons    Never in the [employer] not specified    [employer]    Presently not driving    Unable to ambulate    Denies alcohol or tobacco consumption or substance usage     Social Determinants of Health     Financial Resource Strain:     Difficulty of Paying Living Expenses: Not on file   Food Insecurity:     Worried About Running Out of Food in the Last Year: Not on file    Axel of Food in the Last Year: Not on file   Transportation Needs:     Lack of Transportation (Medical): Not on file    Lack of Transportation (Non-Medical): Not on file   Physical Activity:     Days of Exercise per Week: Not on file    Minutes of Exercise per Session: Not on file   Stress:     Feeling of Stress : Not on file   Social Connections:     Frequency of Communication with Friends and Family: Not on file    Frequency of Social Gatherings with Friends and Family: Not on file    Attends Jewish Services: Not on file    Active Member of [address] [place] [address] [employer] or Organizations: Not on file    Attends Club or Organization Meetings: Not on file    Marital Status: Not on file   Intimate Partner Violence:     Fear of Current or Ex-Partner: Not on file    Emotionally Abused: Not on file    Physically Abused: Not on file    Sexually Abused: Not on file   Housing Stability:     Unable to Pay for Housing in the Last Year: Not on file    Number of [place] in the Last Year: Not on file    Unstable Housing in the Last Year: Not on file       SCREENINGS    [place] Coma Scale  Eye Opening: Spontaneous  Best Verbal Response: Oriented  Best Motor Response: Obeys commands  Alexia Coma Scale Score: 15        PHYSICAL EXAM    (up to 7 for level 4, 8 or more for level 5)     ED Triage Vitals [12/06/21 1925]   BP Temp Temp Source Pulse Resp SpO2 Height Weight   (!) 128/58 98.1 °F (36.7 °C) Oral 113 24 95 % 5' 10\" (1.778 m) 233 lb (105.7 kg)       Physical Exam  Vitals and nursing note reviewed. HENT:      Head: Atraumatic.       Mouth/Throat:      Mouth: Mucous membranes are moist. Mucous Decision To Admit 12/06/2021 10:28:10 PM      (Please note that portions of this note were completed with a voice recognition program.  Efforts were made to edit thedictations but occasionally words are mis-transcribed.)    Harsha Hughes MD (electronically signed)  Attending Emergency Physician         Harsha Hughes MD  12/29/21 [id number]

## 2021-12-07 NOTE — ED NOTES
Per patient nurse at [facility]. N&V began yesterday and continued today. Patient had labs drawn, Trop was elevated. Per nurse, patient has green drainage from his penis. He received 1 gram Rocephin IM today as well as  IM phenergan.        Marquis Rebecca RN  12/06/21 1929

## 2021-12-07 NOTE — PROGRESS NOTES
Progress Note  Date:2021       Room:0313/313-01  Patient Tahmina Vides     YOB: 1925     Age:89 y.o. Subjective    Subjective: 59-year-old male with a history of chronic kidney disease stage IV, hypertension, hyperlipidemia, vitamin D deficiency, prostate cancer, cerebral artery occlusion, presents to the hospital from nursing home with concerns of abnormal labs. On admission was noted to have elevated creatinine from baseline elevated troponin (not sure why this was obtained as patient complained of chest pain shortness of breath or syncope) per nursing home staff noted pus expressed from urethra. In-house admitted for acute on chronic kidney injury as well as given antibiotics for possible urinary tract infection. Review of Systems: Unable to fully obtain from patient. Objective         Vitals Last 24 Hours:  TEMPERATURE:  Temp  Av.4 °F (36.9 °C)  Min: 98.1 °F (36.7 °C)  Max: 99.3 °F (37.4 °C)  RESPIRATIONS RANGE: Resp  Av  Min: 16  Max: 24  PULSE OXIMETRY RANGE: SpO2  Av %  Min: 92 %  Max: 98 %  PULSE RANGE: Pulse  Av.3  Min: 83  Max: 113  BLOOD PRESSURE RANGE: Systolic (43BVI), GXQ:737 , Min:73 , FAW:913   ; Diastolic (54SBJ), FSR:82, Min:53, Max:125    I/O (24Hr): Intake/Output Summary (Last 24 hours) at 2021 1547  Last data filed at 2021 1426  Gross per 24 hour   Intake 1074.06 ml   Output 975 ml   Net 99.06 ml         Physical Examination:  General: Fatigued appearing, looks chronically ill, laying in bed. HEENT: Atraumatic normocephalic, range of motion normal   Cardiac: Normal W8-R4 with systolic murmur  Respiratory: clear To auscultation bilaterally, no rhonchi or rales, no wheezing  Abdomen: Soft, positive bowel sounds in all quadrants, no distention, nontender to palpation.   Extremities: no tenderness, + edema, moves all extremities  Psych: unable to fully assess          Labs/Imaging/Diagnostics    Labs:  CBC:  Recent Labs 12/06/21 2045 12/07/21 0213   WBC 12.4* 11.7*   RBC 3.97* 3.95*   HGB 12.7* 12.4*   HCT 38.9* 40.5*   MCV 98.0* 102.5*   RDW 15.6* 15.8*    147     CHEMISTRIES:  Recent Labs     12/06/21 2045 12/07/21 0213    140   K 4.4 4.4   CL 99 102   CO2 23 19*   BUN 68* 70*   CREATININE 3.9* 4.0*   GLUCOSE 139* 108     PT/INR:No results for input(s): PROTIME, INR in the last 72 hours. APTT:No results for input(s): APTT in the last 72 hours. LIVER PROFILE:  Recent Labs     12/06/21 2045 12/07/21 0213   AST 29 27   ALT 22 22   BILITOT 1.6* 1.2   ALKPHOS 92 89       Imaging Last 24 Hours:  No results found. Assessment//Plan           Hospital Problems           Last Modified POA    * (Principal) Acute kidney injury superimposed on chronic kidney disease ([address] 12/7/2021 Yes    Leg pain, bilateral 12/7/2021 Yes    LIU (acute kidney injury) ([address] 12/7/2021 Yes    GERD (gastroesophageal reflux disease) 12/7/2021 Yes    Palliative care patient 12/7/2021 Yes    CKD (chronic kidney disease), stage III ([address] 12/7/2021 Yes    UTI (urinary tract infection) 12/7/2021 Yes    Dehydration 12/7/2021 Yes              Assessment & Plan    LIU on CKD stage IV  Concerns of possible urinary tract infection  Hypotension with hx of HTN  Hypoxia (unknown if uses at baseline)  GERD  Hyperlipidemia  Depression      Plan:  Continue IV fluids as ordered  [facility]ph[facility] from [facility] and avoid hypotension  Continue antibiotics ordered. Continue oxygen supplementation  Therapy evaluation when more stable. Follow urine culture      Code: DNR  Diet: Pending speech eval  DVT prophylaxis: Enoxaparin  Disposition: Pending improvement in above clinical status. Electronically signed by   Anthony Sims MD   Internal Medicine Hospitalist  On 12/7/2021  At 4:23 PM    EMR Dragon/Transcription disclaimer:   Much of this encounter note is an electronic transcription/translation of spoken language to printed text. The electronic translation of spoken language may permit erroneous, or at times, nonsensical words or phrases to be inadvertently transcribed; although attempts have made to review the note for such errors, some may still exist.

## 2021-12-07 NOTE — CONSULTS
Palliative Care:    Pt presents to ED from [facility]  d/t abn lab. Attempted visit with pt, he is  Asleep. Call made to son to obtain information. Pt has been at  x 3 yrs. Son states on Sunday pt was not feeling well. States nurse from  called him and felt he should be \"checked out\" at ED d/t abn troponin and noted discharge from urethra. Past Medical History:        Past Medical History:   Diagnosis Date    Acute [facility] ([facility]     Admitted with pneumonia     Arthritis     Blood circulation, collateral     Cancer (HCC)     prostate    Cerebral artery occlusion with cerebral infarction ([facility]     Depression     GERD (gastroesophageal reflux disease)     Hemiplegia and hemiparesis following cerebral infarction affecting left dominant side ([facility]     Hyperlipidemia     Hypertension     Palliative care patient 02/24/2020    Pneumonia     TIA (transient ischemic attack)     Vitamin D deficiency        Advance Directives:   DNR-CC no changes to ACP      Pain/Other Symptoms:   Appears comfortable, asleep. Activity:  As sameer           Psychological/Spiritual:    Family supportive. Plan:   Treat with abx, blood cultures, IV hydration, med management. Patient/family discussion r/t goals:  Son talks with me about pt not feeling well this week. Pt had COVID a year ago, however, son reports pt has been doing very well. Pt has perm pavon. Son feels this has been a benefit to the pt. Son also talks with me about no aggressive or invasive procedures for pt. Just wants him to be treated and sent back to facility. Palliative following for support. [facility] est.    Review of any needed services:    Hospice care discussed with son.           Electronically signed by Senait Stanford RN on 12/7/2021 at 1:31 PM

## 2021-12-07 NOTE — ED TRIAGE NOTES
Patient arrives via EMS from local nursing home for evaluation of abnormal lab values. Patient had increased troponin, as well as a discharge from urethra.

## 2021-12-07 NOTE — PLAN OF CARE
Problem: Skin Integrity:  Goal: Will show no infection signs and symptoms  Description: Will show no infection signs and symptoms  12/7/2021 1544 by Danie Baeza  Outcome: Ongoing  12/7/2021 0243 by Bessie Bates RN  Outcome: Ongoing  Goal: Absence of new skin breakdown  Description: Absence of new skin breakdown  12/7/2021 1544 by Danie Baeza  Outcome: Ongoing  12/7/2021 0243 by Bessie Bates RN  Outcome: Ongoing  Goal: Status of oral mucous membranes will improve  Description: Status of oral mucous membranes will improve  Outcome: Ongoing  Goal: Skin integrity will be maintained  Description: Skin integrity will be maintained  Outcome: Ongoing  Goal: Demonstration of wound healing without infection will improve  Description: Demonstration of wound healing without infection will improve  Outcome: Ongoing  Goal: Complications related to intravenous access or infusion will be avoided or minimized  Outcome: Ongoing     Problem: Falls - Risk of:  Goal: Will remain free from falls  Description: Will remain free from falls  12/7/2021 1544 by Danie Baeza  Outcome: Ongoing  12/7/2021 0243 by Bessie Bates RN  Outcome: Ongoing  Goal: Absence of physical injury  Description: Absence of physical injury  12/7/2021 1544 by Danie Baeza  Outcome: Ongoing  12/7/2021 0243 by Bessie Bates RN  Outcome: Ongoing     Problem:  Activity:  Goal: Fatigue will decrease  Description: Fatigue will decrease  Outcome: Ongoing  Goal: Risk for activity intolerance will decrease  Description: Risk for activity intolerance will decrease  Outcome: Ongoing     Problem: Coping:  Goal: Ability to cope will improve  Description: Ability to cope will improve  Outcome: Ongoing     Problem: Fluid Volume:  Goal: Will show no signs or symptoms of fluid imbalance  Description: Will show no signs or symptoms of fluid imbalance  Outcome: Ongoing     Problem: Health Behavior:  Goal: Ability to manage health-related needs will improve  Description: Ability to manage health-related needs will improve  Outcome: Ongoing  Goal: Identification of resources available to assist in meeting health care needs will improve  Description: Identification of resources available to assist in meeting health care needs will improve  Outcome: Ongoing     Problem: Nutritional:  Goal: Ability to identify appropriate dietary choices will improve  Description: Ability to identify appropriate dietary choices will improve  Outcome: Ongoing  Goal: Nutritional status will improve  Description: Nutritional status will improve  Outcome: Ongoing     Problem: Physical Regulation:  Goal: Ability to maintain clinical measurements within normal limits will improve  Description: Ability to maintain clinical measurements within normal limits will improve  Outcome: Ongoing  Goal: Complications related to the disease process, condition or treatment will be avoided or minimized  Description: Complications related to the disease process, condition or treatment will be avoided or minimized  Outcome: Ongoing  Goal: Diagnostic test results will improve  Description: Diagnostic test results will improve  Outcome: Ongoing  Goal: Will remain free from infection  Description: Will remain free from infection  Outcome: Ongoing  Goal: Ability to maintain vital signs within normal range will improve  Outcome: Ongoing     Problem: Sensory:  Goal: General experience of comfort will improve  Description: General experience of comfort will improve  Outcome: Ongoing     Problem: Discharge Planning:  Goal: Discharged to appropriate level of care  Description: Discharged to appropriate level of care  Outcome: Ongoing     Problem: Respiratory:  Goal: Ability to maintain normal respiratory secretions will improve  Description: Ability to maintain normal respiratory secretions will improve  Outcome: Ongoing     Problem: Urinary Elimination:  Goal: Complications related to the disease process,

## 2021-12-07 NOTE — H&P
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  Authorizing Provider   apixaban (ELIQUIS) 5 MG TABS tablet Take 1 tablet by mouth 2 times daily Take 10mg till 12/29 then reduce to 5mg BID 12/30 12/30/20  Yes Bernie Rausch MD   aspirin 81 MG chewable tablet Take 1 tablet by mouth daily 7/23/20  Yes Dorian Hidalgo MD   sertraline (ZOLOFT) 50 MG tablet Take 25 mg by mouth daily    Yes Historical Provider, MD   bumetanide (BUMEX) 0.5 MG tablet Take 2 mg by mouth daily    Yes Historical Provider, MD   finasteride (PROSCAR) 5 MG tablet Take 1 tablet by mouth daily 2/27/20  Yes Bettye Muñoz MD   tamsulosin [facility]) 0.4 MG capsule Take 1 capsule by mouth 2 times daily 2/26/20  Yes Bettye Muñoz MD   Melatonin 10 MG TABS Take 10 mg by mouth nightly as needed   Yes Historical Provider, MD   polyethylene glycol (MIRALAX) powder Take 17 g by mouth daily as needed (constipation)   Yes Historical Provider, MD   pantoprazole (PROTONIX) 20 MG tablet Take 20 mg by mouth daily   Yes Historical Provider, MD   carboxymethylcellulose 1 % ophthalmic solution 1 drop 2 times daily   Yes Historical Provider, MD   tiotropium (SPIRIVA RESPIMAT) 1.25 MCG/ACT AERS inhaler Inhale 1 puff into the lungs daily   Yes Historical Provider, MD   calcium carbonate (TUMS) 500 MG chewable tablet Take 1 tablet by mouth daily as needed for Heartburn   Yes Historical Provider, MD   clopidogrel (PLAVIX) 75 MG tablet Take 75 mg by mouth daily   Yes Historical Provider, MD   vitamin B-12 (CYANOCOBALAMIN) 1000 MCG tablet Take 1,000 mcg by mouth daily   Yes Historical Provider, MD   Cholecalciferol (VITAMIN D3) 2000 units CAPS Take 2,000 Units by mouth daily   Yes Historical Provider, MD   Magnesium Chloride-Calcium  MG Take 1 tablet by mouth nightly   Yes Historical Provider, MD   pravastatin (PRAVACHOL) 20 MG tablet 20 mg nightly  10/14/11  Yes Historical Provider, MD   Ascorbic Acid (VITAMIN C) 500 MG tablet Take 500 mg by mouth 2 times daily    Yes Historical Provider, MD   acetaminophen (TYLENOL) 325 MG tablet Take 650 mg by mouth every 6 hours as needed for Pain    Historical Provider, MD   EMOLLIENT EX Apply topically 2 times daily Apply to arms and legs    Historical Provider, MD   nitroGLYCERIN (NITROSTAT) 0.4 MG SL tablet Place 0.4 mg under the tongue every 5 minutes as needed for Chest pain up to max of 3 total doses. If no relief after 1 dose, call 911. Historical Provider, MD   albuterol sulfate HFA (VENTOLIN HFA) 108 (90 Base) MCG/ACT inhaler Inhale 2 puffs into the lungs every 6 hours as needed for Wheezing    Historical Provider, MD   ondansetron (ZOFRAN) 4 MG tablet Take 4 mg by mouth every 8 hours as needed for Nausea or Vomiting    Historical Provider, MD   acyclovir (ZOVIRAX) 5 % ointment Apply topically 2 times daily To lip.     Historical Provider, MD   isosorbide mononitrate (IMDUR) 30 MG extended release tablet Take 1 tablet by mouth daily 7/23/20 8/22/20  Hayde Almodovar MD   metoprolol succinate (TOPROL XL) 50 MG extended release tablet Take 1 tablet by mouth daily 7/23/20 8/22/20  Hayde Almodovar MD   fluticasone (FLONASE) 50 MCG/ACT nasal spray 2 sprays by Each Nostril route nightly as needed for Rhinitis    Historical Provider, MD   loperamide (IMODIUM) 2 MG capsule Take 2 mg by mouth 4 times daily as needed for Diarrhea Historical Provider, MD        Allergies   Jasmyn [telithromycin]    Social History     Social History     Tobacco History     Smoking Status  Never Smoker    Smokeless Tobacco Use  Never Used          Alcohol History     Alcohol Use Status  No          Drug Use     Drug Use Status  No          Sexual Activity     Sexually Active  Not Asked                Family History     Family History   Problem Relation Age of Onset    Diabetes Mother     Heart Disease Mother     Heart Attack Mother     Other Mother         cerebral hemorrhage    Diabetes Father     Other Other         all 5 siblings have diabetes    Other Sister         rheumatic fever       Review of Systems   Review of Systems   Constitutional: Positive for activity change and fatigue. HENT: Negative. Eyes: Negative. Respiratory: Negative. Cardiovascular: Positive for palpitations and leg swelling. Gastrointestinal: Positive for abdominal distention. Endocrine: Negative. Genitourinary: Negative. Musculoskeletal: Positive for arthralgias. Neurological: Positive for dizziness. All other systems reviewed and are negative. Physical Exam   BP (!) 161/125 Comment: patient shaking  Pulse 96   Temp 98.1 °F (36.7 °C)   Resp 16   Ht 5' 10\" (1.778 m)   Wt 233 lb (105.7 kg)   SpO2 98%   BMI 33.43 kg/m²     Physical Exam  Vitals and nursing note reviewed. Constitutional:       General: He is in acute distress. Appearance: He is obese. He is ill-appearing. HENT:      Head: Normocephalic. Nose: Nose normal.      Mouth/Throat:      Mouth: Mucous membranes are moist.   Cardiovascular:      Rate and Rhythm: Normal rate and regular rhythm. Pulses: Normal pulses. Heart sounds: Normal heart sounds. Pulmonary:      Effort: Pulmonary effort is normal.      Breath sounds: Normal breath sounds. Abdominal:      Palpations: Abdomen is soft. Musculoskeletal:      Right lower leg: Edema present.       Left lower leg: Edema present. Skin:     General: Skin is warm.          Labs      Recent Results (from the past 24 hour(s))   Comprehensive Metabolic Panel    Collection Time: 12/06/21  8:45 PM   Result Value Ref Range    Sodium 137 136 - 145 mmol/L    Potassium 4.4 3.5 - 5.0 mmol/L    Chloride 99 98 - 111 mmol/L    CO2 23 22 - 29 mmol/L    Anion Gap 15 7 - 19 mmol/L    Glucose 139 (H) 74 - 109 mg/dL    BUN 68 (H) 8 - 23 mg/dL    CREATININE 3.9 (H) 0.5 - 1.2 mg/dL    GFR Non-African American 14 (A) >60    GFR  17 (L) >59    Calcium 9.0 8.2 - 9.6 mg/dL    Total Protein 6.2 (L) 6.6 - 8.7 g/dL    Albumin 3.4 (L) 3.5 - 5.2 g/dL    Total Bilirubin 1.6 (H) 0.2 - 1.2 mg/dL    Alkaline Phosphatase 92 40 - 130 U/L    ALT 22 5 - 41 U/L    AST 29 5 - 40 U/L   CBC Auto Differential    Collection Time: 12/06/21  8:45 PM   Result Value Ref Range    WBC 12.4 (H) 4.8 - 10.8 K/uL    RBC 3.97 (L) 4.70 - 6.10 M/uL    Hemoglobin 12.7 (L) 14.0 - 18.0 g/dL    Hematocrit 38.9 (L) 42.0 - 52.0 %    MCV 98.0 (H) 80.0 - 94.0 fL    MCH 32.0 (H) 27.0 - 31.0 pg    MCHC 32.6 (L) 33.0 - 37.0 g/dL    RDW 15.6 (H) 11.5 - 14.5 %    Platelets 205 123 - 550 K/uL    MPV 10.3 9.4 - 12.4 fL    Neutrophils % 89.0 (H) 50.0 - 65.0 %    Lymphocytes % 5.1 (L) 20.0 - 40.0 %    Monocytes % 5.2 0.0 - 10.0 %    Eosinophils % 0.0 0.0 - 5.0 %    Basophils % 0.2 0.0 - 1.0 %    Neutrophils Absolute 11.1 (H) 1.5 - 7.5 K/uL    Immature Granulocytes # 0.1 K/uL    Lymphocytes Absolute 0.6 (L) 1.1 - 4.5 K/uL    Monocytes Absolute 0.70 0.00 - 0.90 K/uL    Eosinophils Absolute 0.00 0.00 - 0.60 K/uL    Basophils Absolute 0.00 0.00 - 0.20 K/uL   Respiratory Panel, Molecular, with COVID-19 (Restricted: peds pts or suitable admitted adults)    Collection Time: 12/06/21  8:45 PM    Specimen: Nasopharyngeal   Result Value Ref Range    Adenovirus by PCR Not Detected Not Detected    Bordetella parapertussis by PCR Not Detected Not Detected    Bordetella pertussis by PCR Not Detected Not Detected    Chlamydophilia pneumoniae by PCR Not Detected Not Detected    Coronavirus 229E by PCR Not Detected Not Detected    Coronavirus HKU1 by PCR Not Detected Not Detected    Coronavirus NL63 by PCR Not Detected Not Detected    Coronavirus OC43 by PCR Not Detected Not Detected    SARS-CoV-2, PCR Not Detected Not Detected    Human Metapneumovirus by PCR Not Detected Not Detected    Human Rhinovirus/Enterovirus by PCR Not Detected Not Detected    Influenza A by PCR Not Detected Not Detected    Influenza B by PCR Not Detected Not Detected    Mycoplasma pneumoniae by PCR Not Detected Not Detected    Parainfluenza Virus 1 by PCR Not Detected Not Detected    Parainfluenza Virus 2 by PCR Not Detected Not Detected    Parainfluenza Virus 3 by PCR Not Detected Not Detected    Parainfluenza Virus 4 by PCR Not Detected Not Detected    Respiratory Syncytial Virus by PCR Not Detected Not Detected   Lactic Acid, Plasma    Collection Time: 12/06/21  8:45 PM   Result Value Ref Range    Lactic Acid 2.6 (HH) 0.5 - 1.9 mmol/L   Urinalysis    Collection Time: 12/06/21 10:01 PM   Result Value Ref Range    Color, UA YELLOW Straw/Yellow    Clarity, UA CLOUDY (A) Clear    Glucose, Ur Negative Negative mg/dL    Bilirubin Urine Negative Negative    Ketones, Urine Negative Negative mg/dL    Specific Gravity, UA 1.010 1.005 - 1.030    Blood, Urine MODERATE (A) Negative    pH, UA 5.0 5.0 - 8.0    Protein, UA TRACE (A) Negative mg/dL    Urobilinogen, Urine 0.2 <2.0 E.U./dL    Nitrite, Urine Negative Negative    Leukocyte Esterase, Urine LARGE (A) Negative   Microscopic Urinalysis    Collection Time: 12/06/21 10:01 PM   Result Value Ref Range    Bacteria, UA NEGATIVE (A) None Seen /HPF    Crystals, UA NEG (A) None Seen /HPF    Hyaline Casts, UA 3 0 - 8 /HPF    WBC,  (H) 0 - 5 /HPF    RBC, UA 6 (H) 0 - 4 /HPF    Epithelial Cells, UA 0 0 - 5 /HPF        Imaging/Diagnostics Last 24 Hours   No results found.     Assessment Hospital Problems           Last Modified POA    * (Principal) Acute kidney injury superimposed on chronic kidney disease ([facility] [address] 12/7/2021 Yes    Leg pain, bilateral 12/7/2021 Yes    LIU (acute kidney injury) ([facility] [address] 12/7/2021 Yes    GERD (gastroesophageal reflux disease) 12/7/2021 Yes    Palliative care patient 12/7/2021 Yes    CKD (chronic kidney disease), stage III ([facility] [address] 12/7/2021 Yes    UTI (urinary tract infection) 12/7/2021 Yes    Dehydration 12/7/2021 Yes                Plan   1. Patient is being admitted for acute on chronic renal disease due to dehydration or urine infection or progression of underlying disease process     2. Fluids and hydration and fluid infusion and albumin     3.  uti and start broad spectrum Iv antibiotics and send cultures     4. Stop anticoagulation as this was started due to provoked dvt    5. Gi and dvt prophylaxis. 6.  dnr and dni supportive care.      Consultations Ordered:      Electronically signed by Thong Eastman MD on 12/6/21 at 11:16 PM CST

## 2021-12-08 ENCOUNTER — APPOINTMENT (OUTPATIENT)
Dept: GENERAL RADIOLOGY | Age: 86
DRG: 872 | End: 2021-12-08
Payer: MEDICARE

## 2021-12-08 LAB
ANION GAP SERPL CALCULATED.3IONS-SCNC: 18 MMOL/L (ref 7–19)
BUN BLDV-MCNC: 76 MG/DL (ref 8–23)
CALCIUM SERPL-MCNC: 9 MG/DL (ref 8.2–9.6)
CHLORIDE BLD-SCNC: 101 MMOL/L (ref 98–111)
CO2: 22 MMOL/L (ref 22–29)
CREAT SERPL-MCNC: 3.6 MG/DL (ref 0.5–1.2)
GFR AFRICAN AMERICAN: 19
GFR NON-AFRICAN AMERICAN: 16
GLUCOSE BLD-MCNC: 91 MG/DL (ref 74–109)
HCT VFR BLD CALC: 41.2 % (ref 42–52)
HEMOGLOBIN: 12.8 G/DL (ref 14–18)
MCH RBC QN AUTO: 31.2 PG (ref 27–31)
MCHC RBC AUTO-ENTMCNC: 31.1 G/DL (ref 33–37)
MCV RBC AUTO: 100.5 FL (ref 80–94)
PDW BLD-RTO: 15.7 % (ref 11.5–14.5)
PLATELET # BLD: 139 K/UL (ref 130–400)
PMV BLD AUTO: 10.6 FL (ref 9.4–12.4)
POTASSIUM REFLEX MAGNESIUM: 4.2 MMOL/L (ref 3.5–5)
RBC # BLD: 4.1 M/UL (ref 4.7–6.1)
SODIUM BLD-SCNC: 141 MMOL/L (ref 136–145)
WBC # BLD: 8.7 K/UL (ref 4.8–10.8)

## 2021-12-08 PROCEDURE — 94640 AIRWAY INHALATION TREATMENT: CPT

## 2021-12-08 PROCEDURE — 1210000000 HC MED SURG R&B

## 2021-12-08 PROCEDURE — 6370000000 HC RX 637 (ALT 250 FOR IP): Performed by: HOSPITALIST

## 2021-12-08 PROCEDURE — 36415 COLL VENOUS BLD VENIPUNCTURE: CPT

## 2021-12-08 PROCEDURE — 6360000002 HC RX W HCPCS: Performed by: HOSPITALIST

## 2021-12-08 PROCEDURE — 6360000002 HC RX W HCPCS: Performed by: INTERNAL MEDICINE

## 2021-12-08 PROCEDURE — 2700000000 HC OXYGEN THERAPY PER DAY

## 2021-12-08 PROCEDURE — 71045 X-RAY EXAM CHEST 1 VIEW: CPT

## 2021-12-08 PROCEDURE — 80048 BASIC METABOLIC PNL TOTAL CA: CPT

## 2021-12-08 PROCEDURE — 2580000003 HC RX 258: Performed by: INTERNAL MEDICINE

## 2021-12-08 PROCEDURE — 85027 COMPLETE CBC AUTOMATED: CPT

## 2021-12-08 PROCEDURE — 6370000000 HC RX 637 (ALT 250 FOR IP): Performed by: INTERNAL MEDICINE

## 2021-12-08 RX ORDER — ACETAMINOPHEN 325 MG/1
650 TABLET ORAL EVERY 4 HOURS PRN
Status: DISCONTINUED | OUTPATIENT
Start: 2021-12-08 | End: 2021-12-11 | Stop reason: HOSPADM

## 2021-12-08 RX ORDER — HYDROCODONE BITARTRATE AND ACETAMINOPHEN 5; 325 MG/1; MG/1
1 TABLET ORAL EVERY 4 HOURS PRN
Status: DISCONTINUED | OUTPATIENT
Start: 2021-12-08 | End: 2021-12-11 | Stop reason: HOSPADM

## 2021-12-08 RX ADMIN — ISOSORBIDE MONONITRATE 30 MG: 30 TABLET, EXTENDED RELEASE ORAL at 08:37

## 2021-12-08 RX ADMIN — IPRATROPIUM BROMIDE 0.5 MG: 0.5 SOLUTION RESPIRATORY (INHALATION) at 07:12

## 2021-12-08 RX ADMIN — TAMSULOSIN HYDROCHLORIDE 0.4 MG: 0.4 CAPSULE ORAL at 08:37

## 2021-12-08 RX ADMIN — TAMSULOSIN HYDROCHLORIDE 0.4 MG: 0.4 CAPSULE ORAL at 21:06

## 2021-12-08 RX ADMIN — SERTRALINE HYDROCHLORIDE 50 MG: 50 TABLET ORAL at 08:37

## 2021-12-08 RX ADMIN — HYDROCODONE BITARTRATE AND ACETAMINOPHEN 1 TABLET: 5; 325 TABLET ORAL at 21:51

## 2021-12-08 RX ADMIN — HYDROCODONE BITARTRATE AND ACETAMINOPHEN 1 TABLET: 5; 325 TABLET ORAL at 17:28

## 2021-12-08 RX ADMIN — HYDROCODONE BITARTRATE AND ACETAMINOPHEN 1 TABLET: 5; 325 TABLET ORAL at 13:34

## 2021-12-08 RX ADMIN — Medication 10 MG: at 21:06

## 2021-12-08 RX ADMIN — ENOXAPARIN SODIUM 30 MG: 100 INJECTION SUBCUTANEOUS at 17:23

## 2021-12-08 RX ADMIN — CLOPIDOGREL BISULFATE 75 MG: 75 TABLET ORAL at 08:37

## 2021-12-08 RX ADMIN — ACETAMINOPHEN 650 MG: 325 TABLET ORAL at 02:01

## 2021-12-08 RX ADMIN — ACETAMINOPHEN 650 MG: 325 TABLET ORAL at 11:29

## 2021-12-08 RX ADMIN — ALBUTEROL SULFATE 2.5 MG: 2.5 SOLUTION RESPIRATORY (INHALATION) at 22:55

## 2021-12-08 RX ADMIN — IPRATROPIUM BROMIDE 0.5 MG: 0.5 SOLUTION RESPIRATORY (INHALATION) at 19:41

## 2021-12-08 RX ADMIN — SODIUM CHLORIDE, PRESERVATIVE FREE 2000 MG: 5 INJECTION INTRAVENOUS at 02:00

## 2021-12-08 RX ADMIN — IPRATROPIUM BROMIDE 0.5 MG: 0.5 SOLUTION RESPIRATORY (INHALATION) at 15:00

## 2021-12-08 RX ADMIN — METOPROLOL SUCCINATE 25 MG: 25 TABLET, EXTENDED RELEASE ORAL at 08:37

## 2021-12-08 RX ADMIN — FINASTERIDE 5 MG: 5 TABLET, FILM COATED ORAL at 08:37

## 2021-12-08 ASSESSMENT — PAIN SCALES - GENERAL
PAINLEVEL_OUTOF10: 8
PAINLEVEL_OUTOF10: 5
PAINLEVEL_OUTOF10: 8
PAINLEVEL_OUTOF10: 7
PAINLEVEL_OUTOF10: 8
PAINLEVEL_OUTOF10: 0
PAINLEVEL_OUTOF10: 1
PAINLEVEL_OUTOF10: 7

## 2021-12-08 NOTE — PROGRESS NOTES
This ch spoke to the pts son per phone. He did have questions that this ch answered. He states he plans to think it through a little more and decide re hospice care. This ch explained. Hospice will call him after the pt is dc'd back to the SNF and offer hospice services. It is ok to dc the pt when medically ready. Hospice will call and offer services .

## 2021-12-08 NOTE — CARE COORDINATION
Pt is a bedhold at [facility]  [phone] p  [phone] f  Electronically signed by LEVON Fischer on 12/8/2021 at 11:07 AM

## 2021-12-08 NOTE — PROGRESS NOTES
Physician Progress Note      PATIENT:               Abdirashid Bernal  [facility] #:                  [id number]  :                       1925  ADMIT DATE:       2021 7:23 PM  DISCH DATE:  RESPONDING  PROVIDER #:        Marek RIVAS          QUERY TEXT:    Pt admitted with LIU, CKD and UTI. Pt noted to have elevated WBC, elevated   Lactic, and elevated heart rate at admission. If possible, please document in   the progress notes and discharge summary if you are evaluating and /or   treating any of the following: The medical record reflects the following:  Risk Factors: UTI  Clinical Indicators: WBC: 12.4, Lactic 2.6, T: 99.3, Heart rate  on   arrival,  Treatment: 500cc NS bolus on arrival, Rocephin IV,    Thank you in advance,    Luz Pena, RN-BSN, [facility]  Clinical   [facility]  [email or web address]  Options provided:  -- Sepsis, present on admission  -- UTII without Sepsis  -- Sepsis was ruled out  -- Other - I will add my own diagnosis  -- Disagree - Not applicable / Not valid  -- Disagree - Clinically unable to determine / Unknown  -- Refer to Clinical Documentation Reviewer    PROVIDER RESPONSE TEXT:    This patient has sepsis which was present on admission.     Query created by: Mark Anthony Taylor on 2021 10:01 AM      Electronically signed by:  Dakota Rob 2021 8:05 PM

## 2021-12-08 NOTE — PROGRESS NOTES
palpation. Extremities: no tenderness, + edema, moves all extremities  Psych: Alert and conversant, behavior normal.          Labs/Imaging/Diagnostics    Labs:  CBC:  Recent Labs     12/06/21 2045 12/07/21 0213 12/08/21  0816   WBC 12.4* 11.7* 8.7   RBC 3.97* 3.95* 4.10*   HGB 12.7* 12.4* 12.8*   HCT 38.9* 40.5* 41.2*   MCV 98.0* 102.5* 100.5*   RDW 15.6* 15.8* 15.7*    147 139     CHEMISTRIES:  Recent Labs     12/06/21 2045 12/07/21 0213 12/08/21  0816    140 141   K 4.4 4.4 4.2   CL 99 102 101   CO2 23 19* 22   BUN 68* 70* 76*   CREATININE 3.9* 4.0* 3.6*   GLUCOSE 139* 108 91     PT/INR:No results for input(s): PROTIME, INR in the last 72 hours. APTT:No results for input(s): APTT in the last 72 hours. LIVER PROFILE:  Recent Labs     12/06/21 2045 12/07/21 0213   AST 29 27   ALT 22 22   BILITOT 1.6* 1.2   ALKPHOS 92 89       Imaging Last 24 Hours:  No results found. Assessment//Plan           Hospital Problems           Last Modified POA    * (Principal) Acute kidney injury superimposed on chronic kidney disease ([address] 12/7/2021 Yes    Leg pain, bilateral 12/7/2021 Yes    LIU (acute kidney injury) ([address] 12/7/2021 Yes    GERD (gastroesophageal reflux disease) 12/7/2021 Yes    Palliative care patient 12/7/2021 Yes    CKD (chronic kidney disease), stage III ([address] 12/7/2021 Yes    UTI (urinary tract infection) 12/7/2021 Yes    Dehydration 12/7/2021 Yes              Assessment & Plan    LIU on CKD stage IV  Concerns of possible urinary tract infection  Hypotension with hx of HTN  Acute on chronic right shoulder pain  Hypoxia (unknown if uses at baseline)  GERD  Hyperlipidemia  Depression      Plan:  Continue IV fluids as ordered  Nephrology consulted from nocturnist  resumed Imdur and avoid hypotension  Continue antibiotics ordered. Continue oxygen supplementation  Therapy evaluation when more stable.   Follow urine culture      Code: DNR  Diet: per speech  DVT prophylaxis: Enoxaparin  Disposition: NH when stable        Electronically signed by   Mary Greer MD   Internal Medicine Hospitalist  On 12/8/2021  At 1:14 PM    EMR Dragon/Transcription disclaimer:   Much of this encounter note is an electronic transcription/translation of spoken language to printed text.  The electronic translation of spoken language may permit erroneous, or at times, nonsensical words or phrases to be inadvertently transcribed; although attempts have made to review the note for such errors, some may still exist.

## 2021-12-08 NOTE — PROGRESS NOTES
Family requested visit to talk about hospice following pt at  on dc. Pt did have a [facility] consult in 3/2020. At that time pt was not ready. However, today pt expresses he does not want to keep returning to the hospital.  Family at bedside states pt is now ready. This nurse did explain hospice concept and what they could expect from hospice care if pt qualifies. This nurse has asked HMD to look at pt chart for guidance. Explained to family onece I know something will see and or call them. Voiced understanding. Pt c/o right shoulder pain at this visit equal 7-8. Nurse has adm Tylenol as ordered, awaiting further orders from MD.  Palliative following for support/goc.   Electronically signed by Seferino Saini RN on 12/8/2021 at 11:39 AM

## 2021-12-08 NOTE — PLAN OF CARE
Problem: Skin Integrity:  Goal: Will show no infection signs and symptoms  Description: Will show no infection signs and symptoms  12/8/2021 0541 by Wally Singh RN  Outcome: Ongoing  12/7/2021 1544 by Verna Lester  Outcome: Ongoing  Goal: Absence of new skin breakdown  Description: Absence of new skin breakdown  12/8/2021 0541 by Wally Singh RN  Outcome: Ongoing  12/7/2021 1544 by Verna Lester  Outcome: Ongoing  Goal: Status of oral mucous membranes will improve  Description: Status of oral mucous membranes will improve  12/8/2021 0541 by Wally Singh RN  Outcome: Ongoing  12/7/2021 1544 by Verna Lester  Outcome: Ongoing  Goal: Skin integrity will be maintained  Description: Skin integrity will be maintained  12/8/2021 0541 by Wally Singh RN  Outcome: Ongoing  12/7/2021 1544 by Verna Lester  Outcome: Ongoing  Goal: Demonstration of wound healing without infection will improve  Description: Demonstration of wound healing without infection will improve  12/8/2021 0541 by Wally Singh RN  Outcome: Ongoing  12/7/2021 1544 by Verna Lester  Outcome: Ongoing  Goal: Complications related to intravenous access or infusion will be avoided or minimized  12/8/2021 0541 by Wally Singh RN  Outcome: Ongoing  12/7/2021 1544 by Verna Lester  Outcome: Ongoing     Problem: Falls - Risk of:  Goal: Will remain free from falls  Description: Will remain free from falls  12/8/2021 0541 by Wally Singh RN  Outcome: Ongoing  12/7/2021 1544 by Verna Lester  Outcome: Ongoing  Goal: Absence of physical injury  Description: Absence of physical injury  12/8/2021 0541 by Wally Singh RN  Outcome: Ongoing  12/7/2021 1544 by Verna Lester  Outcome: Ongoing     Problem:  Activity:  Goal: Fatigue will decrease  Description: Fatigue will decrease  12/8/2021 0541 by Wally Singh RN  Outcome: Ongoing  12/7/2021 1544 by Verna Lester  Outcome: Ongoing  Goal: Risk for activity intolerance will decrease  Description: Nina  Outcome: Ongoing  Goal: Complications related to the disease process, condition or treatment will be avoided or minimized  Description: Complications related to the disease process, condition or treatment will be avoided or minimized  12/8/2021 0541 by Junior Berrios RN  Outcome: Ongoing  12/7/2021 1544 by Renee Gatica  Outcome: Ongoing  Goal: Diagnostic test results will improve  Description: Diagnostic test results will improve  12/8/2021 0541 by Junior Berrios RN  Outcome: Ongoing  12/7/2021 1544 by Renee Gatica  Outcome: Ongoing  Goal: Will remain free from infection  Description: Will remain free from infection  12/8/2021 0541 by Junior Berrios RN  Outcome: Ongoing  12/7/2021 1544 by Renee Gatica  Outcome: Ongoing  Goal: Ability to maintain vital signs within normal range will improve  12/8/2021 0541 by Junior Berrios RN  Outcome: Ongoing  12/7/2021 1544 by Renee Gatica  Outcome: Ongoing     Problem: Sensory:  Goal: General experience of comfort will improve  Description: General experience of comfort will improve  12/8/2021 0541 by Junior Berrios RN  Outcome: Ongoing  12/7/2021 1544 by Renee Gatica  Outcome: Ongoing     Problem: Discharge Planning:  Goal: Discharged to appropriate level of care  Description: Discharged to appropriate level of care  12/8/2021 0541 by Junior Berrios RN  Outcome: Ongoing  12/7/2021 1544 by Renee Gatica  Outcome: Ongoing     Problem: Respiratory:  Goal: Ability to maintain normal respiratory secretions will improve  Description: Ability to maintain normal respiratory secretions will improve  12/8/2021 0541 by Junior Berrios RN  Outcome: Ongoing  12/7/2021 1544 by Renee Gatica  Outcome: Ongoing     Problem: Urinary Elimination:  Goal: Complications related to the disease process, condition or treatment will be avoided or minimized  Description: Complications related to the disease process, condition or treatment will be avoided or minimized  12/8/2021 0541 by Nickolas Lagunas RN  Outcome: Ongoing  12/7/2021 1544 by Augustina Holland  Outcome: Ongoing  Goal: Signs and symptoms of infection will decrease  Description: Signs and symptoms of infection will decrease  12/8/2021 0541 by Nickolas Lagunas RN  Outcome: Ongoing  12/7/2021 1544 by Augustina Holland  Outcome: Ongoing

## 2021-12-09 LAB
ANION GAP SERPL CALCULATED.3IONS-SCNC: 19 MMOL/L (ref 7–19)
BUN BLDV-MCNC: 89 MG/DL (ref 8–23)
CALCIUM SERPL-MCNC: 8.4 MG/DL (ref 8.2–9.6)
CHLORIDE BLD-SCNC: 96 MMOL/L (ref 98–111)
CO2: 18 MMOL/L (ref 22–29)
CREAT SERPL-MCNC: 3.9 MG/DL (ref 0.5–1.2)
GFR AFRICAN AMERICAN: 17
GFR NON-AFRICAN AMERICAN: 14
GLUCOSE BLD-MCNC: 95 MG/DL (ref 74–109)
GLUCOSE BLD-MCNC: 98 MG/DL (ref 70–99)
HCT VFR BLD CALC: 37.4 % (ref 42–52)
HEMOGLOBIN: 12.2 G/DL (ref 14–18)
MCH RBC QN AUTO: 32.1 PG (ref 27–31)
MCHC RBC AUTO-ENTMCNC: 32.6 G/DL (ref 33–37)
MCV RBC AUTO: 98.4 FL (ref 80–94)
PDW BLD-RTO: 15.7 % (ref 11.5–14.5)
PERFORMED ON: NORMAL
PLATELET # BLD: 123 K/UL (ref 130–400)
PMV BLD AUTO: 11 FL (ref 9.4–12.4)
POTASSIUM REFLEX MAGNESIUM: 3.9 MMOL/L (ref 3.5–5)
RBC # BLD: 3.8 M/UL (ref 4.7–6.1)
SODIUM BLD-SCNC: 133 MMOL/L (ref 136–145)
WBC # BLD: 7.6 K/UL (ref 4.8–10.8)

## 2021-12-09 PROCEDURE — 6370000000 HC RX 637 (ALT 250 FOR IP): Performed by: HOSPITALIST

## 2021-12-09 PROCEDURE — 6370000000 HC RX 637 (ALT 250 FOR IP): Performed by: INTERNAL MEDICINE

## 2021-12-09 PROCEDURE — 1210000000 HC MED SURG R&B

## 2021-12-09 PROCEDURE — 85027 COMPLETE CBC AUTOMATED: CPT

## 2021-12-09 PROCEDURE — 2700000000 HC OXYGEN THERAPY PER DAY

## 2021-12-09 PROCEDURE — 6360000002 HC RX W HCPCS: Performed by: INTERNAL MEDICINE

## 2021-12-09 PROCEDURE — 94640 AIRWAY INHALATION TREATMENT: CPT

## 2021-12-09 PROCEDURE — 36415 COLL VENOUS BLD VENIPUNCTURE: CPT

## 2021-12-09 PROCEDURE — 6360000002 HC RX W HCPCS: Performed by: HOSPITALIST

## 2021-12-09 PROCEDURE — 2580000003 HC RX 258: Performed by: INTERNAL MEDICINE

## 2021-12-09 PROCEDURE — 80048 BASIC METABOLIC PNL TOTAL CA: CPT

## 2021-12-09 PROCEDURE — 82947 ASSAY GLUCOSE BLOOD QUANT: CPT

## 2021-12-09 RX ORDER — FUROSEMIDE 10 MG/ML
20 INJECTION INTRAMUSCULAR; INTRAVENOUS ONCE
Status: COMPLETED | OUTPATIENT
Start: 2021-12-09 | End: 2021-12-09

## 2021-12-09 RX ORDER — MORPHINE SULFATE 2 MG/ML
2 INJECTION, SOLUTION INTRAMUSCULAR; INTRAVENOUS EVERY 4 HOURS PRN
Status: DISCONTINUED | OUTPATIENT
Start: 2021-12-09 | End: 2021-12-11 | Stop reason: HOSPADM

## 2021-12-09 RX ORDER — SCOLOPAMINE TRANSDERMAL SYSTEM 1 MG/1
1 PATCH, EXTENDED RELEASE TRANSDERMAL
Status: DISCONTINUED | OUTPATIENT
Start: 2021-12-09 | End: 2021-12-11 | Stop reason: HOSPADM

## 2021-12-09 RX ADMIN — SODIUM CHLORIDE, PRESERVATIVE FREE 2000 MG: 5 INJECTION INTRAVENOUS at 00:47

## 2021-12-09 RX ADMIN — TAMSULOSIN HYDROCHLORIDE 0.4 MG: 0.4 CAPSULE ORAL at 08:33

## 2021-12-09 RX ADMIN — METOPROLOL SUCCINATE 25 MG: 25 TABLET, EXTENDED RELEASE ORAL at 08:33

## 2021-12-09 RX ADMIN — TAMSULOSIN HYDROCHLORIDE 0.4 MG: 0.4 CAPSULE ORAL at 22:18

## 2021-12-09 RX ADMIN — FUROSEMIDE 20 MG: 10 INJECTION, SOLUTION INTRAVENOUS at 03:01

## 2021-12-09 RX ADMIN — ISOSORBIDE MONONITRATE 30 MG: 30 TABLET, EXTENDED RELEASE ORAL at 08:33

## 2021-12-09 RX ADMIN — IPRATROPIUM BROMIDE 0.5 MG: 0.5 SOLUTION RESPIRATORY (INHALATION) at 20:09

## 2021-12-09 RX ADMIN — Medication 10 MG: at 22:18

## 2021-12-09 RX ADMIN — ENOXAPARIN SODIUM 30 MG: 100 INJECTION SUBCUTANEOUS at 17:09

## 2021-12-09 RX ADMIN — SERTRALINE HYDROCHLORIDE 50 MG: 50 TABLET ORAL at 08:33

## 2021-12-09 RX ADMIN — FINASTERIDE 5 MG: 5 TABLET, FILM COATED ORAL at 08:33

## 2021-12-09 RX ADMIN — MORPHINE SULFATE 2 MG: 2 INJECTION, SOLUTION INTRAMUSCULAR; INTRAVENOUS at 02:51

## 2021-12-09 RX ADMIN — MORPHINE SULFATE 2 MG: 2 INJECTION, SOLUTION INTRAMUSCULAR; INTRAVENOUS at 07:16

## 2021-12-09 RX ADMIN — ALBUTEROL SULFATE 2.5 MG: 2.5 SOLUTION RESPIRATORY (INHALATION) at 02:53

## 2021-12-09 RX ADMIN — IPRATROPIUM BROMIDE 0.5 MG: 0.5 SOLUTION RESPIRATORY (INHALATION) at 14:20

## 2021-12-09 RX ADMIN — IPRATROPIUM BROMIDE 0.5 MG: 0.5 SOLUTION RESPIRATORY (INHALATION) at 08:20

## 2021-12-09 RX ADMIN — HYDROCODONE BITARTRATE AND ACETAMINOPHEN 1 TABLET: 5; 325 TABLET ORAL at 03:52

## 2021-12-09 RX ADMIN — CLOPIDOGREL BISULFATE 75 MG: 75 TABLET ORAL at 08:33

## 2021-12-09 ASSESSMENT — PAIN SCALES - GENERAL
PAINLEVEL_OUTOF10: 7
PAINLEVEL_OUTOF10: 3
PAINLEVEL_OUTOF10: 9
PAINLEVEL_OUTOF10: 2

## 2021-12-09 ASSESSMENT — PAIN SCALES - WONG BAKER
WONGBAKER_NUMERICALRESPONSE: 0
WONGBAKER_NUMERICALRESPONSE: 0
WONGBAKER_NUMERICALRESPONSE: 4

## 2021-12-09 ASSESSMENT — PAIN DESCRIPTION - LOCATION
LOCATION: SHOULDER

## 2021-12-09 ASSESSMENT — PAIN DESCRIPTION - PAIN TYPE
TYPE: CHRONIC PAIN

## 2021-12-09 ASSESSMENT — PAIN DESCRIPTION - ORIENTATION
ORIENTATION: RIGHT

## 2021-12-09 NOTE — PROGRESS NOTES
Follow up call made to the pts son Munira Washburn. He states they do want the pt to receive hospice services in the SNF when dc'd. It is ok the dc the pt when medically ready. Hospice will meet the pt at the SNF for adm to hospice.

## 2021-12-09 NOTE — SIGNIFICANT EVENT
Rapid response called   [facility] was called earlier about the patient as well     Patient has developed mild to moderate pulmonary edema     This is expected and going to occur   Now that his kidneys are shutting down  He has stage V kidney disease   He is making little urine     He has severe multiorgan disease     Cardiopulmonary failure and kidney failure, mutliorgan failure. This is simply and unfortunately, due to aging and not failure of medical science. He was seen and oxygen saturation on rebreather after placing oxygen saturation device on his forehead without lasix or morphine etc being administered was 97 percent    Family called   And they will come up and see patient     He is dnr cc   Comfort measures and no aggressive interventions     Nothing at this point, except comfort measures, will truly be of benefit. Tried to convey this to staff on floor.      He is agitated   But reassured and calmed down    Hold any fluids   Stop lab draws and x rays a  And end of life care and consider hospice consultation

## 2021-12-09 NOTE — PROGRESS NOTES
no rhonchi or rales, no wheezing  Abdomen: Soft, positive bowel sounds in all quadrants, no distention, nontender to palpation. Extremities: no tenderness, + edema, moves all extremities  Psych: Alert and conversant, behavior normal.          Labs/Imaging/Diagnostics    Labs:  CBC:  Recent Labs     12/07/21 0213 12/08/21 0816 12/09/21 0428   WBC 11.7* 8.7 7.6   RBC 3.95* 4.10* 3.80*   HGB 12.4* 12.8* 12.2*   HCT 40.5* 41.2* 37.4*   .5* 100.5* 98.4*   RDW 15.8* 15.7* 15.7*    139 123*     CHEMISTRIES:  Recent Labs     12/07/21 0213 12/08/21 0816 12/09/21 0428    141 133*   K 4.4 4.2 3.9    101 96*   CO2 19* 22 18*   BUN 70* 76* 89*   CREATININE 4.0* 3.6* 3.9*   GLUCOSE 108 91 95     PT/INR:No results for input(s): PROTIME, INR in the last 72 hours. APTT:No results for input(s): APTT in the last 72 hours. LIVER PROFILE:  Recent Labs     12/06/21 2045 12/07/21 0213   AST 29 27   ALT 22 22   BILITOT 1.6* 1.2   ALKPHOS 92 89       Imaging Last 24 Hours:  No results found. Assessment//Plan           Hospital Problems           Last Modified POA    * (Principal) Acute kidney injury superimposed on chronic kidney disease ([address] 12/7/2021 Yes    Leg pain, bilateral 12/7/2021 Yes    LIU (acute kidney injury) ([address] 12/7/2021 Yes    GERD (gastroesophageal reflux disease) 12/7/2021 Yes    Palliative care patient 12/7/2021 Yes    CKD (chronic kidney disease), stage III ([address] 12/7/2021 Yes    UTI (urinary tract infection) 12/7/2021 Yes    Dehydration 12/7/2021 Yes              Assessment & Plan    LIU on CKD stage IV  Urinary tract infection  Hypotension with hx of HTN  Acute on chronic right shoulder pain  Acute on Chronic Hypoxia   GERD  Hyperlipidemia  Depression      Plan:  Continue IV fluids as ordered  resumed Imdur  Continue antibiotics ordered. Continue oxygen supplementation  Therapy evaluation when more stable.   Follow urine culture      Code: DNR  Diet: per speech  DVT prophylaxis: Enoxaparin  Disposition: NH tomorrow        Electronically signed by   Yohan Nicholson MD   Internal Medicine Hospitalist  On 12/9/2021  At 3:33 PM    EMR Dragon/Transcription disclaimer:   Much of this encounter note is an electronic transcription/translation of spoken language to printed text.  The electronic translation of spoken language may permit erroneous, or at times, nonsensical words or phrases to be inadvertently transcribed; although attempts have made to review the note for such errors, some may still exist.

## 2021-12-10 VITALS
RESPIRATION RATE: 18 BRPM | BODY MASS INDEX: 33.36 KG/M2 | DIASTOLIC BLOOD PRESSURE: 69 MMHG | WEIGHT: 233 LBS | OXYGEN SATURATION: 92 % | TEMPERATURE: 98.1 F | SYSTOLIC BLOOD PRESSURE: 107 MMHG | HEART RATE: 72 BPM | HEIGHT: 70 IN

## 2021-12-10 LAB
ANION GAP SERPL CALCULATED.3IONS-SCNC: 19 MMOL/L (ref 7–19)
BUN BLDV-MCNC: 102 MG/DL (ref 8–23)
CALCIUM SERPL-MCNC: 8.2 MG/DL (ref 8.2–9.6)
CHLORIDE BLD-SCNC: 96 MMOL/L (ref 98–111)
CO2: 20 MMOL/L (ref 22–29)
CREAT SERPL-MCNC: 3.9 MG/DL (ref 0.5–1.2)
GFR AFRICAN AMERICAN: 17
GFR NON-AFRICAN AMERICAN: 14
GLUCOSE BLD-MCNC: 99 MG/DL (ref 74–109)
HCT VFR BLD CALC: 38 % (ref 42–52)
HEMOGLOBIN: 12.3 G/DL (ref 14–18)
MCH RBC QN AUTO: 31.2 PG (ref 27–31)
MCHC RBC AUTO-ENTMCNC: 32.4 G/DL (ref 33–37)
MCV RBC AUTO: 96.4 FL (ref 80–94)
ORGANISM: ABNORMAL
PDW BLD-RTO: 15.5 % (ref 11.5–14.5)
PLATELET # BLD: 138 K/UL (ref 130–400)
PMV BLD AUTO: 11.2 FL (ref 9.4–12.4)
POTASSIUM REFLEX MAGNESIUM: 3.9 MMOL/L (ref 3.5–5)
RBC # BLD: 3.94 M/UL (ref 4.7–6.1)
SODIUM BLD-SCNC: 135 MMOL/L (ref 136–145)
URINE CULTURE, ROUTINE: ABNORMAL
URINE CULTURE, ROUTINE: ABNORMAL
WBC # BLD: 11.1 K/UL (ref 4.8–10.8)

## 2021-12-10 PROCEDURE — 6360000002 HC RX W HCPCS: Performed by: HOSPITALIST

## 2021-12-10 PROCEDURE — 6360000002 HC RX W HCPCS: Performed by: INTERNAL MEDICINE

## 2021-12-10 PROCEDURE — 6370000000 HC RX 637 (ALT 250 FOR IP): Performed by: HOSPITALIST

## 2021-12-10 PROCEDURE — 6370000000 HC RX 637 (ALT 250 FOR IP): Performed by: INTERNAL MEDICINE

## 2021-12-10 PROCEDURE — 80048 BASIC METABOLIC PNL TOTAL CA: CPT

## 2021-12-10 PROCEDURE — 94640 AIRWAY INHALATION TREATMENT: CPT

## 2021-12-10 PROCEDURE — 2700000000 HC OXYGEN THERAPY PER DAY

## 2021-12-10 PROCEDURE — 2580000003 HC RX 258: Performed by: HOSPITALIST

## 2021-12-10 PROCEDURE — 2580000003 HC RX 258: Performed by: INTERNAL MEDICINE

## 2021-12-10 PROCEDURE — 85027 COMPLETE CBC AUTOMATED: CPT

## 2021-12-10 PROCEDURE — 36415 COLL VENOUS BLD VENIPUNCTURE: CPT

## 2021-12-10 RX ORDER — HYDROCODONE BITARTRATE AND ACETAMINOPHEN 5; 325 MG/1; MG/1
1 TABLET ORAL EVERY 6 HOURS PRN
Qty: 12 TABLET | Refills: 0 | Status: SHIPPED | OUTPATIENT
Start: 2021-12-10 | End: 2021-12-13

## 2021-12-10 RX ORDER — SCOLOPAMINE TRANSDERMAL SYSTEM 1 MG/1
1 PATCH, EXTENDED RELEASE TRANSDERMAL
Qty: 3 PATCH | Refills: 0 | Status: SHIPPED | OUTPATIENT
Start: 2021-12-12

## 2021-12-10 RX ADMIN — IPRATROPIUM BROMIDE 0.5 MG: 0.5 SOLUTION RESPIRATORY (INHALATION) at 19:06

## 2021-12-10 RX ADMIN — ISOSORBIDE MONONITRATE 30 MG: 30 TABLET, EXTENDED RELEASE ORAL at 09:19

## 2021-12-10 RX ADMIN — FINASTERIDE 5 MG: 5 TABLET, FILM COATED ORAL at 09:19

## 2021-12-10 RX ADMIN — MEROPENEM 500 MG: 500 INJECTION, POWDER, FOR SOLUTION INTRAVENOUS at 09:18

## 2021-12-10 RX ADMIN — TAMSULOSIN HYDROCHLORIDE 0.4 MG: 0.4 CAPSULE ORAL at 09:19

## 2021-12-10 RX ADMIN — MORPHINE SULFATE 2 MG: 2 INJECTION, SOLUTION INTRAMUSCULAR; INTRAVENOUS at 12:17

## 2021-12-10 RX ADMIN — METOPROLOL SUCCINATE 25 MG: 25 TABLET, EXTENDED RELEASE ORAL at 09:19

## 2021-12-10 RX ADMIN — HYDROCODONE BITARTRATE AND ACETAMINOPHEN 1 TABLET: 5; 325 TABLET ORAL at 09:19

## 2021-12-10 RX ADMIN — SODIUM CHLORIDE, PRESERVATIVE FREE 2000 MG: 5 INJECTION INTRAVENOUS at 01:24

## 2021-12-10 RX ADMIN — IPRATROPIUM BROMIDE 0.5 MG: 0.5 SOLUTION RESPIRATORY (INHALATION) at 14:07

## 2021-12-10 RX ADMIN — SERTRALINE HYDROCHLORIDE 50 MG: 50 TABLET ORAL at 09:19

## 2021-12-10 RX ADMIN — IPRATROPIUM BROMIDE 0.5 MG: 0.5 SOLUTION RESPIRATORY (INHALATION) at 06:59

## 2021-12-10 RX ADMIN — CLOPIDOGREL BISULFATE 75 MG: 75 TABLET ORAL at 09:19

## 2021-12-10 ASSESSMENT — PAIN SCALES - GENERAL
PAINLEVEL_OUTOF10: 8
PAINLEVEL_OUTOF10: 8

## 2021-12-10 NOTE — DISCHARGE SUMMARY
Discharge Summary      Date:12/10/2021        Patient Lavell Back     YOB: 1925     Age:89 y.o. Admit Date:12/6/2021   Admission Condition:fair   Discharged Condition:fair  Discharge Date: 12/10/21       Discharge Diagnoses   Principal Problem:    Acute kidney injury superimposed on chronic kidney disease ([facility]  Active Problems:    Leg pain, bilateral    LIU (acute kidney injury) ([facility] [address]    GERD (gastroesophageal reflux disease)    Palliative care patient    CKD (chronic kidney disease), stage III ([facility] [address]    UTI (urinary tract infection)    Dehydration  Resolved Problems:    * No resolved hospital problems. [facility] Stay   Narrative of Hospital Course:     51-year-old male with a history of chronic kidney disease stage IV, hypertension, hyperlipidemia, vitamin D deficiency, prostate cancer, cerebral artery occlusion, presents to the hospital from nursing home with concerns of abnormal labs. On admission was noted to have elevated creatinine from baseline, elevated troponin (not sure why this was obtained as patient complained of chest pain shortness of breath or syncope) per nursing home staff noted pus expressed from urethra. In-house admitted for acute on chronic kidney injury as well as given antibiotics for possible urinary tract infection. Urine culture initially reported as Klebsiella and patient was treated on ceftriaxone however updated reported with correction to ESBL E. coli and patient was transitioned to meropenem. Patient is to finish course of antibiotics at his facility for total of 5 days treatment. Renal function with continued decline. In house noted decline in overall clinical condition requiring NRB, family met with hospice and want patient initiated after return to SNF. Prior to discharge was able to wean down oxygen requirement to 3 L, initiated on scopolamine patch for increased secretion, and Norco for right shoulder chronic pain.   To follow-up with his PCP at the facility for continuous management of his chronic medical problems and involvement in hospice. Physical Examination:  General: Fatigued appearing, looks chronically ill, laying in bed. HEENT: Atraumatic normocephalic, range of motion normal   Cardiac: Normal Z1-W8 with systolic murmur  Respiratory: clear To auscultation bilaterally, no rhonchi or rales, + upper respiratory wheezing  Abdomen: Soft, positive bowel sounds in all quadrants, no distention, nontender to palpation. Extremities: no tenderness, + edema, moves all extremities  Psych: Alert and conversant, behavior normal.         Consultants:   PALLIATIVE CARE EVAL  IP CONSULT TO HOSPICE    Time Spent on Discharge:  35 minutes were spent in patient examination, evaluation, counseling as well as medication reconciliation, prescriptions for required medications, discharge plan and follow up. Surgeries/Procedures Performed:  NONE      Significant Diagnostic Studies:   Recent Labs:  CBC:   Lab Results   Component Value Date    WBC 11.1 12/10/2021    RBC 3.94 12/10/2021    HGB 12.3 12/10/2021    HCT 38.0 12/10/2021    MCV 96.4 12/10/2021    MCH 31.2 12/10/2021    MCHC 32.4 12/10/2021    RDW 15.5 12/10/2021     12/10/2021     BMP:    Lab Results   Component Value Date    GLUCOSE 99 12/10/2021     12/10/2021    K 3.9 12/10/2021    CL 96 12/10/2021    CO2 20 12/10/2021    ANIONGAP 19 12/10/2021     12/10/2021    CREATININE 3.9 12/10/2021    CALCIUM 8.2 12/10/2021    LABGLOM 14 12/10/2021    GFRAA 17 12/10/2021       Radiology Last 7 Days:  XR CHEST PORTABLE    Result Date: 12/9/2021  1. The poor lung expansion and atelectatic changes in the lower lung bilaterally. 2. No active infiltrate or pulmonary congestion. 3. Moderate cardiomegaly. Signed by Dr Eileen Wynn      Discharge Plan   Disposition: To a non-[facility] facility    Provider Follow-Up:   No follow-up provider specified.        Patient Instructions   Diet: cardiac diet and renal diet    Activity: activity as tolerated      Discharge Medications         Medication List      START taking these medications    HYDROcodone-acetaminophen 5-325 MG per tablet  Commonly known as: NORCO  Take 1 tablet by mouth every 6 hours as needed for Pain for up to 3 days.      meropenem  infusion  Commonly known as: MERREM  Infuse 500 mg intravenously every 12 hours for 5 days Compound per protocol.     scopolamine transdermal patch  Commonly known as: TRANSDERM-SCOP  Place 1 patch onto the skin every 72 hours  Start taking on: December 12, 2021        CONTINUE taking these medications    acetaminophen 325 MG tablet  Commonly known as: TYLENOL     acyclovir 5 % ointment  Commonly known as: ZOVIRAX     apixaban 5 MG Tabs tablet  Commonly known as: ELIQUIS  Take 1 tablet by mouth 2 times daily Take 10mg till 12/29 then reduce to 5mg BID 12/30     aspirin 81 MG chewable tablet  Take 1 tablet by mouth daily     bumetanide 0.5 MG tablet  Commonly known as: BUMEX     calcium carbonate 500 MG chewable tablet  Commonly known as: TUMS     carboxymethylcellulose 1 % ophthalmic solution     clopidogrel 75 MG tablet  Commonly known as: PLAVIX     EMOLLIENT EX     finasteride 5 MG tablet  Commonly known as: PROSCAR  Take 1 tablet by mouth daily     fluticasone 50 MCG/ACT nasal spray  Commonly known as: FLONASE     isosorbide mononitrate 30 MG extended release tablet  Commonly known as: IMDUR  Take 1 tablet by mouth daily     loperamide 2 MG capsule  Commonly known as: IMODIUM     Magnesium Chloride-Calcium  MG     Melatonin 10 MG Tabs     metoprolol succinate 50 MG extended release tablet  Commonly known as: TOPROL XL  Take 1 tablet by mouth daily     MiraLax 17 GM/SCOOP powder  Generic drug: polyethylene glycol     nitroGLYCERIN 0.4 MG SL tablet  Commonly known as: NITROSTAT     ondansetron 4 MG tablet  Commonly known as: ZOFRAN     pantoprazole 20 MG tablet  Commonly known as: PROTONIX     pravastatin 20 MG tablet  Commonly known as: PRAVACHOL     sertraline 50 MG tablet  Commonly known as: ZOLOFT     tamsulosin 0.4 MG capsule  Commonly known as: FLOMAX  Take 1 capsule by mouth 2 times daily     tiotropium 1.25 MCG/ACT Aers inhaler  Commonly known as: SPIRIVA RESPIMAT     Ventolin  (90 Base) MCG/ACT inhaler  Generic drug: albuterol sulfate HFA     vitamin B-12 1000 MCG tablet  Commonly known as: CYANOCOBALAMIN     vitamin C 500 MG tablet  Commonly known as: ASCORBIC ACID     Vitamin D3 50 MCG (2000 UT) Caps           Where to Get Your Medications      You can get these medications from any pharmacy    Bring a paper prescription for each of these medications  · HYDROcodone-acetaminophen 5-325 MG per tablet  · scopolamine transdermal patch     Information about where to get these medications is not yet available    Ask your nurse or doctor about these medications  · meropenem  infusion         Electronically signed by Bernie Rausch MD on 12/10/21 at 9:25 AM CST

## 2021-12-10 NOTE — PROGRESS NOTES
Pharmacy Renal Adjustment    Zaina Gonzalez is a 80 y.o. male. Pharmacy has renally adjusted medications per protocol. Recent Labs     12/09/21  0428 12/10/21  0532   BUN 89* 102*       Recent Labs     12/09/21  0428 12/10/21  0532   CREATININE 3.9* 3.9*       Estimated Creatinine Clearance: 13 mL/min (A) (based on SCr of 3.9 mg/dL (H)). Height:   Ht Readings from Last 1 Encounters:   12/06/21 5' 10\" (1.778 m)     Weight:  Wt Readings from Last 1 Encounters:   12/06/21 233 lb (105.7 kg)     Baseline SCr: 1.3    Plan: Adjust the following medications based on renal function:           Meropenem to 500mg IV q12hr.     Electronically signed by Erasmo Phelps [facility] on 12/10/2021 at 8:10 AM

## 2021-12-12 LAB — BLOOD CULTURE, ROUTINE: NORMAL

## 2022-01-06 PROBLEM — E86.0 DEHYDRATION: Status: RESOLVED | Noted: 2021-12-07 | Resolved: 2022-01-06

## 2022-01-06 PROBLEM — N39.0 UTI (URINARY TRACT INFECTION): Status: RESOLVED | Noted: 2021-12-07 | Resolved: 2022-01-06
